# Patient Record
Sex: FEMALE | Race: WHITE | Employment: FULL TIME | URBAN - METROPOLITAN AREA
[De-identification: names, ages, dates, MRNs, and addresses within clinical notes are randomized per-mention and may not be internally consistent; named-entity substitution may affect disease eponyms.]

---

## 2017-07-27 LAB
HPV COMMENT: NORMAL
HPV TYPE 16: NOT DETECTED
HPV TYPE 18: NOT DETECTED
HPVOH (OTHER TYPES): NOT DETECTED

## 2022-05-03 ENCOUNTER — HOSPITAL ENCOUNTER (OUTPATIENT)
Dept: MAMMOGRAPHY | Age: 52
Discharge: HOME | End: 2022-05-07
Payer: COMMERCIAL

## 2022-05-03 VITALS — BODY MASS INDEX: 37.99 KG/M2 | HEIGHT: 65 IN | WEIGHT: 228 LBS

## 2022-05-03 DIAGNOSIS — Z12.31 VISIT FOR SCREENING MAMMOGRAM: ICD-10-CM

## 2022-05-03 PROCEDURE — 77063 BREAST TOMOSYNTHESIS BI: CPT

## 2023-07-06 ENCOUNTER — TELEPHONE (OUTPATIENT)
Dept: ORTHOPEDIC SURGERY | Age: 53
End: 2023-07-06

## 2023-07-06 NOTE — TELEPHONE ENCOUNTER
Faxed referral received for nondisplaced toe fracture. Left a message asking for a callback if interested in scheduling next week with Jair Monreal or Dr Sangeetha Stoddard.     Can offer 3:45 PM appt on 7/11 in [place]

## 2024-03-14 ENCOUNTER — TELEPHONE (OUTPATIENT)
Dept: PULMONOLOGY | Age: 54
End: 2024-03-14

## 2024-03-18 ENCOUNTER — OFFICE VISIT (OUTPATIENT)
Dept: PULMONOLOGY | Age: 54
End: 2024-03-18
Payer: COMMERCIAL

## 2024-03-18 VITALS
SYSTOLIC BLOOD PRESSURE: 138 MMHG | BODY MASS INDEX: 37.49 KG/M2 | OXYGEN SATURATION: 99 % | HEART RATE: 95 BPM | WEIGHT: 225 LBS | HEIGHT: 65 IN | DIASTOLIC BLOOD PRESSURE: 90 MMHG

## 2024-03-18 DIAGNOSIS — F17.200 CURRENT EVERY DAY SMOKER: ICD-10-CM

## 2024-03-18 DIAGNOSIS — R91.1 NODULE OF LOWER LOBE OF RIGHT LUNG: Primary | ICD-10-CM

## 2024-03-18 DIAGNOSIS — R06.09 DOE (DYSPNEA ON EXERTION): ICD-10-CM

## 2024-03-18 PROCEDURE — 99407 BEHAV CHNG SMOKING > 10 MIN: CPT | Performed by: INTERNAL MEDICINE

## 2024-03-18 PROCEDURE — 99204 OFFICE O/P NEW MOD 45 MIN: CPT | Performed by: INTERNAL MEDICINE

## 2024-03-18 RX ORDER — CETIRIZINE HYDROCHLORIDE 10 MG/1
10 TABLET ORAL DAILY
COMMUNITY
Start: 2024-02-12 | End: 2025-02-11

## 2024-03-18 RX ORDER — FLUTICASONE PROPIONATE 50 MCG
2 SPRAY, SUSPENSION (ML) NASAL DAILY
COMMUNITY
Start: 2022-11-14

## 2024-03-18 RX ORDER — VARENICLINE TARTRATE 1 MG/1
1 TABLET, FILM COATED ORAL 2 TIMES DAILY
Qty: 60 TABLET | Refills: 5 | Status: SHIPPED | OUTPATIENT
Start: 2024-03-18

## 2024-03-18 NOTE — PROGRESS NOTES
PULMONARY OFFICE NEW PATIENT VISIT    CONSULTING PHYSICIAN:      REASON FOR VISIT:   Chief Complaint   Patient presents with    LUNG NODULE     Results    Shortness of Breath    Wheezing       DATE OF VISIT: 3/18/2024    HISTORY OF PRESENT ILLNESS: 53 y.o. year old female smoker who is here for evaluation of lung nodule.    Patient stated that she had CT abdomen performed in December 2023 that showed right lower lobe 1.2 x 1 cm lung nodule.  This was followed by on CT chest in 3 months on 3/11/2024 that showed the right lower lobe nodule has slightly increased in size now measuring 1.5 x 1.2 cm.  I personally reviewed and interpreted the images.    Patient does complain of dyspnea on exertion when she walks long distances.  She denies any chronic cough or wheezing or chest pain or hemoptysis.    She has extensive smoking history of 28 pack years and continues to smoke 1 pack/day.  Patient stated that she tried to use Chantix 1 time to help quit smoking.  She stated quit for 1-1/2 months but then started smoking again.    No family history of lung cancer.  Her mother had breast cancer.  Patient works as a .      REVIEW OF SYSTEMS:   CONSTITUTIONAL SYMPTOMS: The patient denies fever, fatigue, night sweats, weight loss or weight gain.   HEENT: No vision changes. No tinnitus, Denies sinus pain. No hoarseness, or dysphagia.   NECK: Patient denies swelling in the neck.   CARDIOVASCULAR: Denies chest pain, palpitation, syncope.  RESPIRATORY: See above.   GASTROINTESTINAL: Denies nausea, abdominal pain or change in bowel function.  GENITOURINARY: Denies obstructive symptoms. No history of incontinence.  BREASTS: No masses or lumps in the breasts.   SKIN: No rashes or itching.   MUSCULOSKELETAL: Denies weakness or bone pain.   NEUROLOGICAL: No headaches or seizures.   PSYCHIATRIC: Denies mood swings or depression.   ENDOCRINE: Denies heat or cold intolerance or excessive thirst.  HEMATOLOGIC/LYMPHATIC: Denies

## 2024-03-21 ENCOUNTER — HOSPITAL ENCOUNTER (OUTPATIENT)
Dept: PULMONOLOGY | Age: 54
Discharge: HOME | End: 2024-03-21
Attending: INTERNAL MEDICINE
Payer: COMMERCIAL

## 2024-03-21 VITALS — HEART RATE: 93 BPM | RESPIRATION RATE: 16 BRPM | OXYGEN SATURATION: 97 %

## 2024-03-21 DIAGNOSIS — R06.09 DOE (DYSPNEA ON EXERTION): ICD-10-CM

## 2024-03-21 LAB
DLCO %PRED: 55 %
DLCO PRED: NORMAL
DLCO/VA %PRED: NORMAL
DLCO/VA PRED: NORMAL
DLCO/VA: NORMAL
DLCO: NORMAL
EXPIRATORY TIME-POST: NORMAL
EXPIRATORY TIME: NORMAL
FEF 25-75 %CHNG: NORMAL
FEF 25-75 POST %PRED: NORMAL
FEF 25-75% %PRED-PRE: NORMAL
FEF 25-75% PRED: NORMAL
FEF 25-75-POST: NORMAL
FEF 25-75-PRE: NORMAL
FEV1 %PRED-POST: 81 %
FEV1 %PRED-PRE: 74 %
FEV1 PRED: NORMAL
FEV1-POST: NORMAL
FEV1-PRE: NORMAL
FEV1/FVC %PRED-POST: NORMAL
FEV1/FVC %PRED-PRE: NORMAL
FEV1/FVC PRED: NORMAL
FEV1/FVC-POST: 107 %
FEV1/FVC-PRE: 108 %
FVC %PRED-POST: NORMAL
FVC %PRED-PRE: NORMAL
FVC PRED: NORMAL
FVC-POST: NORMAL
FVC-PRE: NORMAL
GAW %PRED: NORMAL
GAW PRED: NORMAL
GAW: NORMAL
IC PRE %PRED: NORMAL
IC PRED: NORMAL
IC: NORMAL
MEP: NORMAL
MIP: NORMAL
MVV %PRED-PRE: NORMAL
MVV PRED: NORMAL
MVV-PRE: NORMAL
PEF %PRED-POST: NORMAL
PEF %PRED-PRE: NORMAL
PEF PRED: NORMAL
PEF%CHNG: NORMAL
PEF-POST: NORMAL
PEF-PRE: NORMAL
RAW %PRED: NORMAL
RAW PRED: NORMAL
RAW: NORMAL
RV PRE %PRED: NORMAL
RV PRED: NORMAL
RV: NORMAL
SVC %PRED: NORMAL
SVC PRED: NORMAL
SVC: NORMAL
TLC PRE %PRED: 86 %
TLC PRED: NORMAL
TLC: NORMAL
VA %PRED: NORMAL
VA PRED: NORMAL
VA: NORMAL
VTG %PRED: NORMAL
VTG PRED: NORMAL
VTG: NORMAL

## 2024-03-21 PROCEDURE — 94060 EVALUATION OF WHEEZING: CPT

## 2024-03-21 PROCEDURE — 94760 N-INVAS EAR/PLS OXIMETRY 1: CPT

## 2024-03-21 PROCEDURE — 94729 DIFFUSING CAPACITY: CPT

## 2024-03-21 PROCEDURE — 94726 PLETHYSMOGRAPHY LUNG VOLUMES: CPT

## 2024-03-21 PROCEDURE — 6370000000 HC RX 637 (ALT 250 FOR IP): Performed by: INTERNAL MEDICINE

## 2024-03-21 RX ORDER — ALBUTEROL SULFATE 90 UG/1
4 AEROSOL, METERED RESPIRATORY (INHALATION) ONCE
Status: COMPLETED | OUTPATIENT
Start: 2024-03-21 | End: 2024-03-21

## 2024-03-21 RX ADMIN — Medication 4 PUFF: at 08:17

## 2024-03-21 ASSESSMENT — PULMONARY FUNCTION TESTS
FEV1_PERCENT_PREDICTED_PRE: 74
FEV1_PERCENT_PREDICTED_POST: 81
FEV1/FVC_PRE: 108
FEV1/FVC_POST: 107

## 2024-03-22 NOTE — PROCEDURES
Pulmonary Function Testing      Patient name:  Cici Hernández      Unit #:   [id number]   Date of test:  3/21/2024   Date of interpretation:   3/22/2024    Ms. Cici Hernández is a 53 y.o. year-old current smoker. The spirometry data were acceptable and reproducible.     Spirometry:  Flow volume loops were normal. The FEV-1/FVC ratio was normal. The pre-bronchodilator FEV-1 was 2.05 liters (74% of predicted), which was moderately decreased. The FVC was 2.4 liters (68% of predicted), which was decreased. Response to inhaled bronchodilators (albuterol) was not significant.    Lung volumes:  Lung volumes were tested by plethysmography. The total lung capacity was 4.4 liters (86% of predicted), which was normal. The residual volume was 1.88 liters (100% of predicted), which was normal. The ratio of residual volume to total lung capacity (RV/TLC) was 43, which was normal.     Diffusion capacity was found to be 55% predicted which is Moderately decreased.      Interpretation:  Isolated decrease in diffusion capacity noted, without restriction. Clinical correlation is recommended.    Comments:   0

## 2024-03-25 ENCOUNTER — HOSPITAL ENCOUNTER (OUTPATIENT)
Dept: PET IMAGING | Age: 54
Discharge: HOME | End: 2024-03-25
Payer: COMMERCIAL

## 2024-03-25 ENCOUNTER — TELEPHONE (OUTPATIENT)
Dept: PULMONOLOGY | Age: 54
End: 2024-03-25

## 2024-03-25 DIAGNOSIS — R91.1 NODULE OF LOWER LOBE OF RIGHT LUNG: ICD-10-CM

## 2024-03-25 PROCEDURE — 3430000000 HC RX DIAGNOSTIC RADIOPHARMACEUTICAL: Performed by: INTERNAL MEDICINE

## 2024-03-25 PROCEDURE — 78815 PET IMAGE W/CT SKULL-THIGH: CPT

## 2024-03-25 PROCEDURE — A9609 HC RX DIAGNOSTIC RADIOPHARMACEUTICAL: HCPCS | Performed by: INTERNAL MEDICINE

## 2024-03-25 RX ORDER — FLUDEOXYGLUCOSE F 18 200 MCI/ML
12.03 INJECTION, SOLUTION INTRAVENOUS
Status: COMPLETED | OUTPATIENT
Start: 2024-03-25 | End: 2024-03-25

## 2024-03-25 RX ADMIN — FLUDEOXYGLUCOSE F 18 12.03 MILLICURIE: 200 INJECTION, SOLUTION INTRAVENOUS at 08:57

## 2024-03-26 NOTE — TELEPHONE ENCOUNTER
Patient called and would like  to call patient tmr to go over results a  little more. Informed patient  left for the day and will be back in office tmr 3/27/24

## 2024-04-05 ENCOUNTER — TELEPHONE (OUTPATIENT)
Dept: PULMONOLOGY | Age: 54
End: 2024-04-05

## 2024-04-05 DIAGNOSIS — R91.1 NODULE OF LOWER LOBE OF RIGHT LUNG: Primary | ICD-10-CM

## 2024-04-05 NOTE — TELEPHONE ENCOUNTER
Called and spoke to patient and she would like to speak to you about results before BX is scheduled.

## 2024-04-08 ENCOUNTER — TELEPHONE (OUTPATIENT)
Dept: PULMONOLOGY | Age: 54
End: 2024-04-08

## 2024-04-08 NOTE — TELEPHONE ENCOUNTER
Please let Dr. Gerardo know that this case was discussed with Dr. Magno Garcia before placing the order.  I have discussed with the patient in details and patient really wants a biopsy to know the etiology of this nodule.

## 2024-04-08 NOTE — TELEPHONE ENCOUNTER
Delma with [facility] called to say that Dr Gerardo is recommending that the pt wait and have a repeat CT in 3 months rather than the Bx right now. Stated you can call Dr Gerardo Radiologist to discuss this at [phone].

## 2024-04-09 ENCOUNTER — TELEPHONE (OUTPATIENT)
Dept: INTERVENTIONAL RADIOLOGY/VASCULAR | Age: 54
End: 2024-04-09

## 2024-04-16 ENCOUNTER — HOSPITAL ENCOUNTER (OUTPATIENT)
Dept: CT IMAGING | Age: 54
Discharge: HOME | End: 2024-04-16
Payer: COMMERCIAL

## 2024-04-16 ENCOUNTER — HOSPITAL ENCOUNTER (OUTPATIENT)
Dept: GENERAL RADIOLOGY | Age: 54
Discharge: HOME | End: 2024-04-16
Payer: COMMERCIAL

## 2024-04-16 VITALS
OXYGEN SATURATION: 98 % | HEART RATE: 58 BPM | WEIGHT: 232 LBS | BODY MASS INDEX: 38.65 KG/M2 | RESPIRATION RATE: 16 BRPM | TEMPERATURE: 97.3 F | HEIGHT: 65 IN | DIASTOLIC BLOOD PRESSURE: 87 MMHG | SYSTOLIC BLOOD PRESSURE: 148 MMHG

## 2024-04-16 DIAGNOSIS — R91.1 NODULE OF LOWER LOBE OF RIGHT LUNG: ICD-10-CM

## 2024-04-16 LAB
ANION GAP SERPL CALCULATED.3IONS-SCNC: 11 MMOL/L (ref 3–16)
APTT BLD: 27.8 SEC (ref 22.1–36.4)
BASOPHILS # BLD: 0.1 K/UL (ref 0–0.2)
BASOPHILS NFR BLD: 1 %
BUN SERPL-MCNC: 13 MG/DL (ref 7–20)
CALCIUM SERPL-MCNC: 9.5 MG/DL (ref 8.3–10.6)
CHLORIDE SERPL-SCNC: 102 MMOL/L (ref 99–110)
CO2 SERPL-SCNC: 25 MMOL/L (ref 21–32)
CREAT SERPL-MCNC: 0.8 MG/DL (ref 0.6–1.1)
DEPRECATED RDW RBC AUTO: 13.9 % (ref 12.4–15.4)
EOSINOPHIL # BLD: 0.4 K/UL (ref 0–0.6)
EOSINOPHIL NFR BLD: 5.6 %
GFR SERPLBLD CREATININE-BSD FMLA CKD-EPI: 87 ML/MIN/{1.73_M2}
GLUCOSE SERPL-MCNC: 97 MG/DL (ref 70–99)
HCT VFR BLD AUTO: 43.4 % (ref 36–48)
HGB BLD-MCNC: 14.6 G/DL (ref 12–16)
INR PPP: 1.08 (ref 0.85–1.15)
LYMPHOCYTES # BLD: 1.4 K/UL (ref 1–5.1)
LYMPHOCYTES NFR BLD: 21 %
MCH RBC QN AUTO: 29.5 PG (ref 26–34)
MCHC RBC AUTO-ENTMCNC: 33.6 G/DL (ref 31–36)
MCV RBC AUTO: 88 FL (ref 80–100)
MONOCYTES # BLD: 0.7 K/UL (ref 0–1.3)
MONOCYTES NFR BLD: 10 %
NEUTROPHILS # BLD: 4.2 K/UL (ref 1.7–7.7)
NEUTROPHILS NFR BLD: 62.4 %
PLATELET # BLD AUTO: 280 K/UL (ref 135–450)
PMV BLD AUTO: 7.9 FL (ref 5–10.5)
POTASSIUM SERPL-SCNC: 4.4 MMOL/L (ref 3.5–5.1)
PROTHROMBIN TIME: 14.2 SEC (ref 11.9–14.9)
RBC # BLD AUTO: 4.94 M/UL (ref 4–5.2)
SODIUM SERPL-SCNC: 138 MMOL/L (ref 136–145)
WBC # BLD AUTO: 6.7 K/UL (ref 4–11)

## 2024-04-16 PROCEDURE — 7100000011 HC PHASE II RECOVERY - ADDTL 15 MIN: Performed by: RADIOLOGY

## 2024-04-16 PROCEDURE — 32408 CORE NDL BX LNG/MED PERQ: CPT

## 2024-04-16 PROCEDURE — 88341 IMHCHEM/IMCYTCHM EA ADD ANTB: CPT

## 2024-04-16 PROCEDURE — 88305 TISSUE EXAM BY PATHOLOGIST: CPT

## 2024-04-16 PROCEDURE — 6360000002 HC RX W HCPCS: Performed by: RADIOLOGY

## 2024-04-16 PROCEDURE — 80048 BASIC METABOLIC PNL TOTAL CA: CPT

## 2024-04-16 PROCEDURE — 7100000010 HC PHASE II RECOVERY - FIRST 15 MIN: Performed by: RADIOLOGY

## 2024-04-16 PROCEDURE — 85610 PROTHROMBIN TIME: CPT

## 2024-04-16 PROCEDURE — 88342 IMHCHEM/IMCYTCHM 1ST ANTB: CPT

## 2024-04-16 PROCEDURE — 85730 THROMBOPLASTIN TIME PARTIAL: CPT

## 2024-04-16 PROCEDURE — 85025 COMPLETE CBC W/AUTO DIFF WBC: CPT

## 2024-04-16 PROCEDURE — 71045 X-RAY EXAM CHEST 1 VIEW: CPT

## 2024-04-16 PROCEDURE — 88333 PATH CONSLTJ SURG CYTO XM 1: CPT

## 2024-04-16 PROCEDURE — 36415 COLL VENOUS BLD VENIPUNCTURE: CPT

## 2024-04-16 RX ORDER — MIDAZOLAM HYDROCHLORIDE 2 MG/2ML
INJECTION, SOLUTION INTRAMUSCULAR; INTRAVENOUS PRN
Status: COMPLETED | OUTPATIENT
Start: 2024-04-16 | End: 2024-04-16

## 2024-04-16 RX ORDER — FENTANYL CITRATE 50 UG/ML
INJECTION, SOLUTION INTRAMUSCULAR; INTRAVENOUS PRN
Status: COMPLETED | OUTPATIENT
Start: 2024-04-16 | End: 2024-04-16

## 2024-04-16 RX ADMIN — MIDAZOLAM HYDROCHLORIDE 1 MG: 1 INJECTION, SOLUTION INTRAMUSCULAR; INTRAVENOUS at 10:53

## 2024-04-16 RX ADMIN — FENTANYL CITRATE 25 MCG: 50 INJECTION, SOLUTION INTRAMUSCULAR; INTRAVENOUS at 11:00

## 2024-04-16 ASSESSMENT — PAIN - FUNCTIONAL ASSESSMENT: PAIN_FUNCTIONAL_ASSESSMENT: 0-10

## 2024-04-16 NOTE — DISCHARGE INSTRUCTIONS
Lung Biopsy Discharge Instructions       Follow up with your prescribing with any questions, concerns, results, and an appointment after your procedure in the time period recommended.       Rest quietly for 24 hours, no physical activity.  Avoid straining, lifting or strenuous physical activity for 2 weeks.  No airplane flights for 2 weeks.  Avoid coughing for 24 hours.  Call your physician for any questions regarding your activity.  Return to the emergency room for any shortness of breath. Be sure to tell the staff you had a lung biopsy.    Resume your regular diet.    -------------------------------------------------------------------------------------------------------------    Discharge Instructions for Lung Biopsy  You had a procedure called lung biopsy. Your doctor used a special needle to collect a small amount of tissue or fluid from your lung to examine it for signs of damage or disease. Lung biopsies are performed when lung disease is suspected, to rule out cancer or determine what a mass might be. Here's what to do following the procedure.  Home Care  Don’t drive for 24 to 48 hours after the procedure.  Remove the bandage covering the biopsy site 24 to 48 hours after the procedure.  Don’t shower for 24 hours after the biopsy. If you wish, you may wash yourself with a sponge or washcloth. When you are able to shower, don’t scrub the site. Gently wash the area and pat it dry.  Avoid coughing for 24 hours.  Don’t lift anything heavier than 10 pounds for 3 to 4 days after the procedure.  Ask your doctor when you can return to work. Most patients are able to go back to work within 24 to 48 hours of the procedure.  Do not smoke, and try to avoid anyone who is smoking.  Follow-Up  Make a follow-up appointment as directed by our staff with the physician who ordered the procedure  When to Call Your Doctor  Call your doctor right away if you have any of the following:  Exhaustion or extreme weakness  Coughing up

## 2024-04-16 NOTE — PRE SEDATION
Sedation Pre-Procedure Note    Patient Name: Cici Hernández   YOB: 1970  Room/Bed: Room/bed info not found  Medical Record Number: [id number]  Date: 4/16/2024   Time: 11:23 AM       Indication:  Lung nodule biopsy.    Consent: I have discussed with the patient and/or the patient representative the indication, alternatives, and the possible risks and/or complications of the planned procedure and the anesthesia methods. The patient and/or patient representative appear to understand and agree to proceed.    Vital Signs:   Vitals:    04/16/24 1120   BP: 125/83   Pulse: 60   Resp: 18   Temp:    SpO2: 100%       Past Medical History:   has a past medical history of Smoker.    Past Surgical History:   has no past surgical history on file.    Medications:   Scheduled Meds:   Continuous Infusions:   PRN Meds:   Home Meds:   Prior to Admission medications    Medication Sig Start Date End Date Taking? Authorizing Provider   cetirizine (ZYRTEC) 10 MG tablet Take 1 tablet by mouth daily 2/12/24 2/11/25  Yaw Rolle MD   fluticasone (FLONASE) 50 MCG/ACT nasal spray 2 sprays by Nasal route daily 11/14/22   Yaw Rolle MD   varenicline (CHANTIX) 1 MG tablet Take 1 tablet by mouth 2 times daily 3/18/24   Harika Salazar MD     Coumadin Use Last 7 Days:  no  Antiplatelet drug therapy use last 7 days: no  Other anticoagulant use last 7 days: no  Additional Medication Information:  n/a      Pre-Sedation Documentation and Exam:   I have reviewed the patient's history and review of systems.    Mallampati Airway Assessment:  Mallampati Class II - (soft palate, fauces & uvula are visible)    Prior History of Anesthesia Complications:   none    ASA Classification:  Class 2 - A normal healthy patient with mild systemic disease    Sedation/ Anesthesia Plan:   intravenous sedation    Medications Planned:   midazolam (Versed) intravenously and fentanyl intravenously    Patient is an appropriate candidate for

## 2024-04-16 NOTE — PROGRESS NOTES
Discharge instructions reviewed and understanding verbalized per pt/family with copy given. All home medications/new prescriptions have been reviewed, questions answered and patient/family state understanding. Medication information sheet provided for new prescriptions received when applicable. Pt peripheral IV removed, intact, bleeding controlled. Pt safely transported off unit with all belongings.

## 2024-04-16 NOTE — PROGRESS NOTES
Pt received from PACU. Pt dressing to R chest clean, dry and intact. Pt family to bedside, updated on plan of care.

## 2024-04-16 NOTE — BRIEF OP NOTE
Brief Postoperative Note    Cici Hernández  YOB: 1970  [id number]    Pre-operative Diagnosis: RLL lung nodule    Post-operative Diagnosis: Same    Procedure: CT-guided biopsy of left lower lobe lung nodule    Anesthesia: Moderate Sedation    Surgeons: Ishmael Gerardo MD    Estimated Blood Loss: Less than 5 mL    Complications: None    Specimens: Was Obtained: 2 fragmented cores and 2 solid cores    Findings: Successful CT-guided right lower lobe lung nodule biopsy.    Electronically signed by Ishmael Gerardo MD on 4/16/2024 at 11:24 AM

## 2024-04-17 ENCOUNTER — TELEPHONE (OUTPATIENT)
Dept: INTERVENTIONAL RADIOLOGY/VASCULAR | Age: 54
End: 2024-04-17

## 2024-04-18 ENCOUNTER — OFFICE VISIT (OUTPATIENT)
Dept: PULMONOLOGY | Age: 54
End: 2024-04-18
Payer: COMMERCIAL

## 2024-04-18 VITALS
HEIGHT: 65 IN | DIASTOLIC BLOOD PRESSURE: 82 MMHG | HEART RATE: 82 BPM | OXYGEN SATURATION: 97 % | WEIGHT: 237.6 LBS | BODY MASS INDEX: 39.58 KG/M2 | SYSTOLIC BLOOD PRESSURE: 128 MMHG

## 2024-04-18 DIAGNOSIS — F17.200 CURRENT EVERY DAY SMOKER: ICD-10-CM

## 2024-04-18 DIAGNOSIS — C34.31 MALIGNANT NEOPLASM OF LOWER LOBE OF RIGHT LUNG (HCC): Primary | ICD-10-CM

## 2024-04-18 DIAGNOSIS — J43.2 CENTRILOBULAR EMPHYSEMA (HCC): ICD-10-CM

## 2024-04-18 PROCEDURE — 99214 OFFICE O/P EST MOD 30 MIN: CPT | Performed by: INTERNAL MEDICINE

## 2024-04-18 NOTE — PROGRESS NOTES
PULMONARY OFFICE FOLLOW UP NOTE    REASON FOR VISIT:   Chief Complaint   Patient presents with    Follow up after BX       DATE OF VISIT: 4/18/2024    HISTORY OF PRESENT ILLNESS: 53 y.o. year old female is here for follow-up of lung nodule.    Patient had CT abdomen performed in December 2023 that showed right lower lobe 1.2 x 1 cm lung nodule.  This was followed by on CT chest in 3 months on 3/11/2024 that showed the right lower lobe nodule has slightly increased in size now measuring 1.5 x 1.2 cm.   Patient had PET/CT performed on 3/25/2024 that showed that the nodule was mildly PET positive.  No PET positive mediastinal lymphadenopathy was noted. I personally reviewed and interpreted the images.    She underwent CT-guided biopsy on 4/16/2024 of right lower lobe lung nodule which was positive for neoplasm.  Very scant atypical cells with crush artifact were noted on the biopsies.  Immunostains were attempted, however, difficult to interpret.  Given the morphology, neuroendocrine neoplasm is in consideration.     Patient does complain of dyspnea on exertion when she walks long distances.  She denies any chronic cough or wheezing or chest pain or hemoptysis.     She has extensive smoking history of 28 pack years and continues to smoke 1 pack/day.  She plans to use Chantix to help quit smoking.     No family history of lung cancer.  Her mother had breast cancer.  Patient works as a .    PFT from 3/21/2024 showed reduced diffusion capacity.  FEV1/FVC normal  FEV1 74%, 2.05 L  FVC 68%, 2.4 L  TLC 86%, 4.4 L  DLCO 55%      REVIEW OF SYSTEMS:   CONSTITUTIONAL SYMPTOMS: The patient denies fever, fatigue, night sweats, weight loss or weight gain.   HEENT: No vision changes. No tinnitus, Denies sinus pain. No hoarseness, or dysphagia.   NECK: Patient denies swelling in the neck.   CARDIOVASCULAR: Denies chest pain, palpitation, syncope.  RESPIRATORY: See above  GASTROINTESTINAL: Denies nausea, abdominal pain

## 2024-04-25 ENCOUNTER — TELEPHONE (OUTPATIENT)
Age: 54
End: 2024-04-25

## 2024-04-25 ENCOUNTER — TELEPHONE (OUTPATIENT)
Dept: CARDIOTHORACIC SURGERY | Age: 54
End: 2024-04-25

## 2024-04-25 NOTE — TELEPHONE ENCOUNTER
Patient returned Anna's call regarding rescheduling today's appointment. She will be awaiting her call and has asked that we remove her work number, which I did and replace it with her husbands mobile number to call if she does not answer.

## 2024-04-25 NOTE — TELEPHONE ENCOUNTER
LMOR for pt cancelling appt today with Dr. Milan due to emergency surgery. Also called pt work and provided information to phone agent requesting they reach out to pt who is a  and not on site. Will reach out to resched when plan approved.

## 2024-04-29 ENCOUNTER — HOSPITAL ENCOUNTER (OUTPATIENT)
Dept: MRI IMAGING | Age: 54
Discharge: HOME | End: 2024-04-29
Attending: INTERNAL MEDICINE
Payer: COMMERCIAL

## 2024-04-29 ENCOUNTER — OFFICE VISIT (OUTPATIENT)
Age: 54
End: 2024-04-29
Payer: COMMERCIAL

## 2024-04-29 VITALS
HEIGHT: 65 IN | HEART RATE: 84 BPM | DIASTOLIC BLOOD PRESSURE: 76 MMHG | SYSTOLIC BLOOD PRESSURE: 130 MMHG | WEIGHT: 240.3 LBS | BODY MASS INDEX: 40.04 KG/M2 | OXYGEN SATURATION: 96 % | TEMPERATURE: 98.4 F

## 2024-04-29 DIAGNOSIS — C34.31 MALIGNANT NEOPLASM OF LOWER LOBE OF RIGHT LUNG (HCC): ICD-10-CM

## 2024-04-29 DIAGNOSIS — Z01.818 PRE-OP TESTING: ICD-10-CM

## 2024-04-29 DIAGNOSIS — C34.90 MALIGNANT NEOPLASM OF LUNG, UNSPECIFIED LATERALITY, UNSPECIFIED PART OF LUNG (HCC): Primary | ICD-10-CM

## 2024-04-29 DIAGNOSIS — C34.91 NON-SMALL CELL LUNG CANCER, RIGHT (HCC): Primary | ICD-10-CM

## 2024-04-29 PROCEDURE — 6360000004 HC RX CONTRAST MEDICATION: Performed by: INTERNAL MEDICINE

## 2024-04-29 PROCEDURE — 99205 OFFICE O/P NEW HI 60 MIN: CPT | Performed by: THORACIC SURGERY (CARDIOTHORACIC VASCULAR SURGERY)

## 2024-04-29 PROCEDURE — A9577 INJ MULTIHANCE: HCPCS | Performed by: INTERNAL MEDICINE

## 2024-04-29 PROCEDURE — 2580000003 HC RX 258: Performed by: INTERNAL MEDICINE

## 2024-04-29 PROCEDURE — 70553 MRI BRAIN STEM W/O & W/DYE: CPT

## 2024-04-29 RX ORDER — SODIUM CHLORIDE 0.9 % (FLUSH) 0.9 %
10 SYRINGE (ML) INJECTION ONCE
Status: COMPLETED | OUTPATIENT
Start: 2024-04-29 | End: 2024-04-29

## 2024-04-29 RX ADMIN — GADOBENATE DIMEGLUMINE 18 ML: 529 INJECTION, SOLUTION INTRAVENOUS at 08:42

## 2024-04-29 RX ADMIN — Medication 10 ML: at 08:44

## 2024-04-29 NOTE — PROGRESS NOTES
Department of Cardiovascular & Thoracic Surgery  Consult Note        Reason for Consult: Right lower lobe lung nodule  Requesting Physician: Dr. Salazar    History Obtained From:  patient, mother    HISTORY OF PRESENT ILLNESS:              The patient is a 53 y.o. female active smoker who was found to have an incidental right lower lobe lung nodule when she underwent CT scan of the abdomen in December 2023 to evaluate peripheral edema.  Repeat CT scan in a 3-month intervals slight enlargement of the nodule from 1.2 x 1 cm to 1.5 x 1.2 cm.  PET scan was weakly positive and CT-guided needle biopsy shows abnormal tissue although unable to further differentiate because of crush artifact.  MRI was done today but I do not have the official report.  She denies any headache constitutional symptoms such as fatigue lack of energy loss of appetite unexplained weight loss or new lumps or nodules or aches or pains.  Pulmonary function tests are excellent with an FEV1 which is 74% of predicted or 2.05 FEV1 and a DLCO of 55% predicted.  Past Medical History:        Diagnosis Date    Active tobacco use 20-pack-year history quit 5 days ago     Hyperlipidemia     Prediabetes     Peripheral edema unknown etiology     Chronic low back pain     Vitamin D deficiency     Fatty liver     History of a renal cyst     History of hiatal hernia status post disrupted repair (Dr. Mary Jane SILVERIO)     Urinary incontinence    Negative stress test in 2009  Venous Dopplers in 2014  Past Surgical History:        Procedure Laterality Date    CT NEEDLE BIOPSY LUNG PERCUTANEOUS  4/16/2024    Cholecystectomy      Hiatal hernia repair      Upper endoscopy 2016      Right carpal tunnel      Surgery for broken nose     Current Medications:   As needed Zyrtec and Flonase  Doans Backaid-OTC but takes every day for LB P  Chantix  Allergies:  Naproxen    Social History:   Social History     Socioeconomic History    Marital status:    Occupational History

## 2024-05-03 ENCOUNTER — HOSPITAL ENCOUNTER (OUTPATIENT)
Dept: VASCULAR LAB | Age: 54
Discharge: HOME | End: 2024-05-03
Payer: COMMERCIAL

## 2024-05-03 DIAGNOSIS — R60.9 EDEMA, UNSPECIFIED TYPE: Primary | ICD-10-CM

## 2024-05-03 DIAGNOSIS — Z01.818 PRE-OP EXAM: Primary | ICD-10-CM

## 2024-05-03 DIAGNOSIS — Z01.818 PRE-OP TESTING: ICD-10-CM

## 2024-05-03 PROCEDURE — 93970 EXTREMITY STUDY: CPT

## 2024-05-03 PROCEDURE — 93880 EXTRACRANIAL BILAT STUDY: CPT

## 2024-05-06 ENCOUNTER — HOSPITAL ENCOUNTER (OUTPATIENT)
Age: 54
Discharge: HOME | End: 2024-05-08
Payer: COMMERCIAL

## 2024-05-06 VITALS
SYSTOLIC BLOOD PRESSURE: 130 MMHG | BODY MASS INDEX: 41.48 KG/M2 | WEIGHT: 243 LBS | DIASTOLIC BLOOD PRESSURE: 76 MMHG | HEIGHT: 64 IN

## 2024-05-06 DIAGNOSIS — C34.90 MALIGNANT NEOPLASM OF LUNG, UNSPECIFIED LATERALITY, UNSPECIFIED PART OF LUNG (HCC): ICD-10-CM

## 2024-05-06 LAB
ECHO AV MEAN GRADIENT: 4 MMHG
ECHO AV MEAN VELOCITY: 0.9 M/S
ECHO AV PEAK GRADIENT: 5 MMHG
ECHO AV PEAK VELOCITY: 1.2 M/S
ECHO AV VELOCITY RATIO: 0.83
ECHO AV VTI: 27 CM
ECHO BSA: 2.23 M2
ECHO LA AREA 2C: 20.7 CM2
ECHO LA AREA 4C: 17.5 CM2
ECHO LA MAJOR AXIS: 4.9 CM
ECHO LA MINOR AXIS: 5.3 CM
ECHO LA VOL BP: 60 ML (ref 22–52)
ECHO LA VOL MOD A2C: 65 ML (ref 22–52)
ECHO LA VOL MOD A4C: 52 ML (ref 22–52)
ECHO LA VOL/BSA BIPLANE: 28 ML/M2 (ref 16–34)
ECHO LA VOLUME INDEX MOD A2C: 31 ML/M2 (ref 16–34)
ECHO LA VOLUME INDEX MOD A4C: 25 ML/M2 (ref 16–34)
ECHO LV E' LATERAL VELOCITY: 11 CM/S
ECHO LV E' SEPTAL VELOCITY: 7 CM/S
ECHO LV FRACTIONAL SHORTENING: 25 % (ref 28–44)
ECHO LV INTERNAL DIMENSION DIASTOLE INDEX: 2.26 CM/M2
ECHO LV INTERNAL DIMENSION DIASTOLIC: 4.8 CM (ref 3.9–5.3)
ECHO LV INTERNAL DIMENSION SYSTOLIC INDEX: 1.7 CM/M2
ECHO LV INTERNAL DIMENSION SYSTOLIC: 3.6 CM
ECHO LV IVSD: 0.9 CM (ref 0.6–0.9)
ECHO LV MASS 2D: 137.1 G (ref 67–162)
ECHO LV MASS INDEX 2D: 64.7 G/M2 (ref 43–95)
ECHO LV POSTERIOR WALL DIASTOLIC: 0.8 CM (ref 0.6–0.9)
ECHO LV RELATIVE WALL THICKNESS RATIO: 0.33
ECHO LVOT AV VTI INDEX: 0.72
ECHO LVOT MEAN GRADIENT: 2 MMHG
ECHO LVOT PEAK GRADIENT: 4 MMHG
ECHO LVOT PEAK VELOCITY: 1 M/S
ECHO LVOT VTI: 19.5 CM
ECHO MV A VELOCITY: 0.72 M/S
ECHO MV E VELOCITY: 0.63 M/S
ECHO MV E/A RATIO: 0.88
ECHO MV E/E' LATERAL: 5.73
ECHO MV E/E' RATIO (AVERAGED): 7.36
ECHO MV LVOT VTI INDEX: 1.31
ECHO MV MAX VELOCITY: 0.9 M/S
ECHO MV MEAN GRADIENT: 2 MMHG
ECHO MV MEAN VELOCITY: 0.6 M/S
ECHO MV PEAK GRADIENT: 3 MMHG
ECHO MV VTI: 25.6 CM
ECHO PV MAX VELOCITY: 1 M/S
ECHO PV MEAN GRADIENT: 2 MMHG
ECHO PV MEAN VELOCITY: 0.7 M/S
ECHO PV PEAK GRADIENT: 4 MMHG
ECHO PV VTI: 15.7 CM
ECHO RV FREE WALL PEAK S': 13 CM/S
ECHO RV INTERNAL DIMENSION: 3.4 CM
ECHO RV TAPSE: 1.8 CM (ref 1.7–?)
ECHO TV REGURGITANT MAX VELOCITY: 2.15 M/S
ECHO TV REGURGITANT PEAK GRADIENT: 18 MMHG

## 2024-05-06 PROCEDURE — 93306 TTE W/DOPPLER COMPLETE: CPT

## 2024-05-06 PROCEDURE — 93306 TTE W/DOPPLER COMPLETE: CPT | Performed by: INTERNAL MEDICINE

## 2024-05-06 PROCEDURE — 93356 MYOCRD STRAIN IMG SPCKL TRCK: CPT | Performed by: INTERNAL MEDICINE

## 2024-05-06 PROCEDURE — 93356 MYOCRD STRAIN IMG SPCKL TRCK: CPT

## 2024-05-06 NOTE — DISCHARGE INSTR - COC
Continuity of Care Form    Patient Name: Cici Hernández   :  1970  MRN:  [id number]    Admit date:  2024  Discharge date:  ***    Code Status Order: Prior   Advance Directives:     Admitting Physician:  No admitting provider for patient encounter.  PCP: Sallie Becker MD    Discharging Nurse: ***  Discharging Hospital Unit/Room#: No information available for this encounter.  Discharging Unit Phone Number: ***    Emergency Contact:   Extended Emergency Contact Information  Primary Emergency Contact: Genaro Hernández  Address: [address]  Home Phone: [phone]  Mobile Phone: [phone]  Relation: Spouse  Secondary Emergency Contact: Conchita Hernández  Address: [facility] [address]  Home Phone: [phone]  Mobile Phone: [phone]  Relation: Child    Past Surgical History:  Past Surgical History:   Procedure Laterality Date    CT NEEDLE BIOPSY LUNG PERCUTANEOUS  2024    CT NEEDLE BIOPSY LUNG PERCUTANEOUS 2024 [facility] CT SCAN       Immunization History:     There is no immunization history on file for this patient.    Active Problems:  There is no problem list on file for this patient.      Isolation/Infection:   Isolation            No Isolation          Patient Infection Status       None to display            Nurse Assessment:  Last Vital Signs: /76   Ht 1.626 m (5' 4\")   Wt 110.2 kg (243 lb)   BMI 41.71 kg/m²     Last documented pain score (0-10 scale):    Last Weight:   Wt Readings from Last 1 Encounters:   24 110.2 kg (243 lb)     Mental Status:  {IP PT MENTAL STATUS:}    IV Access:  { MARCY IV ACCESS:163443961}    Nursing Mobility/ADLs:  Walking   {CHP DME ADLs:551853052}  Transfer  {CHP DME ADLs:101491819}  Bathing  {CHP DME ADLs:694455149}  Dressing  {CHP DME ADLs:572209202}  Toileting  {CHP DME ADLs:864841243}  Feeding  {CHP DME ADLs:751953074}  Med Admin

## 2024-05-07 ENCOUNTER — TELEPHONE (OUTPATIENT)
Age: 54
End: 2024-05-07

## 2024-05-08 ENCOUNTER — PREP FOR PROCEDURE (OUTPATIENT)
Age: 54
End: 2024-05-08

## 2024-05-08 DIAGNOSIS — R91.1 LUNG NODULE: ICD-10-CM

## 2024-05-08 DIAGNOSIS — R91.8 LUNG MASS: Primary | ICD-10-CM

## 2024-05-08 RX ORDER — SODIUM CHLORIDE 9 MG/ML
INJECTION, SOLUTION INTRAVENOUS PRN
Status: CANCELLED | OUTPATIENT
Start: 2024-05-08

## 2024-05-08 RX ORDER — SODIUM CHLORIDE 0.9 % (FLUSH) 0.9 %
5-40 SYRINGE (ML) INJECTION EVERY 12 HOURS SCHEDULED
Status: CANCELLED | OUTPATIENT
Start: 2024-05-08

## 2024-05-08 RX ORDER — SODIUM CHLORIDE 0.9 % (FLUSH) 0.9 %
5-40 SYRINGE (ML) INJECTION PRN
Status: CANCELLED | OUTPATIENT
Start: 2024-05-08

## 2024-05-08 RX ORDER — SODIUM CHLORIDE 9 MG/ML
INJECTION, SOLUTION INTRAVENOUS CONTINUOUS
Status: CANCELLED | OUTPATIENT
Start: 2024-05-08

## 2024-05-09 ENCOUNTER — TELEPHONE (OUTPATIENT)
Dept: CARDIOTHORACIC SURGERY | Age: 54
End: 2024-05-09

## 2024-05-09 RX ORDER — ASCORBIC ACID 500 MG
500 TABLET ORAL 2 TIMES DAILY
Status: ON HOLD | COMMUNITY

## 2024-05-09 NOTE — TELEPHONE ENCOUNTER
Spoke with patient regarding surgery scheduled for 5/22/2024 at 7:30 am with arrival time of 5:30 am at [facility] with Dr. Sonya Milan to include: Robotic assisted right lower lobectomy with mediastinal lymph node dissection. Patient is scheduled for an arterial duplex study on 5/15/2024 at 10:00 am and will complete updated lab work following. Patient has been instructed not to eat or drink after midnight the day of surgery and to call our office with any questions or concerns.

## 2024-05-09 NOTE — PROGRESS NOTES
Name_______________________________________Printed:____________________  Date and time of surgery__5/22/24 @ 0730______________________Arrival Time:___0530__main hosp___________   1. The instructions given regarding when and if a patient needs to stop oral intake prior to surgery varies.Follow the specific instructions you were given                  __x_Nothing to eat or to drink after Midnight the night before.                   ____Carbo loading or instructions will be given to select patients-if you have been given those instructions -please do the following                           The evening before your surgery after dinner before midnight drink 40 ounces of gatorade.If you are diabetic use sugar free.  The morning of surgery drink 40 ounces of water.This needs to be finished 3 hours prior to your surgery start time.    2. Take the following pills with a small sip of water on the morning of surgery_____none______________________________________________                  Do not take blood pressure medications ending in pril or sartan the ryann prior to surgery or the morning of surgery. Dr Gasca's patient are not to take any medications the AM of surgery.         3. Aspirin, Ibuprofen, Advil, Naproxen, Vitamin E and other Anti-inflammatory products and supplements should be stopped for 5 -7days before surgery or as directed by your physician.   4. Check with your Doctor regarding stopping Plavix, Coumadin,Eliquis, Lovenox,Effient,Pradaxa,Xarelto, Fragmin or other blood thinners and follow their instructions.   5. Do not smoke, and do not drink any alcoholic beverages 24 hours prior to surgery.  This includes NA Beer.Refrain from the usage of any recreational drugs.   6. You may brush your teeth and gargle the morning of surgery.  DO NOT SWALLOW WATER   7. You MUST make arrangements for a responsible adult to stay on site while you are here and take you home after your surgery. You will not be allowed to leave

## 2024-05-15 ENCOUNTER — HOSPITAL ENCOUNTER (OUTPATIENT)
Dept: VASCULAR LAB | Age: 54
Discharge: HOME | End: 2024-05-17
Attending: THORACIC SURGERY (CARDIOTHORACIC VASCULAR SURGERY)
Payer: COMMERCIAL

## 2024-05-15 ENCOUNTER — HOSPITAL ENCOUNTER (OUTPATIENT)
Age: 54
Discharge: HOME | End: 2024-05-15
Attending: THORACIC SURGERY (CARDIOTHORACIC VASCULAR SURGERY)
Payer: COMMERCIAL

## 2024-05-15 ENCOUNTER — HOSPITAL ENCOUNTER (OUTPATIENT)
Dept: GENERAL RADIOLOGY | Age: 54
Discharge: HOME | End: 2024-05-15
Attending: THORACIC SURGERY (CARDIOTHORACIC VASCULAR SURGERY)
Payer: COMMERCIAL

## 2024-05-15 DIAGNOSIS — R60.9 EDEMA, UNSPECIFIED TYPE: ICD-10-CM

## 2024-05-15 DIAGNOSIS — Z01.818 PREOP TESTING: ICD-10-CM

## 2024-05-15 DIAGNOSIS — R91.8 LUNG MASS: ICD-10-CM

## 2024-05-15 LAB
ABO + RH BLD: NORMAL
ANION GAP SERPL CALCULATED.3IONS-SCNC: 10 MMOL/L (ref 3–16)
APTT BLD: 28.3 SEC (ref 22.1–36.4)
BASE EXCESS BLDA CALC-SCNC: -0.3 MMOL/L (ref -3–3)
BASOPHILS # BLD: 0.1 K/UL (ref 0–0.2)
BASOPHILS NFR BLD: 0.9 %
BLD GP AB SCN SERPL QL: NORMAL
BUN SERPL-MCNC: 14 MG/DL (ref 7–20)
CALCIUM SERPL-MCNC: 9.6 MG/DL (ref 8.3–10.6)
CHLORIDE SERPL-SCNC: 103 MMOL/L (ref 99–110)
CO2 BLDA-SCNC: 55.4 MMOL/L
CO2 SERPL-SCNC: 27 MMOL/L (ref 21–32)
COHGB MFR BLDA: 1.2 % (ref 0–1.5)
CREAT SERPL-MCNC: 0.8 MG/DL (ref 0.6–1.1)
DEPRECATED RDW RBC AUTO: 13.9 % (ref 12.4–15.4)
EKG ATRIAL RATE: 51 BPM
EKG DIAGNOSIS: NORMAL
EKG P AXIS: -10 DEGREES
EKG P-R INTERVAL: 124 MS
EKG Q-T INTERVAL: 454 MS
EKG QRS DURATION: 94 MS
EKG QTC CALCULATION (BAZETT): 418 MS
EKG R AXIS: -3 DEGREES
EKG T AXIS: 6 DEGREES
EKG VENTRICULAR RATE: 51 BPM
EOSINOPHIL # BLD: 0.3 K/UL (ref 0–0.6)
EOSINOPHIL NFR BLD: 4.7 %
FIBRINOGEN PPP-MCNC: 410 MG/DL (ref 227–534)
GFR SERPLBLD CREATININE-BSD FMLA CKD-EPI: 86 ML/MIN/{1.73_M2}
GLUCOSE SERPL-MCNC: 85 MG/DL (ref 70–99)
HCO3 BLDA-SCNC: 23.6 MMOL/L (ref 21–29)
HCT VFR BLD AUTO: 41.5 % (ref 36–48)
HGB BLD-MCNC: 14.3 G/DL (ref 12–16)
HGB BLDA-MCNC: 14.8 G/DL (ref 12–16)
INR PPP: 0.96 (ref 0.85–1.15)
LYMPHOCYTES # BLD: 1.6 K/UL (ref 1–5.1)
LYMPHOCYTES NFR BLD: 24.2 %
MCH RBC QN AUTO: 30.2 PG (ref 26–34)
MCHC RBC AUTO-ENTMCNC: 34.5 G/DL (ref 31–36)
MCV RBC AUTO: 87.6 FL (ref 80–100)
METHGB MFR BLDA: 0 %
MONOCYTES # BLD: 0.5 K/UL (ref 0–1.3)
MONOCYTES NFR BLD: 7.7 %
NEUTROPHILS # BLD: 4.2 K/UL (ref 1.7–7.7)
NEUTROPHILS NFR BLD: 62.5 %
O2 THERAPY: NORMAL
PCO2 BLDA: 35.8 MMHG (ref 35–45)
PH BLDA: 7.43 [PH] (ref 7.35–7.45)
PLATELET # BLD AUTO: 309 K/UL (ref 135–450)
PMV BLD AUTO: 8.6 FL (ref 5–10.5)
PO2 BLDA: 82.9 MMHG (ref 75–108)
POTASSIUM SERPL-SCNC: 4.2 MMOL/L (ref 3.5–5.1)
PROTHROMBIN TIME: 13 SEC (ref 11.9–14.9)
RBC # BLD AUTO: 4.74 M/UL (ref 4–5.2)
SAO2 % BLDA: 97 %
SODIUM SERPL-SCNC: 140 MMOL/L (ref 136–145)
VAS LEFT ATA DIST PSV: 54.6 CM/S
VAS LEFT ATA PROX PSV: 53.6 CM/S
VAS LEFT PERONEAL DIST PSV: 29.4 CM/S
VAS LEFT PERONEAL PROX PSV: 54.9 CM/S
VAS LEFT POP A DIST PSV: 50 CM/S
VAS LEFT POP A PROX PSV: 61.5 CM/S
VAS LEFT POP A PROX VEL RATIO: 0.56
VAS LEFT PTA DIST PSV: 68.5 CM/S
VAS LEFT PTA PROX PSV: 72.4 CM/S
VAS LEFT SFA DIST PSV: 110 CM/S
VAS LEFT SFA DIST VEL RATIO: 1.37
VAS LEFT SFA MID PSV: 80.1 CM/S
VAS LEFT SFA MID VEL RATIO: 1.11
VAS LEFT SFA PROX PSV: 72.4 CM/S
VAS RIGHT ATA DIST PSV: 54.3 CM/S
VAS RIGHT ATA PROX PSV: 44.5 CM/S
VAS RIGHT CFA PROX PSV: 84.8 CM/S
VAS RIGHT PERONEAL DIST PSV: 58.1 CM/S
VAS RIGHT PERONEAL PROX PSV: 57.1 CM/S
VAS RIGHT POP A DIST PSV: 79 CM/S
VAS RIGHT POP A PROX PSV: 41.9 CM/S
VAS RIGHT POP A PROX VEL RATIO: 0.48
VAS RIGHT PTA DIST PSV: 59.3 CM/S
VAS RIGHT PTA PROX PSV: 20.4 CM/S
VAS RIGHT SFA DIST PSV: 87.3 CM/S
VAS RIGHT SFA DIST VEL RATIO: 1.08
VAS RIGHT SFA MID PSV: 80.7 CM/S
VAS RIGHT SFA MID VEL RATIO: 1.1
VAS RIGHT SFA PROX PSV: 74.6 CM/S
VAS RIGHT SFA PROX VEL RATIO: 0.9
WBC # BLD AUTO: 6.8 K/UL (ref 4–11)

## 2024-05-15 PROCEDURE — 85610 PROTHROMBIN TIME: CPT

## 2024-05-15 PROCEDURE — 71046 X-RAY EXAM CHEST 2 VIEWS: CPT

## 2024-05-15 PROCEDURE — 85384 FIBRINOGEN ACTIVITY: CPT

## 2024-05-15 PROCEDURE — 82803 BLOOD GASES ANY COMBINATION: CPT

## 2024-05-15 PROCEDURE — 93925 LOWER EXTREMITY STUDY: CPT | Performed by: INTERNAL MEDICINE

## 2024-05-15 PROCEDURE — 80048 BASIC METABOLIC PNL TOTAL CA: CPT

## 2024-05-15 PROCEDURE — 85730 THROMBOPLASTIN TIME PARTIAL: CPT

## 2024-05-15 PROCEDURE — 36600 WITHDRAWAL OF ARTERIAL BLOOD: CPT

## 2024-05-15 PROCEDURE — 86901 BLOOD TYPING SEROLOGIC RH(D): CPT

## 2024-05-15 PROCEDURE — 85025 COMPLETE CBC W/AUTO DIFF WBC: CPT

## 2024-05-15 PROCEDURE — 93925 LOWER EXTREMITY STUDY: CPT

## 2024-05-15 PROCEDURE — 83036 HEMOGLOBIN GLYCOSYLATED A1C: CPT

## 2024-05-15 PROCEDURE — 36415 COLL VENOUS BLD VENIPUNCTURE: CPT

## 2024-05-15 PROCEDURE — 86900 BLOOD TYPING SEROLOGIC ABO: CPT

## 2024-05-15 PROCEDURE — 86850 RBC ANTIBODY SCREEN: CPT

## 2024-05-16 LAB
EST. AVERAGE GLUCOSE BLD GHB EST-MCNC: 111.2 MG/DL
HBA1C MFR BLD: 5.5 %

## 2024-05-20 NOTE — PROGRESS NOTES
Notified Edwige in blood bank that Dr. Milan ordered  RBC x 2 units and platelets x 1 product for pt's surgery on 5/22/24. The orders are under active-future in epic.

## 2024-05-21 ENCOUNTER — TELEPHONE (OUTPATIENT)
Dept: PULMONOLOGY | Age: 54
End: 2024-05-21

## 2024-05-21 NOTE — TELEPHONE ENCOUNTER
Patient called and needs her pathology report showing that she has cancer and would like those emailed to her     PH: [phone]

## 2024-05-22 ENCOUNTER — APPOINTMENT (OUTPATIENT)
Dept: GENERAL RADIOLOGY | Age: 54
End: 2024-05-22
Attending: THORACIC SURGERY (CARDIOTHORACIC VASCULAR SURGERY)
Payer: COMMERCIAL

## 2024-05-22 ENCOUNTER — HOSPITAL ENCOUNTER (INPATIENT)
Age: 54
LOS: 9 days | Discharge: HOME | End: 2024-05-31
Attending: THORACIC SURGERY (CARDIOTHORACIC VASCULAR SURGERY) | Admitting: THORACIC SURGERY (CARDIOTHORACIC VASCULAR SURGERY)
Payer: COMMERCIAL

## 2024-05-22 ENCOUNTER — ANESTHESIA (OUTPATIENT)
Dept: OPERATING ROOM | Age: 54
End: 2024-05-22
Payer: COMMERCIAL

## 2024-05-22 ENCOUNTER — ANESTHESIA EVENT (OUTPATIENT)
Dept: OPERATING ROOM | Age: 54
End: 2024-05-22
Payer: COMMERCIAL

## 2024-05-22 DIAGNOSIS — R91.1 LUNG NODULE: ICD-10-CM

## 2024-05-22 DIAGNOSIS — Z09 S/P LUNG SURGERY, FOLLOW-UP EXAM: Primary | ICD-10-CM

## 2024-05-22 DIAGNOSIS — Z01.818 PREOP TESTING: ICD-10-CM

## 2024-05-22 DIAGNOSIS — R91.8 LUNG MASS: ICD-10-CM

## 2024-05-22 LAB
ABO + RH BLD: NORMAL
ANION GAP SERPL CALCULATED.3IONS-SCNC: 13 MMOL/L (ref 3–16)
ANION GAP SERPL CALCULATED.3IONS-SCNC: 16 MMOL/L (ref 3–16)
APTT BLD: 28.5 SEC (ref 22.1–36.4)
BACTERIA URNS QL MICRO: NORMAL /HPF
BASE EXCESS BLDA CALC-SCNC: -4 MMOL/L (ref -3–3)
BASE EXCESS BLDA CALC-SCNC: -5 MMOL/L (ref -3–3)
BASE EXCESS BLDA CALC-SCNC: -5 MMOL/L (ref -3–3)
BASE EXCESS BLDA CALC-SCNC: -6.5 MMOL/L (ref -3–3)
BASOPHILS # BLD: 0.1 K/UL (ref 0–0.2)
BASOPHILS NFR BLD: 0.8 %
BILIRUB UR QL STRIP.AUTO: NEGATIVE
BLD GP AB SCN SERPL QL: NORMAL
BUN SERPL-MCNC: 13 MG/DL (ref 7–20)
BUN SERPL-MCNC: 14 MG/DL (ref 7–20)
CA-I BLD-SCNC: 1.19 MMOL/L (ref 1.12–1.32)
CALCIUM SERPL-MCNC: 7.9 MG/DL (ref 8.3–10.6)
CALCIUM SERPL-MCNC: 9.7 MG/DL (ref 8.3–10.6)
CHLORIDE BLD-SCNC: 112 MMOL/L (ref 99–110)
CHLORIDE SERPL-SCNC: 102 MMOL/L (ref 99–110)
CHLORIDE SERPL-SCNC: 106 MMOL/L (ref 99–110)
CLARITY UR: CLEAR
CO2 BLDA-SCNC: 24 MMOL/L
CO2 BLDA-SCNC: 24 MMOL/L
CO2 BLDA-SCNC: 27 MMOL/L
CO2 BLDA-SCNC: 51.7 MMOL/L
CO2 SERPL-SCNC: 18 MMOL/L (ref 21–32)
CO2 SERPL-SCNC: 21 MMOL/L (ref 21–32)
COHGB MFR BLDA: 1.1 % (ref 0–1.5)
COLOR UR: YELLOW
CREAT SERPL-MCNC: 0.7 MG/DL (ref 0.6–1.1)
CREAT SERPL-MCNC: 0.7 MG/DL (ref 0.6–1.1)
DEPRECATED RDW RBC AUTO: 13.9 % (ref 12.4–15.4)
DEPRECATED RDW RBC AUTO: 14.2 % (ref 12.4–15.4)
EOSINOPHIL # BLD: 0.3 K/UL (ref 0–0.6)
EOSINOPHIL NFR BLD: 4.9 %
EPI CELLS #/AREA URNS AUTO: 3 /HPF (ref 0–5)
GFR SERPLBLD CREATININE-BSD FMLA CKD-EPI: >90 ML/MIN/{1.73_M2}
GFR SERPLBLD CREATININE-BSD FMLA CKD-EPI: >90 ML/MIN/{1.73_M2}
GLUCOSE BLD-MCNC: 164 MG/DL (ref 70–99)
GLUCOSE SERPL-MCNC: 174 MG/DL (ref 70–99)
GLUCOSE SERPL-MCNC: 98 MG/DL (ref 70–99)
GLUCOSE UR STRIP.AUTO-MCNC: NEGATIVE MG/DL
HCO3 BLDA-SCNC: 21.5 MMOL/L (ref 21–29)
HCO3 BLDA-SCNC: 22.6 MMOL/L (ref 21–29)
HCO3 BLDA-SCNC: 22.6 MMOL/L (ref 21–29)
HCO3 BLDA-SCNC: 24.5 MMOL/L (ref 21–29)
HCT VFR BLD AUTO: 37.9 % (ref 36–48)
HCT VFR BLD AUTO: 38 % (ref 36–48)
HCT VFR BLD AUTO: 39.3 % (ref 36–48)
HGB BLD CALC-MCNC: 12.9 GM/DL (ref 12–16)
HGB BLD-MCNC: 13.1 G/DL (ref 12–16)
HGB BLD-MCNC: 14 G/DL (ref 12–16)
HGB BLDA-MCNC: 13.6 G/DL (ref 12–16)
HGB UR QL STRIP.AUTO: NEGATIVE
HYALINE CASTS #/AREA URNS AUTO: 0 /LPF (ref 0–8)
INR PPP: 0.98 (ref 0.85–1.15)
KETONES UR STRIP.AUTO-MCNC: NEGATIVE MG/DL
LACTATE BLD-SCNC: 0.98 MMOL/L (ref 0.4–2)
LEUKOCYTE ESTERASE UR QL STRIP.AUTO: ABNORMAL
LYMPHOCYTES # BLD: 1.8 K/UL (ref 1–5.1)
LYMPHOCYTES NFR BLD: 26 %
MCH RBC QN AUTO: 30.5 PG (ref 26–34)
MCH RBC QN AUTO: 30.8 PG (ref 26–34)
MCHC RBC AUTO-ENTMCNC: 34.7 G/DL (ref 31–36)
MCHC RBC AUTO-ENTMCNC: 35.5 G/DL (ref 31–36)
MCV RBC AUTO: 86.5 FL (ref 80–100)
MCV RBC AUTO: 87.7 FL (ref 80–100)
METHGB MFR BLDA: 0.4 %
MONOCYTES # BLD: 0.8 K/UL (ref 0–1.3)
MONOCYTES NFR BLD: 11.3 %
NEUTROPHILS # BLD: 3.9 K/UL (ref 1.7–7.7)
NEUTROPHILS NFR BLD: 57 %
NITRITE UR QL STRIP.AUTO: NEGATIVE
O2 THERAPY: ABNORMAL
PCO2 BLDA: 46.5 MM HG (ref 35–45)
PCO2 BLDA: 52.1 MMHG (ref 35–45)
PCO2 BLDA: 55.8 MM HG (ref 35–45)
PCO2 BLDA: 73.6 MM HG (ref 35–45)
PERFORMED ON: ABNORMAL
PH BLDA: 7.13 [PH] (ref 7.35–7.45)
PH BLDA: 7.21 [PH] (ref 7.35–7.45)
PH BLDA: 7.22 [PH] (ref 7.35–7.45)
PH BLDA: 7.29 [PH] (ref 7.35–7.45)
PH UR STRIP.AUTO: 5.5 [PH] (ref 5–8)
PLATELET # BLD AUTO: 247 K/UL (ref 135–450)
PLATELET # BLD AUTO: 269 K/UL (ref 135–450)
PMV BLD AUTO: 7.7 FL (ref 5–10.5)
PMV BLD AUTO: 7.8 FL (ref 5–10.5)
PO2 BLDA: 103.8 MM HG (ref 75–108)
PO2 BLDA: 118 MM HG (ref 75–108)
PO2 BLDA: 132.3 MM HG (ref 75–108)
PO2 BLDA: 186 MMHG (ref 75–108)
POC SAMPLE TYPE: ABNORMAL
POTASSIUM BLD-SCNC: 4.4 MMOL/L (ref 3.5–5.1)
POTASSIUM SERPL-SCNC: 3.8 MMOL/L (ref 3.5–5.1)
POTASSIUM SERPL-SCNC: 3.8 MMOL/L (ref 3.5–5.1)
PROT UR STRIP.AUTO-MCNC: NEGATIVE MG/DL
PROTHROMBIN TIME: 13.2 SEC (ref 11.9–14.9)
RBC # BLD AUTO: 4.32 M/UL (ref 4–5.2)
RBC # BLD AUTO: 4.54 M/UL (ref 4–5.2)
RBC CLUMPS #/AREA URNS AUTO: 2 /HPF (ref 0–4)
SAO2 % BLDA: 96 % (ref 93–100)
SAO2 % BLDA: 98 % (ref 93–100)
SAO2 % BLDA: 98 % (ref 93–100)
SAO2 % BLDA: 99.7 %
SODIUM BLD-SCNC: 141 MMOL/L (ref 136–145)
SODIUM SERPL-SCNC: 137 MMOL/L (ref 136–145)
SODIUM SERPL-SCNC: 139 MMOL/L (ref 136–145)
SP GR UR STRIP.AUTO: 1.01 (ref 1–1.03)
UA DIPSTICK W REFLEX MICRO PNL UR: YES
URN SPEC COLLECT METH UR: ABNORMAL
UROBILINOGEN UR STRIP-ACNC: 1 E.U./DL
WBC # BLD AUTO: 16.4 K/UL (ref 4–11)
WBC # BLD AUTO: 6.8 K/UL (ref 4–11)
WBC #/AREA URNS AUTO: 2 /HPF (ref 0–5)

## 2024-05-22 PROCEDURE — 86850 RBC ANTIBODY SCREEN: CPT

## 2024-05-22 PROCEDURE — 85014 HEMATOCRIT: CPT

## 2024-05-22 PROCEDURE — 71045 X-RAY EXAM CHEST 1 VIEW: CPT

## 2024-05-22 PROCEDURE — 2580000003 HC RX 258: Performed by: NURSE ANESTHETIST, CERTIFIED REGISTERED

## 2024-05-22 PROCEDURE — 0BTF0ZZ RESECTION OF RIGHT LOWER LUNG LOBE, OPEN APPROACH: ICD-10-PCS | Performed by: THORACIC SURGERY (CARDIOTHORACIC VASCULAR SURGERY)

## 2024-05-22 PROCEDURE — 86901 BLOOD TYPING SEROLOGIC RH(D): CPT

## 2024-05-22 PROCEDURE — 36415 COLL VENOUS BLD VENIPUNCTURE: CPT

## 2024-05-22 PROCEDURE — 6360000002 HC RX W HCPCS

## 2024-05-22 PROCEDURE — 7100000001 HC PACU RECOVERY - ADDTL 15 MIN: Performed by: THORACIC SURGERY (CARDIOTHORACIC VASCULAR SURGERY)

## 2024-05-22 PROCEDURE — 2500000003 HC RX 250 WO HCPCS

## 2024-05-22 PROCEDURE — 6370000000 HC RX 637 (ALT 250 FOR IP): Performed by: THORACIC SURGERY (CARDIOTHORACIC VASCULAR SURGERY)

## 2024-05-22 PROCEDURE — 86923 COMPATIBILITY TEST ELECTRIC: CPT

## 2024-05-22 PROCEDURE — 3600000019 HC SURGERY ROBOT ADDTL 15MIN: Performed by: THORACIC SURGERY (CARDIOTHORACIC VASCULAR SURGERY)

## 2024-05-22 PROCEDURE — 2500000003 HC RX 250 WO HCPCS: Performed by: NURSE ANESTHETIST, CERTIFIED REGISTERED

## 2024-05-22 PROCEDURE — 6360000002 HC RX W HCPCS: Performed by: NURSE ANESTHETIST, CERTIFIED REGISTERED

## 2024-05-22 PROCEDURE — 88342 IMHCHEM/IMCYTCHM 1ST ANTB: CPT

## 2024-05-22 PROCEDURE — 85610 PROTHROMBIN TIME: CPT

## 2024-05-22 PROCEDURE — 6360000002 HC RX W HCPCS: Performed by: THORACIC SURGERY (CARDIOTHORACIC VASCULAR SURGERY)

## 2024-05-22 PROCEDURE — 6370000000 HC RX 637 (ALT 250 FOR IP): Performed by: ANESTHESIOLOGY

## 2024-05-22 PROCEDURE — 3600000009 HC SURGERY ROBOT BASE: Performed by: THORACIC SURGERY (CARDIOTHORACIC VASCULAR SURGERY)

## 2024-05-22 PROCEDURE — 6360000002 HC RX W HCPCS: Performed by: ANESTHESIOLOGY

## 2024-05-22 PROCEDURE — 0BTD0ZZ RESECTION OF RIGHT MIDDLE LUNG LOBE, OPEN APPROACH: ICD-10-PCS | Performed by: THORACIC SURGERY (CARDIOTHORACIC VASCULAR SURGERY)

## 2024-05-22 PROCEDURE — 07B70ZZ EXCISION OF THORAX LYMPHATIC, OPEN APPROACH: ICD-10-PCS | Performed by: THORACIC SURGERY (CARDIOTHORACIC VASCULAR SURGERY)

## 2024-05-22 PROCEDURE — C1889 IMPLANT/INSERT DEVICE, NOC: HCPCS | Performed by: THORACIC SURGERY (CARDIOTHORACIC VASCULAR SURGERY)

## 2024-05-22 PROCEDURE — P9045 ALBUMIN (HUMAN), 5%, 250 ML: HCPCS | Performed by: THORACIC SURGERY (CARDIOTHORACIC VASCULAR SURGERY)

## 2024-05-22 PROCEDURE — 88305 TISSUE EXAM BY PATHOLOGIST: CPT

## 2024-05-22 PROCEDURE — 5A09457 ASSISTANCE WITH RESPIRATORY VENTILATION, 24-96 CONSECUTIVE HOURS, CONTINUOUS POSITIVE AIRWAY PRESSURE: ICD-10-PCS | Performed by: THORACIC SURGERY (CARDIOTHORACIC VASCULAR SURGERY)

## 2024-05-22 PROCEDURE — 88341 IMHCHEM/IMCYTCHM EA ADD ANTB: CPT

## 2024-05-22 PROCEDURE — 85027 COMPLETE CBC AUTOMATED: CPT

## 2024-05-22 PROCEDURE — 2720000010 HC SURG SUPPLY STERILE: Performed by: THORACIC SURGERY (CARDIOTHORACIC VASCULAR SURGERY)

## 2024-05-22 PROCEDURE — C1781 MESH (IMPLANTABLE): HCPCS | Performed by: THORACIC SURGERY (CARDIOTHORACIC VASCULAR SURGERY)

## 2024-05-22 PROCEDURE — 82803 BLOOD GASES ANY COMBINATION: CPT

## 2024-05-22 PROCEDURE — 88312 SPECIAL STAINS GROUP 1: CPT

## 2024-05-22 PROCEDURE — 2700000000 HC OXYGEN THERAPY PER DAY

## 2024-05-22 PROCEDURE — 94761 N-INVAS EAR/PLS OXIMETRY MLT: CPT

## 2024-05-22 PROCEDURE — 94660 CPAP INITIATION&MGMT: CPT

## 2024-05-22 PROCEDURE — 6370000000 HC RX 637 (ALT 250 FOR IP)

## 2024-05-22 PROCEDURE — 3700000001 HC ADD 15 MINUTES (ANESTHESIA): Performed by: THORACIC SURGERY (CARDIOTHORACIC VASCULAR SURGERY)

## 2024-05-22 PROCEDURE — 82435 ASSAY OF BLOOD CHLORIDE: CPT

## 2024-05-22 PROCEDURE — A4217 STERILE WATER/SALINE, 500 ML: HCPCS | Performed by: THORACIC SURGERY (CARDIOTHORACIC VASCULAR SURGERY)

## 2024-05-22 PROCEDURE — 2580000003 HC RX 258: Performed by: THORACIC SURGERY (CARDIOTHORACIC VASCULAR SURGERY)

## 2024-05-22 PROCEDURE — A4216 STERILE WATER/SALINE, 10 ML: HCPCS | Performed by: THORACIC SURGERY (CARDIOTHORACIC VASCULAR SURGERY)

## 2024-05-22 PROCEDURE — 76942 ECHO GUIDE FOR BIOPSY: CPT | Performed by: ANESTHESIOLOGY

## 2024-05-22 PROCEDURE — 3E0T3BZ INTRODUCTION OF ANESTHETIC AGENT INTO PERIPHERAL NERVES AND PLEXI, PERCUTANEOUS APPROACH: ICD-10-PCS | Performed by: THORACIC SURGERY (CARDIOTHORACIC VASCULAR SURGERY)

## 2024-05-22 PROCEDURE — 88309 TISSUE EXAM BY PATHOLOGIST: CPT

## 2024-05-22 PROCEDURE — C1729 CATH, DRAINAGE: HCPCS | Performed by: THORACIC SURGERY (CARDIOTHORACIC VASCULAR SURGERY)

## 2024-05-22 PROCEDURE — 81001 URINALYSIS AUTO W/SCOPE: CPT

## 2024-05-22 PROCEDURE — 88360 TUMOR IMMUNOHISTOCHEM/MANUAL: CPT

## 2024-05-22 PROCEDURE — 2709999900 HC NON-CHARGEABLE SUPPLY: Performed by: THORACIC SURGERY (CARDIOTHORACIC VASCULAR SURGERY)

## 2024-05-22 PROCEDURE — 82330 ASSAY OF CALCIUM: CPT

## 2024-05-22 PROCEDURE — C9113 INJ PANTOPRAZOLE SODIUM, VIA: HCPCS | Performed by: THORACIC SURGERY (CARDIOTHORACIC VASCULAR SURGERY)

## 2024-05-22 PROCEDURE — 6370000000 HC RX 637 (ALT 250 FOR IP): Performed by: NURSE ANESTHETIST, CERTIFIED REGISTERED

## 2024-05-22 PROCEDURE — 3700000000 HC ANESTHESIA ATTENDED CARE: Performed by: THORACIC SURGERY (CARDIOTHORACIC VASCULAR SURGERY)

## 2024-05-22 PROCEDURE — 84132 ASSAY OF SERUM POTASSIUM: CPT

## 2024-05-22 PROCEDURE — 94640 AIRWAY INHALATION TREATMENT: CPT

## 2024-05-22 PROCEDURE — S2900 ROBOTIC SURGICAL SYSTEM: HCPCS | Performed by: THORACIC SURGERY (CARDIOTHORACIC VASCULAR SURGERY)

## 2024-05-22 PROCEDURE — 86900 BLOOD TYPING SEROLOGIC ABO: CPT

## 2024-05-22 PROCEDURE — 2580000003 HC RX 258

## 2024-05-22 PROCEDURE — 82947 ASSAY GLUCOSE BLOOD QUANT: CPT

## 2024-05-22 PROCEDURE — 84295 ASSAY OF SERUM SODIUM: CPT

## 2024-05-22 PROCEDURE — 80048 BASIC METABOLIC PNL TOTAL CA: CPT

## 2024-05-22 PROCEDURE — 2000000000 HC ICU R&B

## 2024-05-22 PROCEDURE — 85730 THROMBOPLASTIN TIME PARTIAL: CPT

## 2024-05-22 PROCEDURE — 7100000000 HC PACU RECOVERY - FIRST 15 MIN: Performed by: THORACIC SURGERY (CARDIOTHORACIC VASCULAR SURGERY)

## 2024-05-22 PROCEDURE — 85025 COMPLETE CBC W/AUTO DIFF WBC: CPT

## 2024-05-22 PROCEDURE — C9290 INJ, BUPIVACAINE LIPOSOME: HCPCS | Performed by: THORACIC SURGERY (CARDIOTHORACIC VASCULAR SURGERY)

## 2024-05-22 PROCEDURE — 83605 ASSAY OF LACTIC ACID: CPT

## 2024-05-22 DEVICE — HORIZON TI SMALL RED 6 CLIPS/CART
Type: IMPLANTABLE DEVICE | Site: CHEST | Status: FUNCTIONAL
Brand: WECK

## 2024-05-22 DEVICE — CLIP INT L POLYMER LOK LIG HEM O LOK (6EA/PK): Type: IMPLANTABLE DEVICE | Site: CHEST  WALL | Status: FUNCTIONAL

## 2024-05-22 RX ORDER — IPRATROPIUM BROMIDE AND ALBUTEROL SULFATE 2.5; .5 MG/3ML; MG/3ML
1 SOLUTION RESPIRATORY (INHALATION)
Status: DISCONTINUED | OUTPATIENT
Start: 2024-05-22 | End: 2024-05-23

## 2024-05-22 RX ORDER — MEPERIDINE HYDROCHLORIDE 25 MG/ML
12.5 INJECTION INTRAMUSCULAR; INTRAVENOUS; SUBCUTANEOUS
Status: DISCONTINUED | OUTPATIENT
Start: 2024-05-22 | End: 2024-05-22 | Stop reason: HOSPADM

## 2024-05-22 RX ORDER — ROPIVACAINE HYDROCHLORIDE 5 MG/ML
INJECTION, SOLUTION EPIDURAL; INFILTRATION; PERINEURAL
Status: COMPLETED | OUTPATIENT
Start: 2024-05-22 | End: 2024-05-22

## 2024-05-22 RX ORDER — ACETAMINOPHEN 325 MG/1
650 TABLET ORAL ONCE
Status: COMPLETED | OUTPATIENT
Start: 2024-05-22 | End: 2024-05-22

## 2024-05-22 RX ORDER — GABAPENTIN 300 MG/1
300 CAPSULE ORAL 3 TIMES DAILY
Status: DISCONTINUED | OUTPATIENT
Start: 2024-05-22 | End: 2024-05-29

## 2024-05-22 RX ORDER — SODIUM CHLORIDE 0.9 % (FLUSH) 0.9 %
5-40 SYRINGE (ML) INJECTION EVERY 12 HOURS SCHEDULED
Status: DISCONTINUED | OUTPATIENT
Start: 2024-05-22 | End: 2024-05-22 | Stop reason: HOSPADM

## 2024-05-22 RX ORDER — SODIUM CHLORIDE 9 MG/ML
INJECTION, SOLUTION INTRAVENOUS PRN
Status: DISCONTINUED | OUTPATIENT
Start: 2024-05-22 | End: 2024-05-22 | Stop reason: HOSPADM

## 2024-05-22 RX ORDER — NALOXONE HYDROCHLORIDE 0.4 MG/ML
INJECTION, SOLUTION INTRAMUSCULAR; INTRAVENOUS; SUBCUTANEOUS PRN
Status: DISCONTINUED | OUTPATIENT
Start: 2024-05-22 | End: 2024-05-22 | Stop reason: HOSPADM

## 2024-05-22 RX ORDER — SODIUM CHLORIDE 9 MG/ML
INJECTION, SOLUTION INTRAVENOUS CONTINUOUS
Status: DISCONTINUED | OUTPATIENT
Start: 2024-05-22 | End: 2024-05-22

## 2024-05-22 RX ORDER — KETAMINE HYDROCHLORIDE 10 MG/ML
INJECTION, SOLUTION INTRAMUSCULAR; INTRAVENOUS PRN
Status: DISCONTINUED | OUTPATIENT
Start: 2024-05-22 | End: 2024-05-22 | Stop reason: SDUPTHER

## 2024-05-22 RX ORDER — KETOROLAC TROMETHAMINE 30 MG/ML
30 INJECTION, SOLUTION INTRAMUSCULAR; INTRAVENOUS EVERY 6 HOURS
Status: COMPLETED | OUTPATIENT
Start: 2024-05-22 | End: 2024-05-27

## 2024-05-22 RX ORDER — DEXMEDETOMIDINE HYDROCHLORIDE 4 UG/ML
.05-.3 INJECTION, SOLUTION INTRAVENOUS CONTINUOUS
Status: DISCONTINUED | OUTPATIENT
Start: 2024-05-22 | End: 2024-05-24

## 2024-05-22 RX ORDER — SODIUM CHLORIDE 0.9 % (FLUSH) 0.9 %
5-40 SYRINGE (ML) INJECTION EVERY 12 HOURS SCHEDULED
Status: DISCONTINUED | OUTPATIENT
Start: 2024-05-22 | End: 2024-05-31 | Stop reason: HOSPADM

## 2024-05-22 RX ORDER — ALBUMIN, HUMAN INJ 5% 5 %
25 SOLUTION INTRAVENOUS ONCE
Status: COMPLETED | OUTPATIENT
Start: 2024-05-22 | End: 2024-05-22

## 2024-05-22 RX ORDER — SODIUM CHLORIDE 9 MG/ML
INJECTION, SOLUTION INTRAVENOUS CONTINUOUS PRN
Status: DISCONTINUED | OUTPATIENT
Start: 2024-05-22 | End: 2024-05-22 | Stop reason: SDUPTHER

## 2024-05-22 RX ORDER — SENNA AND DOCUSATE SODIUM 50; 8.6 MG/1; MG/1
1 TABLET, FILM COATED ORAL 2 TIMES DAILY
Status: DISCONTINUED | OUTPATIENT
Start: 2024-05-22 | End: 2024-05-31 | Stop reason: HOSPADM

## 2024-05-22 RX ORDER — ONDANSETRON 2 MG/ML
4 INJECTION INTRAMUSCULAR; INTRAVENOUS EVERY 6 HOURS PRN
Status: DISCONTINUED | OUTPATIENT
Start: 2024-05-22 | End: 2024-05-31 | Stop reason: HOSPADM

## 2024-05-22 RX ORDER — HYDRALAZINE HYDROCHLORIDE 20 MG/ML
10 INJECTION INTRAMUSCULAR; INTRAVENOUS
Status: DISCONTINUED | OUTPATIENT
Start: 2024-05-22 | End: 2024-05-22 | Stop reason: HOSPADM

## 2024-05-22 RX ORDER — DEXTROSE MONOHYDRATE AND SODIUM CHLORIDE 5; .45 G/100ML; G/100ML
INJECTION, SOLUTION INTRAVENOUS CONTINUOUS
Status: DISCONTINUED | OUTPATIENT
Start: 2024-05-22 | End: 2024-05-24

## 2024-05-22 RX ORDER — FENTANYL CITRATE 50 UG/ML
25 INJECTION, SOLUTION INTRAMUSCULAR; INTRAVENOUS EVERY 5 MIN PRN
Status: COMPLETED | OUTPATIENT
Start: 2024-05-22 | End: 2024-05-22

## 2024-05-22 RX ORDER — SODIUM CHLORIDE 9 MG/ML
INJECTION, SOLUTION INTRAVENOUS PRN
Status: DISCONTINUED | OUTPATIENT
Start: 2024-05-22 | End: 2024-05-31 | Stop reason: HOSPADM

## 2024-05-22 RX ORDER — MIDAZOLAM HYDROCHLORIDE 1 MG/ML
INJECTION INTRAMUSCULAR; INTRAVENOUS
Status: COMPLETED
Start: 2024-05-22 | End: 2024-05-22

## 2024-05-22 RX ORDER — DEXMEDETOMIDINE HYDROCHLORIDE 4 UG/ML
INJECTION, SOLUTION INTRAVENOUS
Status: DISCONTINUED
Start: 2024-05-22 | End: 2024-05-22 | Stop reason: SDUPTHER

## 2024-05-22 RX ORDER — MIDAZOLAM HYDROCHLORIDE 2 MG/2ML
2 INJECTION, SOLUTION INTRAMUSCULAR; INTRAVENOUS
Status: COMPLETED | OUTPATIENT
Start: 2024-05-22 | End: 2024-05-22

## 2024-05-22 RX ORDER — KETOROLAC TROMETHAMINE 30 MG/ML
INJECTION, SOLUTION INTRAMUSCULAR; INTRAVENOUS
Status: COMPLETED
Start: 2024-05-22 | End: 2024-05-22

## 2024-05-22 RX ORDER — ONDANSETRON 2 MG/ML
4 INJECTION INTRAMUSCULAR; INTRAVENOUS
Status: DISCONTINUED | OUTPATIENT
Start: 2024-05-22 | End: 2024-05-22 | Stop reason: HOSPADM

## 2024-05-22 RX ORDER — ALBUTEROL SULFATE 90 UG/1
AEROSOL, METERED RESPIRATORY (INHALATION) PRN
Status: DISCONTINUED | OUTPATIENT
Start: 2024-05-22 | End: 2024-05-22 | Stop reason: SDUPTHER

## 2024-05-22 RX ORDER — PANTOPRAZOLE SODIUM 40 MG/1
TABLET, DELAYED RELEASE ORAL
Status: DISCONTINUED
Start: 2024-05-22 | End: 2024-05-22 | Stop reason: WASHOUT

## 2024-05-22 RX ORDER — DEXAMETHASONE SODIUM PHOSPHATE 10 MG/ML
INJECTION, SOLUTION INTRAMUSCULAR; INTRAVENOUS PRN
Status: DISCONTINUED | OUTPATIENT
Start: 2024-05-22 | End: 2024-05-22 | Stop reason: SDUPTHER

## 2024-05-22 RX ORDER — ACETAMINOPHEN 325 MG/1
650 TABLET ORAL EVERY 6 HOURS SCHEDULED
Status: DISCONTINUED | OUTPATIENT
Start: 2024-05-22 | End: 2024-05-22

## 2024-05-22 RX ORDER — PHENYLEPHRINE HCL IN 0.9% NACL 1 MG/10 ML
SYRINGE (ML) INTRAVENOUS PRN
Status: DISCONTINUED | OUTPATIENT
Start: 2024-05-22 | End: 2024-05-22 | Stop reason: SDUPTHER

## 2024-05-22 RX ORDER — SODIUM CHLORIDE 0.9 % (FLUSH) 0.9 %
5-40 SYRINGE (ML) INJECTION PRN
Status: DISCONTINUED | OUTPATIENT
Start: 2024-05-22 | End: 2024-05-22 | Stop reason: HOSPADM

## 2024-05-22 RX ORDER — POLYETHYLENE GLYCOL 3350 17 G/17G
17 POWDER, FOR SOLUTION ORAL DAILY
Status: DISCONTINUED | OUTPATIENT
Start: 2024-05-22 | End: 2024-05-31 | Stop reason: HOSPADM

## 2024-05-22 RX ORDER — ACETAMINOPHEN 500 MG
1000 TABLET ORAL EVERY 6 HOURS SCHEDULED
Status: DISCONTINUED | OUTPATIENT
Start: 2024-05-22 | End: 2024-05-31 | Stop reason: HOSPADM

## 2024-05-22 RX ORDER — SODIUM CHLORIDE 9 MG/ML
INJECTION, SOLUTION INTRAVENOUS CONTINUOUS
Status: DISCONTINUED | OUTPATIENT
Start: 2024-05-22 | End: 2024-05-22 | Stop reason: HOSPADM

## 2024-05-22 RX ORDER — TRAMADOL HYDROCHLORIDE 50 MG/1
50 TABLET ORAL EVERY 6 HOURS PRN
Status: DISCONTINUED | OUTPATIENT
Start: 2024-05-22 | End: 2024-05-31 | Stop reason: HOSPADM

## 2024-05-22 RX ORDER — ONDANSETRON 4 MG/1
4 TABLET, ORALLY DISINTEGRATING ORAL EVERY 8 HOURS PRN
Status: DISCONTINUED | OUTPATIENT
Start: 2024-05-22 | End: 2024-05-31 | Stop reason: HOSPADM

## 2024-05-22 RX ORDER — ONDANSETRON 2 MG/ML
INJECTION INTRAMUSCULAR; INTRAVENOUS PRN
Status: DISCONTINUED | OUTPATIENT
Start: 2024-05-22 | End: 2024-05-22 | Stop reason: SDUPTHER

## 2024-05-22 RX ORDER — LABETALOL HYDROCHLORIDE 5 MG/ML
10 INJECTION, SOLUTION INTRAVENOUS
Status: DISCONTINUED | OUTPATIENT
Start: 2024-05-22 | End: 2024-05-22 | Stop reason: HOSPADM

## 2024-05-22 RX ORDER — APREPITANT 40 MG/1
40 CAPSULE ORAL ONCE
Status: COMPLETED | OUTPATIENT
Start: 2024-05-22 | End: 2024-05-22

## 2024-05-22 RX ORDER — GABAPENTIN 100 MG/1
100 CAPSULE ORAL 3 TIMES DAILY
Status: DISCONTINUED | OUTPATIENT
Start: 2024-05-22 | End: 2024-05-22

## 2024-05-22 RX ORDER — GLYCOPYRROLATE 0.2 MG/ML
INJECTION INTRAMUSCULAR; INTRAVENOUS PRN
Status: DISCONTINUED | OUTPATIENT
Start: 2024-05-22 | End: 2024-05-22 | Stop reason: SDUPTHER

## 2024-05-22 RX ORDER — LIDOCAINE HYDROCHLORIDE 20 MG/ML
INJECTION, SOLUTION EPIDURAL; INFILTRATION; INTRACAUDAL; PERINEURAL PRN
Status: DISCONTINUED | OUTPATIENT
Start: 2024-05-22 | End: 2024-05-22 | Stop reason: SDUPTHER

## 2024-05-22 RX ORDER — FENTANYL CITRATE 50 UG/ML
50 INJECTION, SOLUTION INTRAMUSCULAR; INTRAVENOUS
Status: DISCONTINUED | OUTPATIENT
Start: 2024-05-22 | End: 2024-05-23

## 2024-05-22 RX ORDER — DIPHENHYDRAMINE HYDROCHLORIDE 50 MG/ML
12.5 INJECTION INTRAMUSCULAR; INTRAVENOUS
Status: DISCONTINUED | OUTPATIENT
Start: 2024-05-22 | End: 2024-05-22 | Stop reason: HOSPADM

## 2024-05-22 RX ORDER — BUPIVACAINE HYDROCHLORIDE 5 MG/ML
INJECTION, SOLUTION EPIDURAL; INTRACAUDAL
Status: COMPLETED | OUTPATIENT
Start: 2024-05-22 | End: 2024-05-22

## 2024-05-22 RX ORDER — OXYCODONE HYDROCHLORIDE 5 MG/1
5 TABLET ORAL
Status: DISCONTINUED | OUTPATIENT
Start: 2024-05-22 | End: 2024-05-22 | Stop reason: HOSPADM

## 2024-05-22 RX ORDER — EPHEDRINE SULFATE 50 MG/ML
INJECTION INTRAVENOUS PRN
Status: DISCONTINUED | OUTPATIENT
Start: 2024-05-22 | End: 2024-05-22 | Stop reason: SDUPTHER

## 2024-05-22 RX ORDER — PROPOFOL 10 MG/ML
INJECTION, EMULSION INTRAVENOUS PRN
Status: DISCONTINUED | OUTPATIENT
Start: 2024-05-22 | End: 2024-05-22 | Stop reason: SDUPTHER

## 2024-05-22 RX ORDER — SODIUM CHLORIDE 0.9 % (FLUSH) 0.9 %
5-40 SYRINGE (ML) INJECTION PRN
Status: DISCONTINUED | OUTPATIENT
Start: 2024-05-22 | End: 2024-05-31 | Stop reason: HOSPADM

## 2024-05-22 RX ORDER — ROCURONIUM BROMIDE 10 MG/ML
INJECTION, SOLUTION INTRAVENOUS PRN
Status: DISCONTINUED | OUTPATIENT
Start: 2024-05-22 | End: 2024-05-22 | Stop reason: SDUPTHER

## 2024-05-22 RX ORDER — MAGNESIUM SULFATE HEPTAHYDRATE 500 MG/ML
INJECTION, SOLUTION INTRAMUSCULAR; INTRAVENOUS PRN
Status: DISCONTINUED | OUTPATIENT
Start: 2024-05-22 | End: 2024-05-22 | Stop reason: SDUPTHER

## 2024-05-22 RX ORDER — HYDROMORPHONE HYDROCHLORIDE 2 MG/ML
0.5 INJECTION, SOLUTION INTRAMUSCULAR; INTRAVENOUS; SUBCUTANEOUS EVERY 5 MIN PRN
Status: COMPLETED | OUTPATIENT
Start: 2024-05-22 | End: 2024-05-22

## 2024-05-22 RX ORDER — PROCHLORPERAZINE EDISYLATE 5 MG/ML
5 INJECTION INTRAMUSCULAR; INTRAVENOUS
Status: DISCONTINUED | OUTPATIENT
Start: 2024-05-22 | End: 2024-05-22 | Stop reason: HOSPADM

## 2024-05-22 RX ORDER — TRAMADOL HYDROCHLORIDE 50 MG/1
100 TABLET ORAL EVERY 6 HOURS PRN
Status: DISCONTINUED | OUTPATIENT
Start: 2024-05-22 | End: 2024-05-31 | Stop reason: HOSPADM

## 2024-05-22 RX ORDER — ENOXAPARIN SODIUM 100 MG/ML
30 INJECTION SUBCUTANEOUS 2 TIMES DAILY
Status: DISCONTINUED | OUTPATIENT
Start: 2024-05-22 | End: 2024-05-31 | Stop reason: HOSPADM

## 2024-05-22 RX ORDER — IPRATROPIUM BROMIDE AND ALBUTEROL SULFATE 2.5; .5 MG/3ML; MG/3ML
1 SOLUTION RESPIRATORY (INHALATION)
Status: DISCONTINUED | OUTPATIENT
Start: 2024-05-22 | End: 2024-05-22

## 2024-05-22 RX ORDER — FENTANYL CITRATE 50 UG/ML
INJECTION, SOLUTION INTRAMUSCULAR; INTRAVENOUS PRN
Status: DISCONTINUED | OUTPATIENT
Start: 2024-05-22 | End: 2024-05-22 | Stop reason: SDUPTHER

## 2024-05-22 RX ADMIN — LIDOCAINE HYDROCHLORIDE 100 MG: 20 INJECTION, SOLUTION EPIDURAL; INFILTRATION; INTRACAUDAL; PERINEURAL at 07:41

## 2024-05-22 RX ADMIN — SENNOSIDES AND DOCUSATE SODIUM 1 TABLET: 50; 8.6 TABLET ORAL at 20:53

## 2024-05-22 RX ADMIN — GABAPENTIN 300 MG: 300 CAPSULE ORAL at 17:57

## 2024-05-22 RX ADMIN — CEFAZOLIN 2000 MG: 2 INJECTION, POWDER, FOR SOLUTION INTRAMUSCULAR; INTRAVENOUS at 11:50

## 2024-05-22 RX ADMIN — ROCURONIUM BROMIDE 20 MG: 10 INJECTION, SOLUTION INTRAVENOUS at 09:52

## 2024-05-22 RX ADMIN — MAGNESIUM SULFATE HEPTAHYDRATE 1 G: 500 INJECTION, SOLUTION INTRAMUSCULAR; INTRAVENOUS at 08:38

## 2024-05-22 RX ADMIN — SODIUM BICARBONATE 50 MEQ: 84 INJECTION, SOLUTION INTRAVENOUS at 16:00

## 2024-05-22 RX ADMIN — SODIUM CHLORIDE: 9 INJECTION, SOLUTION INTRAVENOUS at 18:13

## 2024-05-22 RX ADMIN — FENTANYL CITRATE 50 MCG: 50 INJECTION, SOLUTION INTRAMUSCULAR; INTRAVENOUS at 07:38

## 2024-05-22 RX ADMIN — GLYCOPYRROLATE 0.2 MG: 0.2 INJECTION INTRAMUSCULAR; INTRAVENOUS at 08:44

## 2024-05-22 RX ADMIN — ENOXAPARIN SODIUM 30 MG: 100 INJECTION SUBCUTANEOUS at 20:53

## 2024-05-22 RX ADMIN — ROCURONIUM BROMIDE 30 MG: 10 INJECTION, SOLUTION INTRAVENOUS at 08:32

## 2024-05-22 RX ADMIN — SODIUM CHLORIDE: 9 INJECTION, SOLUTION INTRAVENOUS at 07:34

## 2024-05-22 RX ADMIN — HYDROMORPHONE HYDROCHLORIDE 0.5 MG: 2 INJECTION, SOLUTION INTRAMUSCULAR; INTRAVENOUS; SUBCUTANEOUS at 14:59

## 2024-05-22 RX ADMIN — PANTOPRAZOLE SODIUM 40 MG: 40 INJECTION, POWDER, FOR SOLUTION INTRAVENOUS at 07:11

## 2024-05-22 RX ADMIN — FENTANYL CITRATE 25 MCG: 0.05 INJECTION, SOLUTION INTRAMUSCULAR; INTRAVENOUS at 14:03

## 2024-05-22 RX ADMIN — KETAMINE HYDROCHLORIDE 20 MG: 10 INJECTION, SOLUTION INTRAMUSCULAR; INTRAVENOUS at 09:50

## 2024-05-22 RX ADMIN — DEXAMETHASONE SODIUM PHOSPHATE 8 MG: 10 INJECTION, SOLUTION INTRAMUSCULAR; INTRAVENOUS at 07:51

## 2024-05-22 RX ADMIN — ONDANSETRON 4 MG: 2 INJECTION INTRAMUSCULAR; INTRAVENOUS at 12:32

## 2024-05-22 RX ADMIN — EPHEDRINE SULFATE 5 MG: 50 INJECTION, SOLUTION INTRAVENOUS at 11:10

## 2024-05-22 RX ADMIN — CEFAZOLIN 2000 MG: 2 INJECTION, POWDER, FOR SOLUTION INTRAMUSCULAR; INTRAVENOUS at 07:51

## 2024-05-22 RX ADMIN — ROCURONIUM BROMIDE 50 MG: 10 INJECTION, SOLUTION INTRAVENOUS at 07:41

## 2024-05-22 RX ADMIN — KETOROLAC TROMETHAMINE 30 MG: 30 INJECTION, SOLUTION INTRAMUSCULAR at 20:52

## 2024-05-22 RX ADMIN — SODIUM CHLORIDE: 9 INJECTION, SOLUTION INTRAVENOUS at 10:01

## 2024-05-22 RX ADMIN — EPHEDRINE SULFATE 5 MG: 50 INJECTION, SOLUTION INTRAVENOUS at 11:12

## 2024-05-22 RX ADMIN — PROPOFOL 20 MG: 10 INJECTION, EMULSION INTRAVENOUS at 07:47

## 2024-05-22 RX ADMIN — FENTANYL CITRATE 25 MCG: 0.05 INJECTION, SOLUTION INTRAMUSCULAR; INTRAVENOUS at 14:46

## 2024-05-22 RX ADMIN — EPHEDRINE SULFATE 5 MG: 50 INJECTION, SOLUTION INTRAVENOUS at 08:47

## 2024-05-22 RX ADMIN — SUGAMMADEX 200 MG: 100 INJECTION, SOLUTION INTRAVENOUS at 13:09

## 2024-05-22 RX ADMIN — Medication 50 MEQ: at 16:00

## 2024-05-22 RX ADMIN — EPHEDRINE SULFATE 5 MG: 50 INJECTION, SOLUTION INTRAVENOUS at 12:43

## 2024-05-22 RX ADMIN — SODIUM CHLORIDE: 9 INJECTION, SOLUTION INTRAVENOUS at 06:56

## 2024-05-22 RX ADMIN — ALBUMIN (HUMAN) 25 G: 12.5 INJECTION, SOLUTION INTRAVENOUS at 19:45

## 2024-05-22 RX ADMIN — MIDAZOLAM 2 MG: 1 INJECTION INTRAMUSCULAR; INTRAVENOUS at 07:15

## 2024-05-22 RX ADMIN — DEXMEDETOMIDINE HYDROCHLORIDE 0.2 MCG/KG/HR: 400 INJECTION, SOLUTION INTRAVENOUS at 15:54

## 2024-05-22 RX ADMIN — FENTANYL CITRATE 25 MCG: 0.05 INJECTION, SOLUTION INTRAMUSCULAR; INTRAVENOUS at 14:27

## 2024-05-22 RX ADMIN — FENTANYL CITRATE 50 MCG: 50 INJECTION, SOLUTION INTRAMUSCULAR; INTRAVENOUS at 08:05

## 2024-05-22 RX ADMIN — PROPOFOL 20 MG: 10 INJECTION, EMULSION INTRAVENOUS at 07:44

## 2024-05-22 RX ADMIN — KETOROLAC TROMETHAMINE 30 MG: 30 INJECTION, SOLUTION INTRAMUSCULAR; INTRAVENOUS at 16:00

## 2024-05-22 RX ADMIN — FENTANYL CITRATE 50 MCG: 50 INJECTION INTRAMUSCULAR; INTRAVENOUS at 16:05

## 2024-05-22 RX ADMIN — APREPITANT 40 MG: 40 CAPSULE ORAL at 07:11

## 2024-05-22 RX ADMIN — GABAPENTIN 300 MG: 300 CAPSULE ORAL at 20:52

## 2024-05-22 RX ADMIN — DEXMEDETOMIDINE HYDROCHLORIDE 0.2 MCG/KG/HR: 4 INJECTION, SOLUTION INTRAVENOUS at 15:54

## 2024-05-22 RX ADMIN — KETOROLAC TROMETHAMINE 30 MG: 30 INJECTION, SOLUTION INTRAMUSCULAR at 16:00

## 2024-05-22 RX ADMIN — ROPIVACAINE HYDROCHLORIDE 30 ML: 5 INJECTION, SOLUTION EPIDURAL; INFILTRATION; PERINEURAL at 07:16

## 2024-05-22 RX ADMIN — ROCURONIUM BROMIDE 20 MG: 10 INJECTION, SOLUTION INTRAVENOUS at 09:17

## 2024-05-22 RX ADMIN — ACETAMINOPHEN 1000 MG: 500 TABLET ORAL at 17:57

## 2024-05-22 RX ADMIN — HYDROMORPHONE HYDROCHLORIDE 0.5 MG: 2 INJECTION, SOLUTION INTRAMUSCULAR; INTRAVENOUS; SUBCUTANEOUS at 15:20

## 2024-05-22 RX ADMIN — ACETAMINOPHEN 650 MG: 325 TABLET ORAL at 07:11

## 2024-05-22 RX ADMIN — IPRATROPIUM BROMIDE AND ALBUTEROL SULFATE 1 DOSE: .5; 3 SOLUTION RESPIRATORY (INHALATION) at 20:28

## 2024-05-22 RX ADMIN — SODIUM CHLORIDE, PRESERVATIVE FREE 10 ML: 5 INJECTION INTRAVENOUS at 19:46

## 2024-05-22 RX ADMIN — Medication 100 MCG: at 11:38

## 2024-05-22 RX ADMIN — ROCURONIUM BROMIDE 20 MG: 10 INJECTION, SOLUTION INTRAVENOUS at 10:29

## 2024-05-22 RX ADMIN — ALBUTEROL SULFATE 4 PUFF: 90 AEROSOL, METERED RESPIRATORY (INHALATION) at 13:41

## 2024-05-22 RX ADMIN — FENTANYL CITRATE 25 MCG: 0.05 INJECTION, SOLUTION INTRAMUSCULAR; INTRAVENOUS at 13:57

## 2024-05-22 RX ADMIN — DEXTROSE AND SODIUM CHLORIDE: 5; 450 INJECTION, SOLUTION INTRAVENOUS at 18:31

## 2024-05-22 RX ADMIN — PROPOFOL 170 MG: 10 INJECTION, EMULSION INTRAVENOUS at 07:41

## 2024-05-22 RX ADMIN — EPHEDRINE SULFATE 5 MG: 50 INJECTION, SOLUTION INTRAVENOUS at 12:19

## 2024-05-22 RX ADMIN — KETAMINE HYDROCHLORIDE 30 MG: 10 INJECTION, SOLUTION INTRAMUSCULAR; INTRAVENOUS at 08:52

## 2024-05-22 RX ADMIN — POLYETHYLENE GLYCOL 3350 17 G: 17 POWDER, FOR SOLUTION ORAL at 17:58

## 2024-05-22 RX ADMIN — ROCURONIUM BROMIDE 10 MG: 10 INJECTION, SOLUTION INTRAVENOUS at 11:04

## 2024-05-22 RX ADMIN — MIDAZOLAM HYDROCHLORIDE 2 MG: 2 INJECTION, SOLUTION INTRAMUSCULAR; INTRAVENOUS at 07:15

## 2024-05-22 RX ADMIN — FENTANYL CITRATE 50 MCG: 50 INJECTION, SOLUTION INTRAMUSCULAR; INTRAVENOUS at 13:08

## 2024-05-22 ASSESSMENT — PAIN DESCRIPTION - LOCATION
LOCATION: CHEST

## 2024-05-22 ASSESSMENT — PAIN DESCRIPTION - DESCRIPTORS
DESCRIPTORS: ACHING
DESCRIPTORS: SHARP
DESCRIPTORS: ACHING
DESCRIPTORS: SHARP

## 2024-05-22 ASSESSMENT — PAIN SCALES - GENERAL
PAINLEVEL_OUTOF10: 4
PAINLEVEL_OUTOF10: 10
PAINLEVEL_OUTOF10: 10
PAINLEVEL_OUTOF10: 3
PAINLEVEL_OUTOF10: 10
PAINLEVEL_OUTOF10: 2
PAINLEVEL_OUTOF10: 10
PAINLEVEL_OUTOF10: 0
PAINLEVEL_OUTOF10: 10
PAINLEVEL_OUTOF10: 10
PAINLEVEL_OUTOF10: 6
PAINLEVEL_OUTOF10: 7
PAINLEVEL_OUTOF10: 10
PAINLEVEL_OUTOF10: 2

## 2024-05-22 ASSESSMENT — PAIN DESCRIPTION - ORIENTATION
ORIENTATION: RIGHT

## 2024-05-22 ASSESSMENT — PAIN - FUNCTIONAL ASSESSMENT: PAIN_FUNCTIONAL_ASSESSMENT: 0-10

## 2024-05-22 NOTE — FLOWSHEET NOTE
Patient admitted from PACU after right lung resection with Dr. Milan, lines and drains assessed by this RN, tidaling noted in atrium with bubbling on inspiration only. Patient is severe pain, Dr. Milan to bedside with orders for pain management. Temperature noted to be low, warming blanket applied. ABG drawn and patient placed on Bipap.    05/22/24 1536   Vitals   Temp (!) 95.5 °F (35.3 °C)   Temp Source Bladder   Pulse 80   Heart Rate Source Monitor   Respirations 16   /70   MAP (Calculated) 86   BP Location Arterial   BP Method Automatic   Patient Position Semi fowlers   Cardiac Rhythm Sinus rhythm   Pain Assessment   Pain Assessment 0-10   Pain Level 10   Pain Location Chest   Pain Orientation Right   Pain Descriptors Sharp   Oxygen Therapy   SpO2 99 %   Pulse Oximeter Device Mode Continuous   Pulse Oximeter Device Location Finger   O2 Device PAP (positive airway pressure)   FiO2  40 %   Art Line   ABP (Arterial line BP) 119/70   ABP Mean (Arterial Line Mean) 90 mmHg   Arterial Line Location Right radial   Art Line Wave Form Appropriate wave forms        stretcher

## 2024-05-22 NOTE — PROGRESS NOTES
Patient to PACU from OR. Patient moving around restlessly appearing in pain. VSS. X6 incisions w. Surgical glue. 1 staple and 4x4 dressing CDI.

## 2024-05-22 NOTE — ANESTHESIA PROCEDURE NOTES
Arterial Line:    An arterial line was placed using surface landmarks, in the holding area for the following indication(s): continuous blood pressure monitoring and blood sampling needed.    A 20 gauge (size), 1 and 3/4 inch (length), Arrow (type) catheter was placed, Seldinger technique used, into the right radial artery, secured by tape and Tegaderm.  Anesthesia type: Local  Local infiltration: Injection    Events:  patient tolerated procedure well with no complications and EBL < 5mL.  Anesthesiologist: Jae Elder MD  Performed: Anesthesiologist   Preanesthetic Checklist  Completed: patient identified, IV checked, site marked, risks and benefits discussed, surgical/procedural consents, equipment checked, pre-op evaluation, timeout performed, anesthesia consent given, oxygen available and monitors applied/VS acknowledged

## 2024-05-22 NOTE — RT PROTOCOL NOTE
RT Nebulizer Bronchodilator Protocol Note    There is a bronchodilator order in the chart from a provider indicating to follow the RT Bronchodilator Protocol and there is an “Initiate RT Bronchodilator Protocol” order as well (see protocol at bottom of note).    CXR Findings:  XR CHEST PORTABLE    Result Date: 5/22/2024  Questionable loculated basilar pneumothorax.       The findings from the last RT Protocol Assessment were as follows:  Smoking: Smoker 15 pack years or more  Respiratory Pattern: Regular pattern and RR 12-20 bpm  Breath Sounds: Slightly diminished and/or crackles  Cough: Weak, non-productive  Indication for Bronchodilator Therapy:    Bronchodilator Assessment Score: 6    Aerosolized bronchodilator medication orders have been revised according to the RT Nebulizer Bronchodilator Protocol below.    Respiratory Therapist to perform RT Therapy Protocol Assessment initially then follow the protocol.  Repeat RT Therapy Protocol Assessment PRN for score 0-3 or on second treatment, BID, and PRN for scores above 3.    No Indications - adjust the frequency to every 6 hours PRN wheezing or bronchospasm, if no treatments needed after 48 hours then discontinue using Per Protocol order mode.     If indication present, adjust the RT bronchodilator orders based on the Bronchodilator Assessment Score as indicated below.  If a patient is on this medication at home then do not decrease Frequency below that used at home.    0-3 - enter or revise RT bronchodilator order(s) to equivalent RT Bronchodilator order with Frequency of every 4 hours PRN for wheezing or increased work of breathing using Per Protocol order mode.       4-6 - enter or revise RT Bronchodilator order(s) to two equivalent RT bronchodilator orders with one order with BID Frequency and one order with Frequency of every 4 hours PRN wheezing or increased work of breathing using Per Protocol order mode.         7-10 - enter or revise RT Bronchodilator  order(s) to two equivalent RT bronchodilator orders with one order with TID Frequency and one order with Frequency of every 4 hours PRN wheezing or increased work of breathing using Per Protocol order mode.       11-13 - enter or revise RT Bronchodilator order(s) to one equivalent RT bronchodilator order with QID Frequency and an Albuterol order with Frequency of every 4 hours PRN wheezing or increased work of breathing using Per Protocol order mode.      Greater than 13 - enter or revise RT Bronchodilator order(s) to one equivalent RT bronchodilator order with every 4 hours Frequency and an Albuterol order with Frequency of every 2 hours PRN wheezing or increased work of breathing using Per Protocol order mode.     RT to enter RT Home Evaluation for COPD & MDI Assessment order using Per Protocol order mode.    Electronically signed by GIULIA RENTERIA RCP on 5/22/2024 at 3:54 PM

## 2024-05-22 NOTE — OP NOTE
Operative Note      Patient: Cici Hernández  YOB: 1970  MRN: [id number]    Date of Procedure: 5/22/2024    Pre-Op Diagnosis Codes:     * Lung nodule [R91.1]    Post-Op Diagnosis:  Same pathology pending       Procedure(s):  ROBOTIC ASSISTED RIGHT Bi- LOBECTOMY (LOWER AND MIDDLE) WITH MEDIASTINAL LYMPH NODE DISSECTION    Surgeon(s):  Sonya Milan MD    Assistant:   First Assistant: Young Marie RN    Anesthesia: General    Estimated Blood Loss (mL): 400    Complications: Other: Iatrogenic injury to the membranous portion of the right lower lobe bronchus right at the bifurcation to the middle lobe necessitating bilobectomy to avoid narrowing of the middle lobe bronchus.  Specimens:   ID Type Source Tests Collected by Time Destination   A : A.) SUBCARINAL LYMPH NODE Tissue Tissue SURGICAL PATHOLOGY Sonya Milan MD 5/22/2024 0918    B : B) RIGHT  LUNG  LOWER AND MIDDLE LOBES Tissue Tissue SURGICAL PATHOLOGY Sonya Milan MD 5/22/2024 1211    C : C.) LEVEL 4R LYMPH NODE Tissue Tissue SURGICAL PATHOLOGY Sonya Milan MD 5/22/2024 1222    D : D.) LEVEL 7 LYMPH NODE Tissue Tissue SURGICAL PATHOLOGY Sonya Milan MD 5/22/2024 1235        Implants:  Implant Name Type Inv. Item Serial No.  Lot No. LRB No. Used Action   CLIP INT WECK SM WIDE RED TI TRNSVRS GRV CHEVRON SHP W/ PERCIS TIP 6 PER PK - [id number]  CLIP INT WECK SM WIDE RED TI TRNSVRS GRV CHEVRON SHP W/ PERCIS TIP 6 PER PK  [employer] [id number] Right 1 Implanted   CLIP INT L POLYMER TIMI LIG HEM O TIMI (6EA/PK) - [id number]  CLIP INT L POLYMER TIMI LIG HEM O TIMI (6EA/PK)  [employer]- [id number] Right 4 Implanted         Drains:   Chest Tube Right;Other (Comment) Midaxillary 1 (Active)   Chest Tube Airleak Yes 05/22/24 1800   Status Continuous Suction 05/22/24 1800   Suction -20 cm H2O 05/22/24 1800   Y Connector Used Yes 05/22/24 1800   Drainage Description Serosanguinous 05/22/24 1800   Dressing Status Clean, dry &

## 2024-05-22 NOTE — ANESTHESIA PROCEDURE NOTES
Peripheral Block    Patient location during procedure: pre-op  Reason for block: post-op pain management  Start time: 5/22/2024 7:16 AM  End time: 5/22/2024 7:19 AM  Staffing  Performed: anesthesiologist   Anesthesiologist: Jae Elder MD  Performed by: Jae Elder MD  Authorized by: Jae Elder MD    Preanesthetic Checklist  Completed: patient identified, IV checked, site marked, risks and benefits discussed, surgical/procedural consents, equipment checked, pre-op evaluation, timeout performed, anesthesia consent given, oxygen available, monitors applied/VS acknowledged, fire risk safety assessment completed and verbalized and blood product R/B/A discussed and consented  Peripheral Block   Patient position: sitting  Prep: ChloraPrep  Provider prep: mask and sterile gloves  Patient monitoring: cardiac monitor, continuous pulse ox, frequent blood pressure checks, IV access and responsive to questions  Block type: Erector spinae  Laterality: right  Injection technique: single-shot  Guidance: ultrasound guided    Needle   Needle type: insulated echogenic nerve stimulator needle   Needle gauge: 22 G  Needle localization: ultrasound guidance  Needle insertion depth: 8 cm  Test dose: negative  Needle length: 8 cm  Assessment   Injection assessment: negative aspiration for heme, low pressure verified by pressure monitor, no intravascular symptoms, no paresthesia on injection and local visualized surrounding nerve on ultrasound  Paresthesia pain: none  Slow fractionated injection: yes  Hemodynamics: stable  Outcomes: uncomplicated    Medications Administered  ropivacaine (NAROPIN) injection 0.5% - Perineural   30 mL - 5/22/2024 7:16:00 AM

## 2024-05-22 NOTE — PROGRESS NOTES
05/22/24 1554   RT Protocol   History Pulmonary Disease 1   Respiratory pattern 0   Breath sounds 2   Cough 3   Bronchodilator Assessment Score 6

## 2024-05-22 NOTE — H&P
I have reviewed the H&P and  I have examined the patient and I find no changes.  After again reviewing both PET scan and CT-guided biopsy films we confirmed right lower lobe mildly PET positive nodule which has grown over the past year.  I once again reviewed the operative procedure with the patient .  I also once again reviewed the risks, benefits and alternatives, including the alternative of not doing the intended procedure, with the patient and her family consisting of her , daughter and mother.  All questions were answered and there are no active changes;  the patient wishes to proceed.Below is read documentation of initial evaluation:    Below is  documentation from initial evaluation:    Department of Cardiovascular & Thoracic Surgery  Consult Note           Reason for Consult: Right lower lobe lung nodule  Requesting Physician: Dr. Salazar     History Obtained From:  patient, mother     HISTORY OF PRESENT ILLNESS:               The patient is a 53 y.o. female active smoker who was found to have an incidental right lower lobe lung nodule when she underwent CT scan of the abdomen in December 2023 to evaluate peripheral edema.  Repeat CT scan in a 3-month intervals slight enlargement of the nodule from 1.2 x 1 cm to 1.5 x 1.2 cm.  PET scan was weakly positive and CT-guided needle biopsy shows abnormal tissue although unable to further differentiate because of crush artifact.  MRI was done today but I do not have the official report.  She denies any headache constitutional symptoms such as fatigue lack of energy loss of appetite unexplained weight loss or new lumps or nodules or aches or pains.  Pulmonary function tests are excellent with an FEV1 which is 74% of predicted or 2.05 FEV1 and a DLCO of 55% predicted.  Past Medical History:             Diagnosis Date    Active tobacco use 20-pack-year history quit 5 days ago       Hyperlipidemia       Prediabetes       Peripheral edema unknown etiology        to get in any case a echocardiogram as well as both venous and arterial Dopplers of the lower extremities pretreatment.

## 2024-05-22 NOTE — CONSENT
Informed Consent for Blood Component Transfusion Note    I have discussed with the patient the rationale for blood component transfusion; its benefits in treating or preventing fatigue, organ damage, or death; and its risk which includes mild transfusion reactions, rare risk of blood borne infection, or more serious but rare reactions. I have discussed the alternatives to transfusion, including the risk and consequences of not receiving transfusion. The patient had an opportunity to ask questions and had agreed to proceed with transfusion of blood components.  Very low risk of requirement of blood transfusion.  Electronically signed by Sonya Milan MD on 5/22/24 at 7:19 AM EDT

## 2024-05-22 NOTE — ANESTHESIA POSTPROCEDURE EVALUATION
Department of Anesthesiology  Postprocedure Note    Patient: Cici Hernández  MRN: [id number]  YOB: 1970  Date of evaluation: 5/22/2024    Procedure Summary       Date: 05/22/24 Room / Location: [address]    Anesthesia Start: 0734 Anesthesia Stop: 1401    Procedure: ROBOTIC ASSISTED RIGHT LOWER LOBE LOBECTOMY WITH MEDIASTINAL LYMPH NODE DISSECTION (Right: Chest) Diagnosis:       Lung nodule      (Lung nodule [R91.1])    Surgeons: Sonya Milan MD Responsible Provider: Jae Elder MD    Anesthesia Type: General, Regional ASA Status: 2            Anesthesia Type: General, Regional    Nik Phase I: Nik Score: 8    Nik Phase II:      Anesthesia Post Evaluation    Patient location during evaluation: PACU  Patient participation: complete - patient participated  Level of consciousness: awake and alert  Airway patency: patent  Nausea & Vomiting: no nausea and no vomiting  Cardiovascular status: blood pressure returned to baseline  Respiratory status: acceptable  Hydration status: euvolemic  Multimodal analgesia pain management approach  Pain management: adequate    No notable events documented.

## 2024-05-22 NOTE — ANESTHESIA PRE PROCEDURE
Once Jae Elder MD       • sodium chloride flush 0.9 % injection 5-40 mL  5-40 mL IntraVENous 2 times per day Jae Elder MD       • sodium chloride flush 0.9 % injection 5-40 mL  5-40 mL IntraVENous PRN Jae Elder MD       • 0.9 % sodium chloride infusion   IntraVENous PRN Jae Elder MD       • midazolam PF (VERSED) injection 2 mg  2 mg IntraVENous Once PRN Jae Elder MD       • midazolam (VERSED) 2 MG/2ML injection                Allergies:    Allergies   Allergen Reactions   • Naproxen Anaphylaxis, Hives and Shortness Of Breath       Problem List:    Patient Active Problem List   Diagnosis Code   • Lung nodule R91.1   • Lung mass R91.8       Past Medical History:        Diagnosis Date   • Edema     left leg   • Lung nodule     right   • Smoker        Past Surgical History:        Procedure Laterality Date   • CARPAL TUNNEL RELEASE Right    • CHOLECYSTECTOMY      with hiatal hernia rep   • CT NEEDLE BIOPSY LUNG PERCUTANEOUS  04/16/2024    CT NEEDLE BIOPSY LUNG PERCUTANEOUS 4/16/2024 [facility] CT SCAN   • NASAL FRACTURE SURGERY      closed reduction   • TUBAL LIGATION         Social History:    Social History     Tobacco Use   • Smoking status: Every Day     Average packs/day: 1 pack/day for 28.4 years (28.3 ttl pk-yrs)     Types: Cigarettes     Start date: 1996   • Smokeless tobacco: Never   • Tobacco comments:     Has not smoked since 4/24/24. Using Chantix.   Substance Use Topics   • Alcohol use: Yes     Comment: rare                                Ready to quit: Not Answered  Counseling given: Not Answered  Tobacco comments: Has not smoked since 4/24/24. Using Chantix.      Vital Signs (Current):   Vitals:    05/09/24 1153 05/22/24 0655   BP:  (!) 142/95   Pulse:  71   Resp:  15   Temp:  97.1 °F (36.2 °C)   TempSrc:  Temporal   SpO2:  93%   Weight: 110.2 kg (243 lb) 109.8 kg (242 lb)   Height: 1.651 m (5' 5\")                                               BP Readings from

## 2024-05-23 ENCOUNTER — APPOINTMENT (OUTPATIENT)
Dept: GENERAL RADIOLOGY | Age: 54
End: 2024-05-23
Attending: THORACIC SURGERY (CARDIOTHORACIC VASCULAR SURGERY)
Payer: COMMERCIAL

## 2024-05-23 LAB
ANION GAP SERPL CALCULATED.3IONS-SCNC: 11 MMOL/L (ref 3–16)
BASE EXCESS BLDA CALC-SCNC: -1.4 MMOL/L (ref -3–3)
BASOPHILS # BLD: 0 K/UL (ref 0–0.2)
BASOPHILS NFR BLD: 0.4 %
BLOOD BANK DISPENSE STATUS: NORMAL
BLOOD BANK PRODUCT CODE: NORMAL
BPU ID: NORMAL
BUN SERPL-MCNC: 15 MG/DL (ref 7–20)
CALCIUM SERPL-MCNC: 7.9 MG/DL (ref 8.3–10.6)
CHLORIDE SERPL-SCNC: 105 MMOL/L (ref 99–110)
CO2 BLDA-SCNC: 55.2 MMOL/L
CO2 SERPL-SCNC: 22 MMOL/L (ref 21–32)
COHGB MFR BLDA: 1.6 % (ref 0–1.5)
CREAT SERPL-MCNC: 0.6 MG/DL (ref 0.6–1.1)
DEPRECATED RDW RBC AUTO: 14.1 % (ref 12.4–15.4)
DESCRIPTION BLOOD BANK: NORMAL
EOSINOPHIL # BLD: 0 K/UL (ref 0–0.6)
EOSINOPHIL NFR BLD: 0 %
GFR SERPLBLD CREATININE-BSD FMLA CKD-EPI: >90 ML/MIN/{1.73_M2}
GLUCOSE SERPL-MCNC: 144 MG/DL (ref 70–99)
HCO3 BLDA-SCNC: 23.5 MMOL/L (ref 21–29)
HCT VFR BLD AUTO: 32.7 % (ref 36–48)
HGB BLD-MCNC: 11.1 G/DL (ref 12–16)
HGB BLDA-MCNC: 11.7 G/DL (ref 12–16)
LYMPHOCYTES # BLD: 0.6 K/UL (ref 1–5.1)
LYMPHOCYTES NFR BLD: 5.7 %
MCH RBC QN AUTO: 30.2 PG (ref 26–34)
MCHC RBC AUTO-ENTMCNC: 33.9 G/DL (ref 31–36)
MCV RBC AUTO: 89 FL (ref 80–100)
METHGB MFR BLDA: 0.3 %
MONOCYTES # BLD: 0.8 K/UL (ref 0–1.3)
MONOCYTES NFR BLD: 7.7 %
NEUTROPHILS # BLD: 8.7 K/UL (ref 1.7–7.7)
NEUTROPHILS NFR BLD: 86.2 %
O2 THERAPY: ABNORMAL
PCO2 BLDA: 39 MMHG (ref 35–45)
PH BLDA: 7.39 [PH] (ref 7.35–7.45)
PLATELET # BLD AUTO: 227 K/UL (ref 135–450)
PMV BLD AUTO: 8.1 FL (ref 5–10.5)
PO2 BLDA: 64.6 MMHG (ref 75–108)
POTASSIUM SERPL-SCNC: 4.1 MMOL/L (ref 3.5–5.1)
RBC # BLD AUTO: 3.68 M/UL (ref 4–5.2)
SAO2 % BLDA: 94.4 %
SODIUM SERPL-SCNC: 138 MMOL/L (ref 136–145)
WBC # BLD AUTO: 10.1 K/UL (ref 4–11)

## 2024-05-23 PROCEDURE — 94660 CPAP INITIATION&MGMT: CPT

## 2024-05-23 PROCEDURE — 80048 BASIC METABOLIC PNL TOTAL CA: CPT

## 2024-05-23 PROCEDURE — 6370000000 HC RX 637 (ALT 250 FOR IP): Performed by: THORACIC SURGERY (CARDIOTHORACIC VASCULAR SURGERY)

## 2024-05-23 PROCEDURE — 2000000000 HC ICU R&B

## 2024-05-23 PROCEDURE — 6370000000 HC RX 637 (ALT 250 FOR IP)

## 2024-05-23 PROCEDURE — 97116 GAIT TRAINING THERAPY: CPT

## 2024-05-23 PROCEDURE — 71045 X-RAY EXAM CHEST 1 VIEW: CPT

## 2024-05-23 PROCEDURE — 99024 POSTOP FOLLOW-UP VISIT: CPT

## 2024-05-23 PROCEDURE — 97161 PT EVAL LOW COMPLEX 20 MIN: CPT

## 2024-05-23 PROCEDURE — 2580000003 HC RX 258

## 2024-05-23 PROCEDURE — 94640 AIRWAY INHALATION TREATMENT: CPT

## 2024-05-23 PROCEDURE — 82803 BLOOD GASES ANY COMBINATION: CPT

## 2024-05-23 PROCEDURE — 97530 THERAPEUTIC ACTIVITIES: CPT

## 2024-05-23 PROCEDURE — 97165 OT EVAL LOW COMPLEX 30 MIN: CPT

## 2024-05-23 PROCEDURE — 85025 COMPLETE CBC W/AUTO DIFF WBC: CPT

## 2024-05-23 PROCEDURE — 6360000002 HC RX W HCPCS

## 2024-05-23 PROCEDURE — 2700000000 HC OXYGEN THERAPY PER DAY

## 2024-05-23 PROCEDURE — 97535 SELF CARE MNGMENT TRAINING: CPT

## 2024-05-23 PROCEDURE — 94761 N-INVAS EAR/PLS OXIMETRY MLT: CPT

## 2024-05-23 PROCEDURE — 6360000002 HC RX W HCPCS: Performed by: THORACIC SURGERY (CARDIOTHORACIC VASCULAR SURGERY)

## 2024-05-23 RX ORDER — IPRATROPIUM BROMIDE AND ALBUTEROL SULFATE 2.5; .5 MG/3ML; MG/3ML
1 SOLUTION RESPIRATORY (INHALATION)
Status: DISCONTINUED | OUTPATIENT
Start: 2024-05-23 | End: 2024-05-28

## 2024-05-23 RX ADMIN — FENTANYL CITRATE 50 MCG: 50 INJECTION INTRAMUSCULAR; INTRAVENOUS at 04:49

## 2024-05-23 RX ADMIN — SENNOSIDES AND DOCUSATE SODIUM 1 TABLET: 50; 8.6 TABLET ORAL at 20:00

## 2024-05-23 RX ADMIN — KETOROLAC TROMETHAMINE 30 MG: 30 INJECTION, SOLUTION INTRAMUSCULAR at 16:24

## 2024-05-23 RX ADMIN — GABAPENTIN 300 MG: 300 CAPSULE ORAL at 20:01

## 2024-05-23 RX ADMIN — GABAPENTIN 300 MG: 300 CAPSULE ORAL at 16:23

## 2024-05-23 RX ADMIN — ENOXAPARIN SODIUM 30 MG: 100 INJECTION SUBCUTANEOUS at 08:39

## 2024-05-23 RX ADMIN — KETOROLAC TROMETHAMINE 30 MG: 30 INJECTION, SOLUTION INTRAMUSCULAR at 20:00

## 2024-05-23 RX ADMIN — SENNOSIDES AND DOCUSATE SODIUM 1 TABLET: 50; 8.6 TABLET ORAL at 08:39

## 2024-05-23 RX ADMIN — KETOROLAC TROMETHAMINE 30 MG: 30 INJECTION, SOLUTION INTRAMUSCULAR at 11:36

## 2024-05-23 RX ADMIN — KETOROLAC TROMETHAMINE 30 MG: 30 INJECTION, SOLUTION INTRAMUSCULAR at 04:45

## 2024-05-23 RX ADMIN — ACETAMINOPHEN 1000 MG: 500 TABLET ORAL at 06:30

## 2024-05-23 RX ADMIN — TRAMADOL HYDROCHLORIDE 50 MG: 50 TABLET ORAL at 07:34

## 2024-05-23 RX ADMIN — ACETAMINOPHEN 1000 MG: 500 TABLET ORAL at 11:36

## 2024-05-23 RX ADMIN — GABAPENTIN 300 MG: 300 CAPSULE ORAL at 08:39

## 2024-05-23 RX ADMIN — ENOXAPARIN SODIUM 30 MG: 100 INJECTION SUBCUTANEOUS at 20:00

## 2024-05-23 RX ADMIN — ACETAMINOPHEN 1000 MG: 500 TABLET ORAL at 01:39

## 2024-05-23 RX ADMIN — POLYETHYLENE GLYCOL 3350 17 G: 17 POWDER, FOR SOLUTION ORAL at 08:39

## 2024-05-23 RX ADMIN — ACETAMINOPHEN 1000 MG: 500 TABLET ORAL at 17:23

## 2024-05-23 RX ADMIN — IPRATROPIUM BROMIDE AND ALBUTEROL SULFATE 1 DOSE: .5; 3 SOLUTION RESPIRATORY (INHALATION) at 08:50

## 2024-05-23 RX ADMIN — SODIUM CHLORIDE, PRESERVATIVE FREE 10 ML: 5 INJECTION INTRAVENOUS at 20:01

## 2024-05-23 RX ADMIN — TRAMADOL HYDROCHLORIDE 100 MG: 50 TABLET ORAL at 22:56

## 2024-05-23 RX ADMIN — TRAMADOL HYDROCHLORIDE 100 MG: 50 TABLET ORAL at 17:23

## 2024-05-23 RX ADMIN — IPRATROPIUM BROMIDE AND ALBUTEROL SULFATE 1 DOSE: .5; 3 SOLUTION RESPIRATORY (INHALATION) at 19:45

## 2024-05-23 ASSESSMENT — PAIN SCALES - GENERAL
PAINLEVEL_OUTOF10: 10
PAINLEVEL_OUTOF10: 5
PAINLEVEL_OUTOF10: 9
PAINLEVEL_OUTOF10: 0
PAINLEVEL_OUTOF10: 2
PAINLEVEL_OUTOF10: 5
PAINLEVEL_OUTOF10: 9
PAINLEVEL_OUTOF10: 8
PAINLEVEL_OUTOF10: 7
PAINLEVEL_OUTOF10: 4
PAINLEVEL_OUTOF10: 7
PAINLEVEL_OUTOF10: 7

## 2024-05-23 NOTE — PROGRESS NOTES
05/22/24 2522   Treatment   Treatment Type Vibratory mucous clearing therapy or intervention

## 2024-05-23 NOTE — PROGRESS NOTES
[facility] - Inpatient Rehabilitation Department   Phone: [phone]    Physical Therapy    [x] Initial Evaluation            [] Daily Treatment Note         [] Discharge Summary      Patient: Cici Hernández   : 1970   MRN: [id number]   Date of Service:  2024  Admitting Diagnosis: Lung nodule  Current Admission Summary:  The patient is a 53 y.o. female active smoker who was found to have an incidental right lower lobe lung nodule when she underwent CT scan of the abdomen in 2023 to evaluate peripheral edema.  Repeat CT scan in a 3-month intervals slight enlargement of the nodule from 1.2 x 1 cm to 1.5 x 1.2 cm.  PET scan was weakly positive and CT-guided needle biopsy shows abnormal tissue although unable to further differentiate because of crush artifact.  MRI was done today but I do not have the official report.  She denies any headache constitutional symptoms such as fatigue lack of energy loss of appetite unexplained weight loss or new lumps or nodules or aches or pains.  Pulmonary function tests are excellent with an FEV1 which is 74% of predicted or 2.05 FEV1 and a DLCO of 55% predicted.    Status post L lower lobe lobectomy   Past Medical History:  has a past medical history of Edema, Lung nodule, and Smoker.  Past Surgical History:  has a past surgical history that includes CT NEEDLE BIOPSY LUNG PERCUTANEOUS (2024); Tubal ligation; Cholecystectomy; Carpal tunnel release (Right); Nasal fracture surgery; and Lung surgery (Right, 2024).  Discharge Recommendations: Cici Hernández scored a 18/24 on the AM-PAC short mobility form. Current research shows that an AM-PAC score of 18 or greater is typically associated with a discharge to the patient's home setting. Based on the patient's AM-PAC score and their current functional mobility deficits, it is recommended that the patient have 2-3 sessions per week of Physical Therapy at d/c to increase the patient's  Co-signed and supervised by: Liya Marquez PT, DPT [id number]

## 2024-05-23 NOTE — PROGRESS NOTES
[facility] - Inpatient Rehabilitation Department   Phone: [phone]    Occupational Therapy    [x] Initial Evaluation            [] Daily Treatment Note         [] Discharge Summary      Patient: Cici Hernández   : 1970   MRN: [id number]   Date of Service:  2024    Admitting Diagnosis:  Lung nodule  Current Admission Summary: s/p RIGHT Bi- LOBECTOMY (LOWER AND MIDDLE) WITH MEDIASTINAL LYMPH NODE DISSECTION   Past Medical History:  has a past medical history of Edema, Lung nodule, and Smoker.  Past Surgical History:  has a past surgical history that includes CT NEEDLE BIOPSY LUNG PERCUTANEOUS (2024); Tubal ligation; Cholecystectomy; Carpal tunnel release (Right); Nasal fracture surgery; and Lung surgery (Right, 2024).    Discharge Recommendations: Cici Hernández scored a 18/24 on the AM-PAC ADL Inpatient form. Current research shows that an AM-PAC score of 18 or greater is typically associated with a discharge to the patient's home setting. Based on the patient's AM-PAC score, and their current ADL deficits, it is recommended that the patient have 2-3 sessions per week of Occupational Therapy at d/c to increase the patient's independence.  At this time, this patient demonstrates the endurance and safety to discharge home with home services (home vs OP services) and a follow up treatment frequency of 2-3x/wk.   Please see assessment section for further patient specific details.    If patient discharges prior to next session this note will serve as a discharge summary.  Please see below for the latest assessment towards goals.     HOME HEALTH CARE: LEVEL 1 STANDARD    - Initial home health evaluation to occur within 24-48 hours, in patient home   - Therapy to evaluate with goal of regaining prior level of functioning   - Therapy to evaluate if patient has Home Health Aide needs for personal care      DME Required For Discharge: DME to be determined pending patient  Mobility:  Bed mobility not completed on this date.  Comments:  Transfers:  Sit to stand transfer:stand by assistance  Stand to sit transfer: stand by assistance  Comments:  Functional Mobility  Functional Mobility Activity: 270' around unit, around room -- initially CGA with no device, provided with 4WW and able to complete with SBA. Requires portable suction for ambulation  Device Use: rollator (4WRW)  Required Assistance: stand by assistance  Balance:  Static Sitting Balance: good: independent with functional balance in unsupported position  Dynamic Sitting Balance: fair (+): maintains balance at SBA/supervision without use of UE support  Static Standing Balance: fair (+): maintains balance at SBA/supervision without use of UE support  Comments:    Other Therapeutic Interventions    Functional Outcomes  AM-PAC Inpatient Daily Activity Raw Score: 18                                    Cognition  WFL  Orientation:    alert and oriented x 4  Command Following:   WFL     Education  Barriers To Learning: none  Patient Education: patient educated on goals, OT role and benefits, plan of care, transfer training, discharge recommendations  Learning Assessment:  patient verbalizes understanding, would benefit from continued reinforcement    Assessment  Activity Tolerance: Tolerated well, on 2L/min throughout  Impairments Requiring Therapeutic Intervention: decreased functional mobility, decreased ADL status, decreased safety awareness, decreased endurance, decreased balance, decreased IADL  Prognosis: good  Clinical Assessment: The patient is a 53 y.o. female who presents below their baseline level of function due to above deficits, associated with Lung nodule. Typically, pt is IND. Currently, pt is requiring SBA for transfers and mobility, IND for grooming. Continued OT indicated in order to promote return to PLOF    Safety Interventions: patient left in chair, chair alarm in place, call light within reach, gait belt, and

## 2024-05-23 NOTE — PROGRESS NOTES
[facility] Cardiothoracic Progress Note:    Surgery: ROBOTIC ASSISTED RIGHT Bi- LOBECTOMY (LOWER AND MIDDLE) WITH MEDIASTINAL LYMPH NODE DISSECTION   Surgeon: Dr. Milan  POD #: 1    Subjective:   Resting in chair comfortably on 2 L O2. Endorses incisional pain.     Vital Signs: BP (!) 96/55   Pulse 88   Temp 98.4 °F (36.9 °C) (Bladder)   Resp 18   Ht 1.651 m (5' 5\")   Wt 107.7 kg (237 lb 7 oz)   SpO2 98%   BMI 39.51 kg/m²  O2 Flow Rate (L/min): 2 L/min     Admission Weight: Weight - Scale: 110.2 kg (243 lb)    5/22/24 109.8 kg Pre-op   5/23/24 107.7 kg    Intake/Output:   Intake/Output Summary (Last 24 hours) at 5/23/2024 1224  Last data filed at 5/23/2024 0937  Gross per 24 hour   Intake 2550.34 ml   Output 2210 ml   Net 340.34 ml        Chest tubes/Drains:   #1 Right Chest tube  600cc/24hrs, 260cc/12 hrs overnight   Intermittent airleak noted     LABORATORY DATA:    CBC:   Recent Labs     05/22/24  0645 05/22/24  1058 05/22/24  1415 05/23/24  0455   WBC 6.8  --  16.4* 10.1   HGB 14.0 12.9 13.1 11.1*   HCT 39.3  --  37.9 32.7*   MCV 86.5  --  87.7 89.0     --  247 227     BMP:   Recent Labs     05/22/24  0645 05/22/24  1415 05/23/24  0455    137 138   K 3.8 3.8 4.1    106 105   CO2 21 18* 22   BUN 14 13 15   CREATININE 0.7 0.7 0.6     MG:  No results for input(s): \"MG\" in the last 72 hours.   Cardiac Enzymes: No results for input(s): \"CKTOTAL\", \"CKMB\", \"CKMBINDEX\", \"TROPONINI\" in the last 72 hours.  PT/INR:   Recent Labs     05/22/24  0645   PROTIME 13.2   INR 0.98     APTT:   Recent Labs     05/22/24  0645   APTT 28.5       Physicial Exam:   General appearance: No acute distress, alert oriented.  Lungs: Clear to auscultation with diminished breath sounds on right side.  Heart: Sinus rhythm on monitor  Chest: symmetrical expansion with inspiration and expirations; sternum stable   Abdomen: +bowel sounds, non-tender   Wound/Incisions: Robotic port incisions

## 2024-05-24 ENCOUNTER — APPOINTMENT (OUTPATIENT)
Dept: GENERAL RADIOLOGY | Age: 54
End: 2024-05-24
Attending: THORACIC SURGERY (CARDIOTHORACIC VASCULAR SURGERY)
Payer: COMMERCIAL

## 2024-05-24 LAB
ANION GAP SERPL CALCULATED.3IONS-SCNC: 9 MMOL/L (ref 3–16)
BASOPHILS # BLD: 0.1 K/UL (ref 0–0.2)
BASOPHILS NFR BLD: 0.7 %
BUN SERPL-MCNC: 24 MG/DL (ref 7–20)
CALCIUM SERPL-MCNC: 8.7 MG/DL (ref 8.3–10.6)
CHLORIDE SERPL-SCNC: 105 MMOL/L (ref 99–110)
CO2 SERPL-SCNC: 26 MMOL/L (ref 21–32)
CREAT SERPL-MCNC: 0.9 MG/DL (ref 0.6–1.1)
DEPRECATED RDW RBC AUTO: 14.3 % (ref 12.4–15.4)
EOSINOPHIL # BLD: 0 K/UL (ref 0–0.6)
EOSINOPHIL NFR BLD: 0.4 %
GFR SERPLBLD CREATININE-BSD FMLA CKD-EPI: 75 ML/MIN/{1.73_M2}
GLUCOSE SERPL-MCNC: 122 MG/DL (ref 70–99)
HCT VFR BLD AUTO: 31.3 % (ref 36–48)
HGB BLD-MCNC: 10.7 G/DL (ref 12–16)
LYMPHOCYTES # BLD: 1.6 K/UL (ref 1–5.1)
LYMPHOCYTES NFR BLD: 15.2 %
MCH RBC QN AUTO: 30.6 PG (ref 26–34)
MCHC RBC AUTO-ENTMCNC: 34.3 G/DL (ref 31–36)
MCV RBC AUTO: 89.2 FL (ref 80–100)
MONOCYTES # BLD: 1 K/UL (ref 0–1.3)
MONOCYTES NFR BLD: 9 %
NEUTROPHILS # BLD: 8 K/UL (ref 1.7–7.7)
NEUTROPHILS NFR BLD: 74.7 %
PLATELET # BLD AUTO: 231 K/UL (ref 135–450)
PMV BLD AUTO: 8.2 FL (ref 5–10.5)
POTASSIUM SERPL-SCNC: 4 MMOL/L (ref 3.5–5.1)
RBC # BLD AUTO: 3.51 M/UL (ref 4–5.2)
SODIUM SERPL-SCNC: 140 MMOL/L (ref 136–145)
WBC # BLD AUTO: 10.7 K/UL (ref 4–11)

## 2024-05-24 PROCEDURE — 85025 COMPLETE CBC W/AUTO DIFF WBC: CPT

## 2024-05-24 PROCEDURE — 99024 POSTOP FOLLOW-UP VISIT: CPT

## 2024-05-24 PROCEDURE — 94761 N-INVAS EAR/PLS OXIMETRY MLT: CPT

## 2024-05-24 PROCEDURE — 6370000000 HC RX 637 (ALT 250 FOR IP): Performed by: THORACIC SURGERY (CARDIOTHORACIC VASCULAR SURGERY)

## 2024-05-24 PROCEDURE — 2700000000 HC OXYGEN THERAPY PER DAY

## 2024-05-24 PROCEDURE — 94660 CPAP INITIATION&MGMT: CPT

## 2024-05-24 PROCEDURE — 6360000002 HC RX W HCPCS

## 2024-05-24 PROCEDURE — 6370000000 HC RX 637 (ALT 250 FOR IP)

## 2024-05-24 PROCEDURE — 80048 BASIC METABOLIC PNL TOTAL CA: CPT

## 2024-05-24 PROCEDURE — 71045 X-RAY EXAM CHEST 1 VIEW: CPT

## 2024-05-24 PROCEDURE — 6360000002 HC RX W HCPCS: Performed by: THORACIC SURGERY (CARDIOTHORACIC VASCULAR SURGERY)

## 2024-05-24 PROCEDURE — 2580000003 HC RX 258

## 2024-05-24 PROCEDURE — 94640 AIRWAY INHALATION TREATMENT: CPT

## 2024-05-24 PROCEDURE — 2000000000 HC ICU R&B

## 2024-05-24 RX ORDER — LANOLIN ALCOHOL/MO/W.PET/CERES
3 CREAM (GRAM) TOPICAL NIGHTLY PRN
Status: DISCONTINUED | OUTPATIENT
Start: 2024-05-24 | End: 2024-05-31 | Stop reason: HOSPADM

## 2024-05-24 RX ADMIN — ENOXAPARIN SODIUM 30 MG: 100 INJECTION SUBCUTANEOUS at 19:49

## 2024-05-24 RX ADMIN — KETOROLAC TROMETHAMINE 30 MG: 30 INJECTION, SOLUTION INTRAMUSCULAR at 08:51

## 2024-05-24 RX ADMIN — IPRATROPIUM BROMIDE AND ALBUTEROL SULFATE 1 DOSE: .5; 3 SOLUTION RESPIRATORY (INHALATION) at 20:41

## 2024-05-24 RX ADMIN — ACETAMINOPHEN 1000 MG: 500 TABLET ORAL at 19:49

## 2024-05-24 RX ADMIN — ACETAMINOPHEN 1000 MG: 500 TABLET ORAL at 11:45

## 2024-05-24 RX ADMIN — KETOROLAC TROMETHAMINE 30 MG: 30 INJECTION, SOLUTION INTRAMUSCULAR at 21:10

## 2024-05-24 RX ADMIN — GABAPENTIN 300 MG: 300 CAPSULE ORAL at 19:49

## 2024-05-24 RX ADMIN — MELATONIN TAB 3 MG 3 MG: 3 TAB at 21:10

## 2024-05-24 RX ADMIN — GABAPENTIN 300 MG: 300 CAPSULE ORAL at 16:14

## 2024-05-24 RX ADMIN — ACETAMINOPHEN 1000 MG: 500 TABLET ORAL at 04:59

## 2024-05-24 RX ADMIN — POLYETHYLENE GLYCOL 3350 17 G: 17 POWDER, FOR SOLUTION ORAL at 08:51

## 2024-05-24 RX ADMIN — SENNOSIDES AND DOCUSATE SODIUM 1 TABLET: 50; 8.6 TABLET ORAL at 19:49

## 2024-05-24 RX ADMIN — SODIUM CHLORIDE, PRESERVATIVE FREE 10 ML: 5 INJECTION INTRAVENOUS at 19:50

## 2024-05-24 RX ADMIN — TRAMADOL HYDROCHLORIDE 100 MG: 50 TABLET ORAL at 04:59

## 2024-05-24 RX ADMIN — ENOXAPARIN SODIUM 30 MG: 100 INJECTION SUBCUTANEOUS at 08:51

## 2024-05-24 RX ADMIN — KETOROLAC TROMETHAMINE 30 MG: 30 INJECTION, SOLUTION INTRAMUSCULAR at 04:59

## 2024-05-24 RX ADMIN — SENNOSIDES AND DOCUSATE SODIUM 1 TABLET: 50; 8.6 TABLET ORAL at 08:52

## 2024-05-24 RX ADMIN — GABAPENTIN 300 MG: 300 CAPSULE ORAL at 08:51

## 2024-05-24 RX ADMIN — IPRATROPIUM BROMIDE AND ALBUTEROL SULFATE 1 DOSE: .5; 3 SOLUTION RESPIRATORY (INHALATION) at 08:17

## 2024-05-24 RX ADMIN — KETOROLAC TROMETHAMINE 30 MG: 30 INJECTION, SOLUTION INTRAMUSCULAR at 16:14

## 2024-05-24 RX ADMIN — TRAMADOL HYDROCHLORIDE 100 MG: 50 TABLET ORAL at 19:49

## 2024-05-24 RX ADMIN — TRAMADOL HYDROCHLORIDE 100 MG: 50 TABLET ORAL at 11:45

## 2024-05-24 ASSESSMENT — PAIN SCALES - GENERAL
PAINLEVEL_OUTOF10: 6
PAINLEVEL_OUTOF10: 8
PAINLEVEL_OUTOF10: 0
PAINLEVEL_OUTOF10: 7
PAINLEVEL_OUTOF10: 6
PAINLEVEL_OUTOF10: 7
PAINLEVEL_OUTOF10: 3

## 2024-05-24 NOTE — CARE COORDINATION
CM met with patient and she chose [facility] or [facility].     CM spoke to Monica with [facility] and do not go to pt's zip code.    CM called Jae with [facility] [phone] and he accepted patient. Please call on discharge.     CM updated arielle and patient.    Liyah Barfield RN, BSN  [phone]

## 2024-05-24 NOTE — PROGRESS NOTES
[facility] Cardiothoracic Progress Note:    Surgery: ROBOTIC ASSISTED RIGHT Bi- LOBECTOMY (LOWER AND MIDDLE) WITH MEDIASTINAL LYMPH NODE DISSECTION   Surgeon: Dr. Milan  POD #: 2    Subjective:   5/23: Resting in chair comfortably on 2 L O2. Endorses incisional pain.     5/24: patient sitting in chair currently receiving breathing treatment; continues to improve; trouble sleeping last night    Vital Signs: /66   Pulse 91   Temp 97.6 °F (36.4 °C) (Temporal)   Resp 18   Ht 1.651 m (5' 5\")   Wt 108.3 kg (238 lb 12.1 oz)   SpO2 96%   BMI 39.73 kg/m²  O2 Flow Rate (L/min): 2 L/min     Admission Weight: Weight - Scale: 110.2 kg (243 lb)    5/22/24 - 109.8 kg Pre-op   5/23/24 - 107.7 kg  5/24/24 -  108.3 kg    Intake/Output:   Intake/Output Summary (Last 24 hours) at 5/24/2024 1401  Last data filed at 5/24/2024 0600  Gross per 24 hour   Intake --   Output 1050 ml   Net -1050 ml        Chest tubes/Drains:   #1 R. Mid-axillary   600cc/24hrs, 350cc/12 hrs overnight   Intermittent airleak noted    LABORATORY DATA:    CBC:   Recent Labs     05/22/24  1415 05/23/24  0455 05/24/24  0505   WBC 16.4* 10.1 10.7   HGB 13.1 11.1* 10.7*   HCT 37.9 32.7* 31.3*   MCV 87.7 89.0 89.2    227 231     BMP:   Recent Labs     05/22/24  1415 05/23/24  0455 05/24/24  0505    138 140   K 3.8 4.1 4.0    105 105   CO2 18* 22 26   BUN 13 15 24*   CREATININE 0.7 0.6 0.9     MG:  No results for input(s): \"MG\" in the last 72 hours.   Cardiac Enzymes: No results for input(s): \"CKTOTAL\", \"CKMB\", \"CKMBINDEX\", \"TROPONINI\" in the last 72 hours.  PT/INR:   Recent Labs     05/22/24  0645   PROTIME 13.2   INR 0.98     APTT:   Recent Labs     05/22/24 0645   APTT 28.5       Physicial Exam:   General appearance: NAD  Lungs: CTAB; diminished right base   Heart: SR on monitor  Chest: symmetrical expansion with inspiration and expirations; sternum stable   Abdomen: +bowel sounds, non-tender

## 2024-05-24 NOTE — CARE COORDINATION
Case Management Assessment  Initial Evaluation    Date/Time of Evaluation: 5/24/2024 08:52 AM   Assessment Completed by: ALVARO JERRY RN    If patient is discharged prior to next notation, then this note serves as note for discharge by case management.    Patient Name: Cici Hernández                   YOB: 1970  Diagnosis: Lung nodule [R91.1]  Lung mass [R91.8]                   Date / Time: 5/22/2024  5:44 AM    Patient Admission Status: Inpatient   Readmission Risk (Low < 19, Mod (19-27), High > 27): Readmission Risk Score: 6.7    Current PCP: Sallie Becker MD  PCP verified by CM? Yes    Chart Reviewed: Yes      History Provided by: Patient  Patient Orientation: Alert and Oriented, Person, Place, Situation    Patient Cognition: Alert    Hospitalization in the last 30 days (Readmission):  No    If yes, Readmission Assessment in  Navigator will be completed.    Advance Directives:      Code Status: Full Code   Patient's Primary Decision Maker is: Legal Next of Kin      Discharge Planning:    Patient lives with: Spouse/Significant Other Type of Home: House (single level with 2 steps to enter)  Primary Care Giver: Self  Patient Support Systems include: Spouse/Significant Other, Family Members   Current Financial resources: Other (Comment)  Current community resources: None  Current services prior to admission: None            Current DME:              Type of Home Care services:  None    ADLS  Prior functional level: Independent in ADLs/IADLs  Current functional level: Other (see comment), Mobility (therapy recommending home care)    PT AM-PAC: 18 /24  OT AM-PAC: 18 /24    Family can provide assistance at DC: Yes  Would you like Case Management to discuss the discharge plan with any other family members/significant others, and if so, who? Yes (spouse Genaro if needed)  Plans to Return to Present Housing: Yes  Other Identified Issues/Barriers to RETURNING to current housing: none   Potential

## 2024-05-24 NOTE — DISCHARGE INSTR - COC
Continuity of Care Form    Patient Name: Cici Hernández   :  1970  MRN:  [id number]    Admit date:  2024  Discharge date:  ***    Code Status Order: Full Code   Advance Directives:     Admitting Physician:  Sonya Milan MD  PCP: Sallie Becker MD    Discharging Nurse: ***  Discharging Hospital Unit/Room#: CVU-2907/2907-01  Discharging Unit Phone Number: ***    Emergency Contact:   Extended Emergency Contact Information  Primary Emergency Contact: Genaro Hernández  Address: [address] [place] [address]  Home Phone: [phone]  Mobile Phone: [phone]  Relation: Spouse  Secondary Emergency Contact: Conchita Hernández  Address: [address] [place] [address]  Home Phone: [phone]  Mobile Phone: [phone]  Relation: Child    Past Surgical History:  Past Surgical History:   Procedure Laterality Date    CARPAL TUNNEL RELEASE Right     CHOLECYSTECTOMY      with hiatal hernia rep    CT NEEDLE BIOPSY LUNG PERCUTANEOUS  2024    CT NEEDLE BIOPSY LUNG PERCUTANEOUS 2024 [facility] CT SCAN    LUNG SURGERY Right 2024    ROBOTIC ASSISTED RIGHT LOWER LOBE LOBECTOMY WITH MEDIASTINAL LYMPH NODE DISSECTION performed by Sonya Milan MD at [facility] OR    NASAL FRACTURE SURGERY      closed reduction    TUBAL LIGATION         Immunization History:     There is no immunization history on file for this patient.    Active Problems:  Patient Active Problem List   Diagnosis Code    Lung nodule R91.1    Lung mass R91.8       Isolation/Infection:   Isolation            No Isolation          Patient Infection Status       None to display            Nurse Assessment:  Last Vital Signs: /66   Pulse 91   Temp 97.6 °F (36.4 °C) (Temporal)   Resp 18   Ht 1.651 m (5' 5\")   Wt 108.3 kg (238 lb 12.1 oz)   SpO2 96%   BMI 39.73 kg/m²     Last documented pain score (0-10 scale): Pain Level: 7  Last Weight:   Wt Readings from Last 1

## 2024-05-25 ENCOUNTER — APPOINTMENT (OUTPATIENT)
Dept: GENERAL RADIOLOGY | Age: 54
End: 2024-05-25
Attending: THORACIC SURGERY (CARDIOTHORACIC VASCULAR SURGERY)
Payer: COMMERCIAL

## 2024-05-25 LAB
ANION GAP SERPL CALCULATED.3IONS-SCNC: 8 MMOL/L (ref 3–16)
BASOPHILS # BLD: 0 K/UL (ref 0–0.2)
BASOPHILS NFR BLD: 0.5 %
BUN SERPL-MCNC: 26 MG/DL (ref 7–20)
CALCIUM SERPL-MCNC: 9 MG/DL (ref 8.3–10.6)
CHLORIDE SERPL-SCNC: 107 MMOL/L (ref 99–110)
CO2 SERPL-SCNC: 26 MMOL/L (ref 21–32)
CREAT SERPL-MCNC: 0.8 MG/DL (ref 0.6–1.1)
DEPRECATED RDW RBC AUTO: 14.6 % (ref 12.4–15.4)
EOSINOPHIL # BLD: 0.4 K/UL (ref 0–0.6)
EOSINOPHIL NFR BLD: 4.4 %
GFR SERPLBLD CREATININE-BSD FMLA CKD-EPI: 86 ML/MIN/{1.73_M2}
GLUCOSE SERPL-MCNC: 112 MG/DL (ref 70–99)
HCT VFR BLD AUTO: 29.4 % (ref 36–48)
HGB BLD-MCNC: 9.9 G/DL (ref 12–16)
LYMPHOCYTES # BLD: 1.6 K/UL (ref 1–5.1)
LYMPHOCYTES NFR BLD: 19.2 %
MCH RBC QN AUTO: 30.4 PG (ref 26–34)
MCHC RBC AUTO-ENTMCNC: 33.8 G/DL (ref 31–36)
MCV RBC AUTO: 89.9 FL (ref 80–100)
MONOCYTES # BLD: 0.9 K/UL (ref 0–1.3)
MONOCYTES NFR BLD: 10.5 %
NEUTROPHILS # BLD: 5.5 K/UL (ref 1.7–7.7)
NEUTROPHILS NFR BLD: 65.4 %
PLATELET # BLD AUTO: 214 K/UL (ref 135–450)
PMV BLD AUTO: 7.9 FL (ref 5–10.5)
POTASSIUM SERPL-SCNC: 4 MMOL/L (ref 3.5–5.1)
RBC # BLD AUTO: 3.27 M/UL (ref 4–5.2)
SODIUM SERPL-SCNC: 141 MMOL/L (ref 136–145)
WBC # BLD AUTO: 8.4 K/UL (ref 4–11)

## 2024-05-25 PROCEDURE — 6370000000 HC RX 637 (ALT 250 FOR IP): Performed by: THORACIC SURGERY (CARDIOTHORACIC VASCULAR SURGERY)

## 2024-05-25 PROCEDURE — 6370000000 HC RX 637 (ALT 250 FOR IP)

## 2024-05-25 PROCEDURE — 80048 BASIC METABOLIC PNL TOTAL CA: CPT

## 2024-05-25 PROCEDURE — 94640 AIRWAY INHALATION TREATMENT: CPT

## 2024-05-25 PROCEDURE — 2580000003 HC RX 258

## 2024-05-25 PROCEDURE — 94761 N-INVAS EAR/PLS OXIMETRY MLT: CPT

## 2024-05-25 PROCEDURE — 2000000000 HC ICU R&B

## 2024-05-25 PROCEDURE — 71045 X-RAY EXAM CHEST 1 VIEW: CPT

## 2024-05-25 PROCEDURE — 6360000002 HC RX W HCPCS

## 2024-05-25 PROCEDURE — 85025 COMPLETE CBC W/AUTO DIFF WBC: CPT

## 2024-05-25 PROCEDURE — 6360000002 HC RX W HCPCS: Performed by: THORACIC SURGERY (CARDIOTHORACIC VASCULAR SURGERY)

## 2024-05-25 PROCEDURE — 94660 CPAP INITIATION&MGMT: CPT

## 2024-05-25 RX ADMIN — POLYETHYLENE GLYCOL 3350 17 G: 17 POWDER, FOR SOLUTION ORAL at 08:28

## 2024-05-25 RX ADMIN — SENNOSIDES AND DOCUSATE SODIUM 1 TABLET: 50; 8.6 TABLET ORAL at 20:27

## 2024-05-25 RX ADMIN — KETOROLAC TROMETHAMINE 30 MG: 30 INJECTION, SOLUTION INTRAMUSCULAR at 08:28

## 2024-05-25 RX ADMIN — IPRATROPIUM BROMIDE AND ALBUTEROL SULFATE 1 DOSE: .5; 3 SOLUTION RESPIRATORY (INHALATION) at 08:26

## 2024-05-25 RX ADMIN — KETOROLAC TROMETHAMINE 30 MG: 30 INJECTION, SOLUTION INTRAMUSCULAR at 06:23

## 2024-05-25 RX ADMIN — KETOROLAC TROMETHAMINE 30 MG: 30 INJECTION, SOLUTION INTRAMUSCULAR at 21:56

## 2024-05-25 RX ADMIN — TRAMADOL HYDROCHLORIDE 100 MG: 50 TABLET ORAL at 01:34

## 2024-05-25 RX ADMIN — ACETAMINOPHEN 1000 MG: 500 TABLET ORAL at 14:48

## 2024-05-25 RX ADMIN — ACETAMINOPHEN 1000 MG: 500 TABLET ORAL at 01:34

## 2024-05-25 RX ADMIN — SENNOSIDES AND DOCUSATE SODIUM 1 TABLET: 50; 8.6 TABLET ORAL at 08:28

## 2024-05-25 RX ADMIN — TRAMADOL HYDROCHLORIDE 100 MG: 50 TABLET ORAL at 18:48

## 2024-05-25 RX ADMIN — SODIUM CHLORIDE, PRESERVATIVE FREE 10 ML: 5 INJECTION INTRAVENOUS at 08:28

## 2024-05-25 RX ADMIN — IPRATROPIUM BROMIDE AND ALBUTEROL SULFATE 1 DOSE: .5; 3 SOLUTION RESPIRATORY (INHALATION) at 20:10

## 2024-05-25 RX ADMIN — ENOXAPARIN SODIUM 30 MG: 100 INJECTION SUBCUTANEOUS at 08:28

## 2024-05-25 RX ADMIN — GABAPENTIN 300 MG: 300 CAPSULE ORAL at 08:28

## 2024-05-25 RX ADMIN — MELATONIN TAB 3 MG 3 MG: 3 TAB at 21:56

## 2024-05-25 RX ADMIN — GABAPENTIN 300 MG: 300 CAPSULE ORAL at 20:27

## 2024-05-25 RX ADMIN — ACETAMINOPHEN 1000 MG: 500 TABLET ORAL at 18:49

## 2024-05-25 RX ADMIN — ACETAMINOPHEN 1000 MG: 500 TABLET ORAL at 06:23

## 2024-05-25 RX ADMIN — SODIUM CHLORIDE, PRESERVATIVE FREE 10 ML: 5 INJECTION INTRAVENOUS at 20:27

## 2024-05-25 RX ADMIN — ENOXAPARIN SODIUM 30 MG: 100 INJECTION SUBCUTANEOUS at 20:27

## 2024-05-25 RX ADMIN — KETOROLAC TROMETHAMINE 30 MG: 30 INJECTION, SOLUTION INTRAMUSCULAR at 16:11

## 2024-05-25 RX ADMIN — GABAPENTIN 300 MG: 300 CAPSULE ORAL at 14:48

## 2024-05-25 ASSESSMENT — PAIN SCALES - GENERAL
PAINLEVEL_OUTOF10: 8
PAINLEVEL_OUTOF10: 7
PAINLEVEL_OUTOF10: 0
PAINLEVEL_OUTOF10: 4
PAINLEVEL_OUTOF10: 6
PAINLEVEL_OUTOF10: 7
PAINLEVEL_OUTOF10: 0
PAINLEVEL_OUTOF10: 4
PAINLEVEL_OUTOF10: 6
PAINLEVEL_OUTOF10: 7
PAINLEVEL_OUTOF10: 7

## 2024-05-25 ASSESSMENT — PAIN DESCRIPTION - PAIN TYPE: TYPE: SURGICAL PAIN

## 2024-05-25 NOTE — PROGRESS NOTES
CVTS Cardiothoracic Progress Note:    Surgery: ROBOTIC ASSISTED RIGHT Bi- LOBECTOMY (LOWER AND MIDDLE) WITH MEDIASTINAL LYMPH NODE DISSECTION   Surgeon: Dr. Milan  POD #: 3    Subjective:   Doing well  Pain control very good now  Ambulating in the hallway without difficulty  Vital Signs: BP 99/66   Pulse 79   Temp 97.4 °F (36.3 °C) (Temporal)   Resp 20   Ht 1.651 m (5' 5\")   Wt 109.5 kg (241 lb 6.5 oz)   SpO2 93%   BMI 40.17 kg/m²  O2 Flow Rate (L/min): 1 L/min     Admission Weight: Weight - Scale: 110.2 kg (243 lb)    5/22/24 - 109.8 kg Pre-op   5/23/24 - 107.7 kg  5/24/24 -  108.3 kg    Intake/Output:   Intake/Output Summary (Last 24 hours) at 5/25/2024 1313  Last data filed at 5/25/2024 0946  Gross per 24 hour   Intake --   Output 500 ml   Net -500 ml      Chest tubes/Drains:   #1 R.  Chest tube for 50 serosanguineous.  + Airleak but decreased compared with yesterday intermittent with tidaling  LABORATORY DATA:    CBC:   Recent Labs     05/23/24  0455 05/24/24  0505 05/25/24  0425   WBC 10.1 10.7 8.4   HGB 11.1* 10.7* 9.9*   HCT 32.7* 31.3* 29.4*   MCV 89.0 89.2 89.9    231 214       BMP:   Recent Labs     05/23/24  0455 05/24/24  0505 05/25/24  0425    140 141   K 4.1 4.0 4.0    105 107   CO2 22 26 26   BUN 15 24* 26*   CREATININE 0.6 0.9 0.8           Physicial Exam:   General appearance: NAD  Lungs: CTAB; diminished right base   Heart: SR on monitor  Chest: symmetrical expansion with inspiration and expirations; sternum stable   Abdomen: +bowel sounds, non-tender   Wound/Incisions:  Robotic port incisions C/D/I   Extremities: BLE pulses intact; trace edema bilaterally   Neurological: A/O x4      PMHx  S/P ROBOTIC ASSISTED RIGHT Bi- LOBECTOMY (LOWER AND MIDDLE) WITH MEDIASTINAL LYMPH NODE DISSECTION   Lung nodule Right lower lobe  Acute postop blood loss anemia   Tobacco Abuse    Meds:  VTE prophylaxis: Lovenox 30 mg sub-q BID  Pain management: Tylenol

## 2024-05-25 NOTE — RT PROTOCOL NOTE
RT Inhaler-Nebulizer Bronchodilator Protocol Note    There is a bronchodilator order in the chart from a provider indicating to follow the RT Bronchodilator Protocol and there is an “Initiate RT Inhaler-Nebulizer Bronchodilator Protocol” order as well (see protocol at bottom of note).    CXR Findings:  XR CHEST PORTABLE    Result Date: 5/24/2024  1. Stable positions of right-sided chest tubes, with slight interval improvement in appearance of a basilar right pneumothorax. 2. Stable bibasilar atelectasis.     XR CHEST PORTABLE    Result Date: 5/23/2024  Unchanged right basilar pneumothorax.       The findings from the last RT Protocol Assessment were as follows:   History Pulmonary Disease: Smoker 15 pack years or more  Respiratory Pattern: Regular pattern and RR 12-20 bpm  Breath Sounds: Inspiratory and expiratory or bilateral wheezing and/or rhonchi  Cough: Strong, spontaneous, non-productive  Indication for Bronchodilator Therapy:    Bronchodilator Assessment Score: 7    Aerosolized bronchodilator medication orders have been revised according to the RT Inhaler-Nebulizer Bronchodilator Protocol below.    Respiratory Therapist to perform RT Therapy Protocol Assessment initially then follow the protocol.  Repeat RT Therapy Protocol Assessment PRN for score 0-3 or on second treatment, BID, and PRN for scores above 3.    No Indications - adjust the frequency to every 6 hours PRN wheezing or bronchospasm, if no treatments needed after 48 hours then discontinue using Per Protocol order mode.     If indication present, adjust the RT bronchodilator orders based on the Bronchodilator Assessment Score as indicated below.  Use Inhaler orders unless patient has one or more of the following: on home nebulizer, not able to hold breath for 10 seconds, is not alert and oriented, cannot activate and use MDI correctly, or respiratory rate 25 breaths per minute or more, then use the equivalent nebulizer order(s) with same Frequency

## 2024-05-26 ENCOUNTER — APPOINTMENT (OUTPATIENT)
Dept: GENERAL RADIOLOGY | Age: 54
End: 2024-05-26
Attending: THORACIC SURGERY (CARDIOTHORACIC VASCULAR SURGERY)
Payer: COMMERCIAL

## 2024-05-26 LAB
BLOOD BANK DISPENSE STATUS: NORMAL
BLOOD BANK DISPENSE STATUS: NORMAL
BLOOD BANK PRODUCT CODE: NORMAL
BLOOD BANK PRODUCT CODE: NORMAL
BPU ID: NORMAL
BPU ID: NORMAL
DESCRIPTION BLOOD BANK: NORMAL
DESCRIPTION BLOOD BANK: NORMAL

## 2024-05-26 PROCEDURE — 2580000003 HC RX 258

## 2024-05-26 PROCEDURE — 6360000002 HC RX W HCPCS: Performed by: THORACIC SURGERY (CARDIOTHORACIC VASCULAR SURGERY)

## 2024-05-26 PROCEDURE — 6370000000 HC RX 637 (ALT 250 FOR IP): Performed by: THORACIC SURGERY (CARDIOTHORACIC VASCULAR SURGERY)

## 2024-05-26 PROCEDURE — 71045 X-RAY EXAM CHEST 1 VIEW: CPT

## 2024-05-26 PROCEDURE — 6360000002 HC RX W HCPCS

## 2024-05-26 PROCEDURE — 94761 N-INVAS EAR/PLS OXIMETRY MLT: CPT

## 2024-05-26 PROCEDURE — 6370000000 HC RX 637 (ALT 250 FOR IP)

## 2024-05-26 PROCEDURE — 2000000000 HC ICU R&B

## 2024-05-26 PROCEDURE — 94640 AIRWAY INHALATION TREATMENT: CPT

## 2024-05-26 RX ORDER — FUROSEMIDE 10 MG/ML
40 INJECTION INTRAMUSCULAR; INTRAVENOUS ONCE
Status: COMPLETED | OUTPATIENT
Start: 2024-05-26 | End: 2024-05-26

## 2024-05-26 RX ADMIN — ACETAMINOPHEN 1000 MG: 500 TABLET ORAL at 18:56

## 2024-05-26 RX ADMIN — IPRATROPIUM BROMIDE AND ALBUTEROL SULFATE 1 DOSE: .5; 3 SOLUTION RESPIRATORY (INHALATION) at 20:33

## 2024-05-26 RX ADMIN — SODIUM CHLORIDE, PRESERVATIVE FREE 10 ML: 5 INJECTION INTRAVENOUS at 08:00

## 2024-05-26 RX ADMIN — KETOROLAC TROMETHAMINE 30 MG: 30 INJECTION, SOLUTION INTRAMUSCULAR at 14:43

## 2024-05-26 RX ADMIN — KETOROLAC TROMETHAMINE 30 MG: 30 INJECTION, SOLUTION INTRAMUSCULAR at 10:53

## 2024-05-26 RX ADMIN — GABAPENTIN 300 MG: 300 CAPSULE ORAL at 07:57

## 2024-05-26 RX ADMIN — SODIUM CHLORIDE, PRESERVATIVE FREE 10 ML: 5 INJECTION INTRAVENOUS at 20:49

## 2024-05-26 RX ADMIN — KETOROLAC TROMETHAMINE 30 MG: 30 INJECTION, SOLUTION INTRAMUSCULAR at 06:43

## 2024-05-26 RX ADMIN — KETOROLAC TROMETHAMINE 30 MG: 30 INJECTION, SOLUTION INTRAMUSCULAR at 20:49

## 2024-05-26 RX ADMIN — MELATONIN TAB 3 MG 3 MG: 3 TAB at 20:51

## 2024-05-26 RX ADMIN — GABAPENTIN 300 MG: 300 CAPSULE ORAL at 14:43

## 2024-05-26 RX ADMIN — ACETAMINOPHEN 1000 MG: 500 TABLET ORAL at 07:57

## 2024-05-26 RX ADMIN — SENNOSIDES AND DOCUSATE SODIUM 1 TABLET: 50; 8.6 TABLET ORAL at 07:56

## 2024-05-26 RX ADMIN — IPRATROPIUM BROMIDE AND ALBUTEROL SULFATE 1 DOSE: .5; 3 SOLUTION RESPIRATORY (INHALATION) at 09:59

## 2024-05-26 RX ADMIN — GABAPENTIN 300 MG: 300 CAPSULE ORAL at 20:49

## 2024-05-26 RX ADMIN — ENOXAPARIN SODIUM 30 MG: 100 INJECTION SUBCUTANEOUS at 20:49

## 2024-05-26 RX ADMIN — ENOXAPARIN SODIUM 30 MG: 100 INJECTION SUBCUTANEOUS at 07:56

## 2024-05-26 RX ADMIN — ACETAMINOPHEN 1000 MG: 500 TABLET ORAL at 01:19

## 2024-05-26 RX ADMIN — TRAMADOL HYDROCHLORIDE 100 MG: 50 TABLET ORAL at 18:56

## 2024-05-26 RX ADMIN — FUROSEMIDE 40 MG: 10 INJECTION, SOLUTION INTRAMUSCULAR; INTRAVENOUS at 14:43

## 2024-05-26 RX ADMIN — SENNOSIDES AND DOCUSATE SODIUM 1 TABLET: 50; 8.6 TABLET ORAL at 20:49

## 2024-05-26 RX ADMIN — POLYETHYLENE GLYCOL 3350 17 G: 17 POWDER, FOR SOLUTION ORAL at 07:56

## 2024-05-26 ASSESSMENT — PAIN DESCRIPTION - ORIENTATION
ORIENTATION: RIGHT
ORIENTATION: UPPER
ORIENTATION: RIGHT

## 2024-05-26 ASSESSMENT — PAIN DESCRIPTION - LOCATION
LOCATION: RIB CAGE;CHEST
LOCATION: CHEST

## 2024-05-26 ASSESSMENT — PAIN SCALES - GENERAL
PAINLEVEL_OUTOF10: 7
PAINLEVEL_OUTOF10: 7
PAINLEVEL_OUTOF10: 6
PAINLEVEL_OUTOF10: 7
PAINLEVEL_OUTOF10: 0
PAINLEVEL_OUTOF10: 6
PAINLEVEL_OUTOF10: 7
PAINLEVEL_OUTOF10: 6
PAINLEVEL_OUTOF10: 4
PAINLEVEL_OUTOF10: 4

## 2024-05-26 ASSESSMENT — PAIN DESCRIPTION - DESCRIPTORS
DESCRIPTORS: DULL;ACHING
DESCRIPTORS: ACHING
DESCRIPTORS: ACHING
DESCRIPTORS: ACHING;SHARP
DESCRIPTORS: ACHING

## 2024-05-26 NOTE — PROGRESS NOTES
CVTS Cardiothoracic Progress Note:    Surgery: ROBOTIC ASSISTED RIGHT Bi- LOBECTOMY (LOWER AND MIDDLE) WITH MEDIASTINAL LYMPH NODE DISSECTION   Surgeon: Dr. Milan  POD #: 4    Subjective:   Doing well  Pain control very good now  Ambulating in the hallway without difficulty  Vital Signs: /78   Pulse 80   Temp 97.8 °F (36.6 °C) (Temporal)   Resp 18   Ht 1.651 m (5' 5\")   Wt 110.8 kg (244 lb 4.3 oz)   SpO2 96%   BMI 40.65 kg/m²  O2 Flow Rate (L/min): 0 L/min     Admission Weight: Weight - Scale: 110.2 kg (243 lb)    5/22/24 - 109.8 kg Pre-op   5/23/24 - 107.7 kg  5/24/24 -  108.3 kg    Intake/Output:   Intake/Output Summary (Last 24 hours) at 5/26/2024 1317  Last data filed at 5/26/2024 1056  Gross per 24 hour   Intake 840 ml   Output 340 ml   Net 500 ml      Chest tubes/Drains:   #1 R.  Chest tube 520.  Airleak continues to improve on a daily basis now only intermittent with coughing only    LABORATORY DATA:    CBC:   Recent Labs     05/24/24  0505 05/25/24  0425   WBC 10.7 8.4   HGB 10.7* 9.9*   HCT 31.3* 29.4*   MCV 89.2 89.9    214       BMP:   Recent Labs     05/24/24  0505 05/25/24  0425    141   K 4.0 4.0    107   CO2 26 26   BUN 24* 26*   CREATININE 0.9 0.8           Physicial Exam:   General appearance: NAD  Lungs: CTAB; diminished right base   Heart: SR on monitor  Chest: symmetrical expansion with inspiration and expirations; sternum stable   Abdomen: +bowel sounds, non-tender   Wound/Incisions:  Robotic port incisions C/D/I   Extremities: BLE pulses intact; trace edema bilaterally   Neurological: A/O x4      PMHx  S/P ROBOTIC ASSISTED RIGHT Bi- LOBECTOMY (LOWER AND MIDDLE) WITH MEDIASTINAL LYMPH NODE DISSECTION   Lung nodule Right lower lobe  Acute postop blood loss anemia   Tobacco Abuse    Meds:  VTE prophylaxis: Lovenox 30 mg sub-q BID  Pain management: Tylenol 1000 mg q 6hrs; alternate with IV toradol; Tramadol 50 mg q6hrs PRN; Gabapentin  300 mg TID   Glycolax 17 g daily  Senokot 8.6 - 5.0 mg BID    Continue chest tube to suction x 24 hours -anticipate waterseal tomorrow and removal after 24 hours if no change in chest x-ray   Give 1 dose of Lasix  pathology  pending.  Probably will not result until Tuesday  D/C Goals:   case management following for discharge planning

## 2024-05-27 ENCOUNTER — APPOINTMENT (OUTPATIENT)
Dept: GENERAL RADIOLOGY | Age: 54
End: 2024-05-27
Attending: THORACIC SURGERY (CARDIOTHORACIC VASCULAR SURGERY)
Payer: COMMERCIAL

## 2024-05-27 PROCEDURE — 6360000002 HC RX W HCPCS: Performed by: THORACIC SURGERY (CARDIOTHORACIC VASCULAR SURGERY)

## 2024-05-27 PROCEDURE — 2000000000 HC ICU R&B

## 2024-05-27 PROCEDURE — 2580000003 HC RX 258

## 2024-05-27 PROCEDURE — 94640 AIRWAY INHALATION TREATMENT: CPT

## 2024-05-27 PROCEDURE — 6360000002 HC RX W HCPCS

## 2024-05-27 PROCEDURE — 6370000000 HC RX 637 (ALT 250 FOR IP): Performed by: THORACIC SURGERY (CARDIOTHORACIC VASCULAR SURGERY)

## 2024-05-27 PROCEDURE — 94761 N-INVAS EAR/PLS OXIMETRY MLT: CPT

## 2024-05-27 PROCEDURE — 71045 X-RAY EXAM CHEST 1 VIEW: CPT

## 2024-05-27 PROCEDURE — 6370000000 HC RX 637 (ALT 250 FOR IP)

## 2024-05-27 RX ADMIN — POLYETHYLENE GLYCOL 3350 17 G: 17 POWDER, FOR SOLUTION ORAL at 08:57

## 2024-05-27 RX ADMIN — KETOROLAC TROMETHAMINE 30 MG: 30 INJECTION, SOLUTION INTRAMUSCULAR at 04:52

## 2024-05-27 RX ADMIN — TRAMADOL HYDROCHLORIDE 50 MG: 50 TABLET ORAL at 21:03

## 2024-05-27 RX ADMIN — KETOROLAC TROMETHAMINE 30 MG: 30 INJECTION, SOLUTION INTRAMUSCULAR at 08:57

## 2024-05-27 RX ADMIN — SODIUM CHLORIDE, PRESERVATIVE FREE 10 ML: 5 INJECTION INTRAVENOUS at 20:56

## 2024-05-27 RX ADMIN — TRAMADOL HYDROCHLORIDE 50 MG: 50 TABLET ORAL at 16:38

## 2024-05-27 RX ADMIN — IPRATROPIUM BROMIDE AND ALBUTEROL SULFATE 1 DOSE: .5; 3 SOLUTION RESPIRATORY (INHALATION) at 09:18

## 2024-05-27 RX ADMIN — ACETAMINOPHEN 1000 MG: 500 TABLET ORAL at 00:14

## 2024-05-27 RX ADMIN — ACETAMINOPHEN 1000 MG: 500 TABLET ORAL at 04:52

## 2024-05-27 RX ADMIN — GABAPENTIN 300 MG: 300 CAPSULE ORAL at 20:56

## 2024-05-27 RX ADMIN — ENOXAPARIN SODIUM 30 MG: 100 INJECTION SUBCUTANEOUS at 08:57

## 2024-05-27 RX ADMIN — NALOXEGOL OXALATE 25 MG: 25 TABLET, FILM COATED ORAL at 16:38

## 2024-05-27 RX ADMIN — TRAMADOL HYDROCHLORIDE 100 MG: 50 TABLET ORAL at 00:13

## 2024-05-27 RX ADMIN — KETOROLAC TROMETHAMINE 30 MG: 30 INJECTION, SOLUTION INTRAMUSCULAR at 18:03

## 2024-05-27 RX ADMIN — ACETAMINOPHEN 1000 MG: 500 TABLET ORAL at 16:40

## 2024-05-27 RX ADMIN — TRAMADOL HYDROCHLORIDE 100 MG: 50 TABLET ORAL at 06:02

## 2024-05-27 RX ADMIN — IPRATROPIUM BROMIDE AND ALBUTEROL SULFATE 1 DOSE: .5; 3 SOLUTION RESPIRATORY (INHALATION) at 20:29

## 2024-05-27 RX ADMIN — SODIUM CHLORIDE, PRESERVATIVE FREE 10 ML: 5 INJECTION INTRAVENOUS at 09:00

## 2024-05-27 RX ADMIN — ENOXAPARIN SODIUM 30 MG: 100 INJECTION SUBCUTANEOUS at 20:56

## 2024-05-27 RX ADMIN — SENNOSIDES AND DOCUSATE SODIUM 1 TABLET: 50; 8.6 TABLET ORAL at 08:57

## 2024-05-27 ASSESSMENT — PAIN DESCRIPTION - LOCATION
LOCATION: CHEST

## 2024-05-27 ASSESSMENT — PAIN DESCRIPTION - ORIENTATION
ORIENTATION: RIGHT
ORIENTATION: ANTERIOR;RIGHT;UPPER
ORIENTATION: RIGHT
ORIENTATION: RIGHT;UPPER
ORIENTATION: RIGHT
ORIENTATION: RIGHT;ANTERIOR;UPPER
ORIENTATION: RIGHT;ANTERIOR;UPPER
ORIENTATION: RIGHT;ANTERIOR
ORIENTATION: RIGHT

## 2024-05-27 ASSESSMENT — PAIN SCALES - GENERAL
PAINLEVEL_OUTOF10: 7
PAINLEVEL_OUTOF10: 6
PAINLEVEL_OUTOF10: 7
PAINLEVEL_OUTOF10: 5
PAINLEVEL_OUTOF10: 2
PAINLEVEL_OUTOF10: 7
PAINLEVEL_OUTOF10: 5
PAINLEVEL_OUTOF10: 0
PAINLEVEL_OUTOF10: 7
PAINLEVEL_OUTOF10: 0
PAINLEVEL_OUTOF10: 9
PAINLEVEL_OUTOF10: 5
PAINLEVEL_OUTOF10: 7
PAINLEVEL_OUTOF10: 8
PAINLEVEL_OUTOF10: 0

## 2024-05-27 ASSESSMENT — PAIN DESCRIPTION - PAIN TYPE
TYPE: SURGICAL PAIN
TYPE: SURGICAL PAIN

## 2024-05-27 ASSESSMENT — PAIN DESCRIPTION - DESCRIPTORS
DESCRIPTORS: SORE
DESCRIPTORS: ACHING

## 2024-05-27 ASSESSMENT — PAIN - FUNCTIONAL ASSESSMENT
PAIN_FUNCTIONAL_ASSESSMENT: PREVENTS OR INTERFERES SOME ACTIVE ACTIVITIES AND ADLS
PAIN_FUNCTIONAL_ASSESSMENT: PREVENTS OR INTERFERES SOME ACTIVE ACTIVITIES AND ADLS

## 2024-05-27 ASSESSMENT — PAIN DESCRIPTION - ONSET: ONSET: GRADUAL

## 2024-05-27 NOTE — PROGRESS NOTES
53-year-old female status post robotic right middle and lower lobectomy doing well.      Patient was seen and examined this morning without any acute events. Patient hemodynamics were stable.  Patient was afebrile with stable hemodynamics.  Labs and chest x-ray reviewed reviewed.      Chest tube to water seal CXR. Continue diuresis, pain control, bowel regimen, ambulate and medical management per CT surgery protocol.

## 2024-05-28 ENCOUNTER — APPOINTMENT (OUTPATIENT)
Dept: GENERAL RADIOLOGY | Age: 54
End: 2024-05-28
Attending: THORACIC SURGERY (CARDIOTHORACIC VASCULAR SURGERY)
Payer: COMMERCIAL

## 2024-05-28 PROCEDURE — 6370000000 HC RX 637 (ALT 250 FOR IP)

## 2024-05-28 PROCEDURE — 71045 X-RAY EXAM CHEST 1 VIEW: CPT

## 2024-05-28 PROCEDURE — 6370000000 HC RX 637 (ALT 250 FOR IP): Performed by: NURSE PRACTITIONER

## 2024-05-28 PROCEDURE — 6370000000 HC RX 637 (ALT 250 FOR IP): Performed by: THORACIC SURGERY (CARDIOTHORACIC VASCULAR SURGERY)

## 2024-05-28 PROCEDURE — 2580000003 HC RX 258

## 2024-05-28 PROCEDURE — 94761 N-INVAS EAR/PLS OXIMETRY MLT: CPT

## 2024-05-28 PROCEDURE — 6360000002 HC RX W HCPCS

## 2024-05-28 PROCEDURE — 2000000000 HC ICU R&B

## 2024-05-28 PROCEDURE — 94640 AIRWAY INHALATION TREATMENT: CPT

## 2024-05-28 RX ORDER — FUROSEMIDE 40 MG/1
40 TABLET ORAL 2 TIMES DAILY
Status: DISCONTINUED | OUTPATIENT
Start: 2024-05-28 | End: 2024-05-31

## 2024-05-28 RX ORDER — IPRATROPIUM BROMIDE AND ALBUTEROL SULFATE 2.5; .5 MG/3ML; MG/3ML
1 SOLUTION RESPIRATORY (INHALATION)
Status: DISCONTINUED | OUTPATIENT
Start: 2024-05-29 | End: 2024-05-31 | Stop reason: HOSPADM

## 2024-05-28 RX ORDER — IPRATROPIUM BROMIDE AND ALBUTEROL SULFATE 2.5; .5 MG/3ML; MG/3ML
1 SOLUTION RESPIRATORY (INHALATION) EVERY 4 HOURS PRN
Status: DISCONTINUED | OUTPATIENT
Start: 2024-05-28 | End: 2024-05-31 | Stop reason: HOSPADM

## 2024-05-28 RX ORDER — POTASSIUM CHLORIDE 20 MEQ/1
40 TABLET, EXTENDED RELEASE ORAL 2 TIMES DAILY
Status: DISCONTINUED | OUTPATIENT
Start: 2024-05-28 | End: 2024-05-31

## 2024-05-28 RX ADMIN — TRAMADOL HYDROCHLORIDE 100 MG: 50 TABLET ORAL at 19:26

## 2024-05-28 RX ADMIN — POLYETHYLENE GLYCOL 3350 17 G: 17 POWDER, FOR SOLUTION ORAL at 08:18

## 2024-05-28 RX ADMIN — ACETAMINOPHEN 1000 MG: 500 TABLET ORAL at 00:16

## 2024-05-28 RX ADMIN — FUROSEMIDE 40 MG: 40 TABLET ORAL at 18:19

## 2024-05-28 RX ADMIN — ENOXAPARIN SODIUM 30 MG: 100 INJECTION SUBCUTANEOUS at 20:30

## 2024-05-28 RX ADMIN — ACETAMINOPHEN 1000 MG: 500 TABLET ORAL at 12:12

## 2024-05-28 RX ADMIN — POTASSIUM CHLORIDE 40 MEQ: 1500 TABLET, EXTENDED RELEASE ORAL at 12:12

## 2024-05-28 RX ADMIN — SODIUM CHLORIDE, PRESERVATIVE FREE 10 ML: 5 INJECTION INTRAVENOUS at 08:18

## 2024-05-28 RX ADMIN — GABAPENTIN 300 MG: 300 CAPSULE ORAL at 15:01

## 2024-05-28 RX ADMIN — GABAPENTIN 300 MG: 300 CAPSULE ORAL at 08:18

## 2024-05-28 RX ADMIN — FUROSEMIDE 40 MG: 40 TABLET ORAL at 12:12

## 2024-05-28 RX ADMIN — GABAPENTIN 300 MG: 300 CAPSULE ORAL at 20:30

## 2024-05-28 RX ADMIN — IPRATROPIUM BROMIDE AND ALBUTEROL SULFATE 1 DOSE: .5; 3 SOLUTION RESPIRATORY (INHALATION) at 21:07

## 2024-05-28 RX ADMIN — NALOXEGOL OXALATE 25 MG: 25 TABLET, FILM COATED ORAL at 08:18

## 2024-05-28 RX ADMIN — POTASSIUM CHLORIDE 40 MEQ: 1500 TABLET, EXTENDED RELEASE ORAL at 20:30

## 2024-05-28 RX ADMIN — SENNOSIDES AND DOCUSATE SODIUM 1 TABLET: 50; 8.6 TABLET ORAL at 08:18

## 2024-05-28 RX ADMIN — ACETAMINOPHEN 1000 MG: 500 TABLET ORAL at 18:19

## 2024-05-28 RX ADMIN — IPRATROPIUM BROMIDE AND ALBUTEROL SULFATE 1 DOSE: .5; 3 SOLUTION RESPIRATORY (INHALATION) at 07:46

## 2024-05-28 RX ADMIN — ENOXAPARIN SODIUM 30 MG: 100 INJECTION SUBCUTANEOUS at 08:18

## 2024-05-28 RX ADMIN — ACETAMINOPHEN 1000 MG: 500 TABLET ORAL at 06:05

## 2024-05-28 RX ADMIN — SODIUM CHLORIDE, PRESERVATIVE FREE 10 ML: 5 INJECTION INTRAVENOUS at 20:31

## 2024-05-28 ASSESSMENT — PAIN DESCRIPTION - LOCATION
LOCATION: CHEST

## 2024-05-28 ASSESSMENT — PAIN DESCRIPTION - ONSET
ONSET: SUDDEN
ONSET: ON-GOING

## 2024-05-28 ASSESSMENT — PAIN SCALES - GENERAL
PAINLEVEL_OUTOF10: 5
PAINLEVEL_OUTOF10: 1
PAINLEVEL_OUTOF10: 3
PAINLEVEL_OUTOF10: 10
PAINLEVEL_OUTOF10: 5
PAINLEVEL_OUTOF10: 2

## 2024-05-28 ASSESSMENT — PAIN DESCRIPTION - ORIENTATION
ORIENTATION: RIGHT
ORIENTATION: RIGHT
ORIENTATION: MID
ORIENTATION: RIGHT
ORIENTATION: MID

## 2024-05-28 ASSESSMENT — PAIN - FUNCTIONAL ASSESSMENT
PAIN_FUNCTIONAL_ASSESSMENT: ACTIVITIES ARE NOT PREVENTED
PAIN_FUNCTIONAL_ASSESSMENT: ACTIVITIES ARE NOT PREVENTED

## 2024-05-28 ASSESSMENT — PAIN DESCRIPTION - FREQUENCY
FREQUENCY: INTERMITTENT
FREQUENCY: INTERMITTENT

## 2024-05-28 ASSESSMENT — PAIN DESCRIPTION - PAIN TYPE
TYPE: ACUTE PAIN
TYPE: ACUTE PAIN

## 2024-05-28 ASSESSMENT — PAIN SCALES - WONG BAKER: WONGBAKER_NUMERICALRESPONSE: NO HURT

## 2024-05-28 ASSESSMENT — PAIN DESCRIPTION - DESCRIPTORS
DESCRIPTORS: DISCOMFORT
DESCRIPTORS: SHARP

## 2024-05-28 NOTE — PROGRESS NOTES
Occupational Therapy  Cici Hernández      Attempted to see pt for OT tx. Pt reports that she is independent with ADLs, is toileting on her own, is independent with ambulation and does not feel she needs further OT. Pt agreeable to d/c from OT services at this time. AVELINA Sin Ed., OTR/L, [id number]

## 2024-05-28 NOTE — RT PROTOCOL NOTE
RT Inhaler-Nebulizer Bronchodilator Protocol Note    There is a bronchodilator order in the chart from a provider indicating to follow the RT Bronchodilator Protocol and there is an “Initiate RT Inhaler-Nebulizer Bronchodilator Protocol” order as well (see protocol at bottom of note).    CXR Findings:  XR CHEST PORTABLE    Result Date: 5/27/2024  1. Postsurgical changes status post right lower lobectomy, with multiple stable right-sided chest tubes, and no current evidence of a pneumothorax. 2. Slight interval improvement in appearance of bibasilar atelectasis.     XR CHEST PORTABLE    Result Date: 5/26/2024  1. Postsurgical changes status post right lower lobectomy, with multiple right-sided chest tubes in place, and no current evidence of a pneumothorax. 2. Stable bibasilar atelectasis.       The findings from the last RT Protocol Assessment were as follows:   History Pulmonary Disease: Smoker 15 pack years or more  Respiratory Pattern: Regular pattern and RR 12-20 bpm  Breath Sounds: Slightly diminished and/or crackles  Cough: Strong, productive  Indication for Bronchodilator Therapy:    Bronchodilator Assessment Score: 4    Aerosolized bronchodilator medication orders have been revised according to the RT Inhaler-Nebulizer Bronchodilator Protocol below.    Respiratory Therapist to perform RT Therapy Protocol Assessment initially then follow the protocol.  Repeat RT Therapy Protocol Assessment PRN for score 0-3 or on second treatment, BID, and PRN for scores above 3.    No Indications - adjust the frequency to every 6 hours PRN wheezing or bronchospasm, if no treatments needed after 48 hours then discontinue using Per Protocol order mode.     If indication present, adjust the RT bronchodilator orders based on the Bronchodilator Assessment Score as indicated below.  Use Inhaler orders unless patient has one or more of the following: on home nebulizer, not able to hold breath for 10 seconds, is not alert and  oriented, cannot activate and use MDI correctly, or respiratory rate 25 breaths per minute or more, then use the equivalent nebulizer order(s) with same Frequency and PRN reasons based on the score.  If a patient is on this medication at home then do not decrease Frequency below that used at home.    0-3 - enter or revise RT bronchodilator order(s) to equivalent RT Bronchodilator order with Frequency of every 4 hours PRN for wheezing or increased work of breathing using Per Protocol order mode.        4-6 - enter or revise RT Bronchodilator order(s) to two equivalent RT bronchodilator orders with one order with BID Frequency and one order with Frequency of every 4 hours PRN wheezing or increased work of breathing using Per Protocol order mode.        7-10 - enter or revise RT Bronchodilator order(s) to two equivalent RT bronchodilator orders with one order with TID Frequency and one order with Frequency of every 4 hours PRN wheezing or increased work of breathing using Per Protocol order mode.       11-13 - enter or revise RT Bronchodilator order(s) to one equivalent RT bronchodilator order with QID Frequency and an Albuterol order with Frequency of every 4 hours PRN wheezing or increased work of breathing using Per Protocol order mode.      Greater than 13 - enter or revise RT Bronchodilator order(s) to one equivalent RT bronchodilator order with every 4 hours Frequency and an Albuterol order with Frequency of every 2 hours PRN wheezing or increased work of breathing using Per Protocol order mode.     RT to enter RT Home Evaluation for COPD & MDI Assessment order using Per Protocol order mode.    Electronically signed by Marcia Syde RCP on 5/28/2024 at 2:18 AM

## 2024-05-28 NOTE — PROGRESS NOTES
CVTS Cardiothoracic Progress Note:    Surgery: ROBOTIC ASSISTED RIGHT Bi- LOBECTOMY (LOWER AND MIDDLE) WITH MEDIASTINAL LYMPH NODE DISSECTION   Surgeon: Dr. Milan  POD #: 6    Subjective:   Patient seen and examined this a.m. resting comfortably in bedside chair.  Patient is complaining of some mild soreness at the chest tube insertion site.  She is denying any trouble breathing, chest pain.    Vital Signs: BP (!) 150/87   Pulse 93   Temp 97.8 °F (36.6 °C) (Temporal)   Resp 19   Ht 1.651 m (5' 5\")   Wt 113.3 kg (249 lb 12.5 oz)   SpO2 95%   BMI 41.57 kg/m²  O2 Flow Rate (L/min): 0 L/min     Admission Weight: Weight - Scale: 110.2 kg (243 lb)    5/22/24 - 109.8 kg Pre-op   5/23/24 - 107.7 kg  5/24/24 - 108.3 kg  5/28/24-  113.3 kg    Intake/Output:   Intake/Output Summary (Last 24 hours) at 5/28/2024 1304  Last data filed at 5/28/2024 1214  Gross per 24 hour   Intake 1520 ml   Output 580 ml   Net 940 ml    Chest tubes/Drains:   #1 R.  Chest tube 330 mL output over last 24 hours, 170 mL overnight    LABORATORY DATA:    CBC:   No results for input(s): \"WBC\", \"HGB\", \"HCT\", \"MCV\", \"PLT\" in the last 72 hours.    BMP:   No results for input(s): \"NA\", \"K\", \"CL\", \"CO2\", \"PHOS\", \"BUN\", \"CREATININE\" in the last 72 hours.    Invalid input(s): \"CA\"        Physicial Exam:   General appearance: No acute distress  Lungs: Clear bilateral, diminished right base  Heart: Sinus rhythm  Chest: Equal rise and fall of chest  Abdomen: Soft, nontender  Wound/Incisions: Insertion site CDI  Extremities: Pulses intact, baseline lymphedema noted  Neurological: Alert and orient x 4      PMHx  S/P ROBOTIC ASSISTED RIGHT Bi- LOBECTOMY (LOWER AND MIDDLE) WITH MEDIASTINAL LYMPH NODE DISSECTION   Lung nodule Right lower lobe  Acute postop blood loss anemia   Tobacco Abuse    Meds:  VTE prophylaxis: Lovenox 30 mg sub-q BID  Pain management: Tylenol 1000 mg q 6hrs; alternate with IV toradol; Tramadol 50 mg q6hrs  PRN; Gabapentin 300 mg TID   Glycolax 17 g daily  Senokot 8.6 - 5.0 mg BID  Movantik 25 mg daily    Lasix 40 mg p.o.  Potassium 40 mg twice daily p.o.  Leave chest tube to waterseal-currently has small air leak  Continue pulmonary toilet  Ambulate      D/C Goals:   case management following for discharge planning      Murali Ghotra, ELADIO - CNP

## 2024-05-29 ENCOUNTER — APPOINTMENT (OUTPATIENT)
Dept: GENERAL RADIOLOGY | Age: 54
End: 2024-05-29
Attending: THORACIC SURGERY (CARDIOTHORACIC VASCULAR SURGERY)
Payer: COMMERCIAL

## 2024-05-29 PROBLEM — D3A.8: Status: ACTIVE | Noted: 2024-05-29

## 2024-05-29 PROBLEM — E66.09 CLASS 2 OBESITY DUE TO EXCESS CALORIES WITH BODY MASS INDEX (BMI) OF 39.0 TO 39.9 IN ADULT: Status: ACTIVE | Noted: 2024-05-29

## 2024-05-29 PROBLEM — B39.2 PULMONARY HISTOPLASMOSIS GRANULOMA (HCC): Status: ACTIVE | Noted: 2024-05-29

## 2024-05-29 PROBLEM — E66.812 CLASS 2 OBESITY DUE TO EXCESS CALORIES WITH BODY MASS INDEX (BMI) OF 39.0 TO 39.9 IN ADULT: Status: ACTIVE | Noted: 2024-05-29

## 2024-05-29 PROBLEM — I51.89 DIASTOLIC DYSFUNCTION: Status: ACTIVE | Noted: 2024-05-29

## 2024-05-29 PROBLEM — Z87.891 EX-HEAVY CIGARETTE SMOKER (20-39 PER DAY): Status: ACTIVE | Noted: 2024-05-29

## 2024-05-29 PROCEDURE — 6370000000 HC RX 637 (ALT 250 FOR IP): Performed by: THORACIC SURGERY (CARDIOTHORACIC VASCULAR SURGERY)

## 2024-05-29 PROCEDURE — 6370000000 HC RX 637 (ALT 250 FOR IP)

## 2024-05-29 PROCEDURE — 94640 AIRWAY INHALATION TREATMENT: CPT

## 2024-05-29 PROCEDURE — 94761 N-INVAS EAR/PLS OXIMETRY MLT: CPT

## 2024-05-29 PROCEDURE — 82784 ASSAY IGA/IGD/IGG/IGM EACH: CPT

## 2024-05-29 PROCEDURE — 6370000000 HC RX 637 (ALT 250 FOR IP): Performed by: STUDENT IN AN ORGANIZED HEALTH CARE EDUCATION/TRAINING PROGRAM

## 2024-05-29 PROCEDURE — 99223 1ST HOSP IP/OBS HIGH 75: CPT | Performed by: INTERNAL MEDICINE

## 2024-05-29 PROCEDURE — 2580000003 HC RX 258

## 2024-05-29 PROCEDURE — 87385 HISTOPLASMA CAPSUL AG IA: CPT

## 2024-05-29 PROCEDURE — 86701 HIV-1ANTIBODY: CPT

## 2024-05-29 PROCEDURE — 86360 T CELL ABSOLUTE COUNT/RATIO: CPT

## 2024-05-29 PROCEDURE — 87390 HIV-1 AG IA: CPT

## 2024-05-29 PROCEDURE — 87103 BLOOD FUNGUS CULTURE: CPT

## 2024-05-29 PROCEDURE — 2000000000 HC ICU R&B

## 2024-05-29 PROCEDURE — 6370000000 HC RX 637 (ALT 250 FOR IP): Performed by: NURSE PRACTITIONER

## 2024-05-29 PROCEDURE — 86702 HIV-2 ANTIBODY: CPT

## 2024-05-29 PROCEDURE — 6360000002 HC RX W HCPCS

## 2024-05-29 PROCEDURE — 36415 COLL VENOUS BLD VENIPUNCTURE: CPT

## 2024-05-29 PROCEDURE — 71045 X-RAY EXAM CHEST 1 VIEW: CPT

## 2024-05-29 RX ORDER — GABAPENTIN 300 MG/1
300 CAPSULE ORAL 2 TIMES DAILY
Status: DISCONTINUED | OUTPATIENT
Start: 2024-05-29 | End: 2024-05-31

## 2024-05-29 RX ADMIN — SODIUM CHLORIDE, PRESERVATIVE FREE 10 ML: 5 INJECTION INTRAVENOUS at 19:46

## 2024-05-29 RX ADMIN — IPRATROPIUM BROMIDE AND ALBUTEROL SULFATE 1 DOSE: 2.5; .5 SOLUTION RESPIRATORY (INHALATION) at 13:22

## 2024-05-29 RX ADMIN — FUROSEMIDE 40 MG: 40 TABLET ORAL at 18:15

## 2024-05-29 RX ADMIN — ACETAMINOPHEN 1000 MG: 500 TABLET ORAL at 06:01

## 2024-05-29 RX ADMIN — GABAPENTIN 300 MG: 300 CAPSULE ORAL at 19:45

## 2024-05-29 RX ADMIN — TRAMADOL HYDROCHLORIDE 100 MG: 50 TABLET ORAL at 08:55

## 2024-05-29 RX ADMIN — ACETAMINOPHEN 1000 MG: 500 TABLET ORAL at 00:28

## 2024-05-29 RX ADMIN — GABAPENTIN 300 MG: 300 CAPSULE ORAL at 08:50

## 2024-05-29 RX ADMIN — ENOXAPARIN SODIUM 30 MG: 100 INJECTION SUBCUTANEOUS at 08:49

## 2024-05-29 RX ADMIN — ENOXAPARIN SODIUM 30 MG: 100 INJECTION SUBCUTANEOUS at 19:45

## 2024-05-29 RX ADMIN — IPRATROPIUM BROMIDE AND ALBUTEROL SULFATE 1 DOSE: 2.5; .5 SOLUTION RESPIRATORY (INHALATION) at 08:13

## 2024-05-29 RX ADMIN — POTASSIUM CHLORIDE 40 MEQ: 1500 TABLET, EXTENDED RELEASE ORAL at 19:45

## 2024-05-29 RX ADMIN — IPRATROPIUM BROMIDE AND ALBUTEROL SULFATE 1 DOSE: 2.5; .5 SOLUTION RESPIRATORY (INHALATION) at 19:41

## 2024-05-29 RX ADMIN — FUROSEMIDE 40 MG: 40 TABLET ORAL at 08:50

## 2024-05-29 RX ADMIN — NALOXEGOL OXALATE 25 MG: 25 TABLET, FILM COATED ORAL at 06:01

## 2024-05-29 RX ADMIN — POTASSIUM CHLORIDE 40 MEQ: 1500 TABLET, EXTENDED RELEASE ORAL at 08:50

## 2024-05-29 RX ADMIN — TRAMADOL HYDROCHLORIDE 100 MG: 50 TABLET ORAL at 18:15

## 2024-05-29 RX ADMIN — ACETAMINOPHEN 1000 MG: 500 TABLET ORAL at 18:15

## 2024-05-29 ASSESSMENT — ENCOUNTER SYMPTOMS
EYE REDNESS: 0
RHINORRHEA: 0
SORE THROAT: 0
BACK PAIN: 0
DIARRHEA: 0
ABDOMINAL PAIN: 0
SINUS PRESSURE: 0
EYE DISCHARGE: 0
SINUS PAIN: 0
NAUSEA: 0
WHEEZING: 0
CONSTIPATION: 0
SHORTNESS OF BREATH: 0
COUGH: 0

## 2024-05-29 ASSESSMENT — PAIN DESCRIPTION - LOCATION
LOCATION: CHEST;NECK;BACK
LOCATION: CHEST

## 2024-05-29 ASSESSMENT — PAIN SCALES - GENERAL
PAINLEVEL_OUTOF10: 6
PAINLEVEL_OUTOF10: 2
PAINLEVEL_OUTOF10: 6
PAINLEVEL_OUTOF10: 6
PAINLEVEL_OUTOF10: 2
PAINLEVEL_OUTOF10: 4
PAINLEVEL_OUTOF10: 2
PAINLEVEL_OUTOF10: 0

## 2024-05-29 ASSESSMENT — PAIN DESCRIPTION - PAIN TYPE
TYPE: ACUTE PAIN
TYPE: ACUTE PAIN

## 2024-05-29 ASSESSMENT — PAIN DESCRIPTION - ORIENTATION
ORIENTATION: RIGHT

## 2024-05-29 ASSESSMENT — PAIN DESCRIPTION - DESCRIPTORS
DESCRIPTORS: STABBING
DESCRIPTORS: SHARP;STABBING

## 2024-05-29 ASSESSMENT — PAIN DESCRIPTION - ONSET
ONSET: ON-GOING
ONSET: ON-GOING

## 2024-05-29 ASSESSMENT — PAIN DESCRIPTION - FREQUENCY
FREQUENCY: INTERMITTENT
FREQUENCY: INTERMITTENT

## 2024-05-29 NOTE — PROGRESS NOTES
CVTS Cardiothoracic Progress Note:    Surgery: ROBOTIC ASSISTED RIGHT Bi- LOBECTOMY (LOWER AND MIDDLE) WITH MEDIASTINAL LYMPH NODE DISSECTION   Surgeon: Dr. Milan  POD #: 7    Subjective:   Patient seen and examined this a.m. resting comfortably in bedside chair.  Patient reports that she feels good, slept good overnight still has some slight soreness at chest tube insertion sites.  She is denying trouble breathing, shortness of breath or chest pain.  She still has a slight air leak in her atrium.    Vital Signs: BP (!) 137/98   Pulse 85   Temp 98.6 °F (37 °C) (Temporal)   Resp 18   Ht 1.651 m (5' 5\")   Wt 109 kg (240 lb 4.8 oz)   SpO2 96%   BMI 39.99 kg/m²  O2 Flow Rate (L/min): 0 L/min     Admission Weight: Weight - Scale: 110.2 kg (243 lb)    5/22/24 - 109.8 kg Pre-op   5/23/24 - 107.7 kg  5/24/24 - 108.3 kg  5/28/24-  113.3 kg  5/29/24-  109 kg    Intake/Output:   Intake/Output Summary (Last 24 hours) at 5/29/2024 1023  Last data filed at 5/29/2024 0604  Gross per 24 hour   Intake 480 ml   Output 210 ml   Net 270 ml    Chest tubes/Drains:   #1 R.  Chest tube 290 mL output over last 24 hours, 170 mL overnight    LABORATORY DATA:    CBC:   No results for input(s): \"WBC\", \"HGB\", \"HCT\", \"MCV\", \"PLT\" in the last 72 hours.    BMP:   No results for input(s): \"NA\", \"K\", \"CL\", \"CO2\", \"PHOS\", \"BUN\", \"CREATININE\" in the last 72 hours.    Invalid input(s): \"CA\"      Physicial Exam:   General appearance: No acute distress, well-nourished  Lungs: Clear bilaterally, diminished in the right  Heart: Sinus rhythm  Chest: Equal rise and fall of chest  Abdomen: Soft, nontender  Wound/Incisions: Insertion site CDI  Extremities: Pulses intact, baseline lymphedema noted, left greater than right  Neurological: Alert and orient x 4      PMHx  S/P ROBOTIC ASSISTED RIGHT Bi- LOBECTOMY (LOWER AND MIDDLE) WITH MEDIASTINAL LYMPH NODE DISSECTION   Lung nodule Right lower lobe  Acute postop blood loss

## 2024-05-29 NOTE — CARE COORDINATION
Chart reviewed for discharge planning.     Pt still has chest tube.     OT saw patient and pt is independent and no services needed at discharge.    PT needs to re evaluate.    Pt has hc services set up with [facility] if any services needed on discharge.     CM will follow for any other discharge needs.    Liyah Barfield RN, BSN  [phone]

## 2024-05-29 NOTE — CONSULTS
PULMONARY AND CRITICAL CARE CONSULTATION NOTE    CONSULTING PHYSICIAN:      REASON FOR CONSULT: No chief complaint on file.      DATE OF CONSULT: 5/29/2024    HISTORY OF PRESENT ILLNESS: 53 y.o. year old female with past medical history of emphysema, former smoker, right lower lobe lung nodule positive for atypical cells who underwent right lower lobe/right middle lobe lobectomy with mediastinal lymph node dissection on 5/22.  Pathology findings suggestive of typical carcinoid, neuroendocrine tumor grade 1.  4R lymph node positive for carcinoma.  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  Pulmonary was consulted to discuss biopsy findings.    REVIEW OF SYSTEMS:   CONSTITUTIONAL SYMPTOMS: The patient denies fever, fatigue, night sweats, weight loss or weight gain.   HEENT: No vision changes. No tinnitus, Denies sinus pain. No hoarseness, or dysphagia.   NECK: Patient denies swelling in the neck.   CARDIOVASCULAR: Denies chest pain, palpitation, syncope.  RESPIRATORY: Denies shortness of breath or cough.   GASTROINTESTINAL: Denies nausea, abdominal pain or change in bowel function.  GENITOURINARY: Denies obstructive symptoms. No history of incontinence.  BREASTS: No masses or lumps in the breasts.   SKIN: No rashes or itching.   MUSCULOSKELETAL: Denies weakness or bone pain.   NEUROLOGICAL: No headaches or seizures.   PSYCHIATRIC: Denies mood swings or depression.   ENDOCRINE: Denies heat or cold intolerance or excessive thirst.  HEMATOLOGIC/LYMPHATIC: Denies easy bruising or lymph node swelling.  ALLERGIC/IMMUNOLOGIC: No environmental allergies.    PAST MEDICAL HISTORY:   Past Medical History:   Diagnosis Date    Edema     left leg    Lung nodule     right    Smoker        PAST SURGICAL HISTORY:   Past Surgical History:   Procedure Laterality Date    CARPAL TUNNEL RELEASE Right     CHOLECYSTECTOMY      with hiatal hernia rep    CT NEEDLE BIOPSY LUNG PERCUTANEOUS

## 2024-05-29 NOTE — PROGRESS NOTES
Nutrition Note    RECOMMENDATIONS  PO Diet: Continue current diet  Nutrition Support: Ordered Ensure HP once daily      NUTRITION ASSESSMENT   Pt triggered for LOS assessment. S/p robotic assisted right bi-lobectomy with mediastinal lymph node dissection 5/22. On regular diet with documented meal intakes of % throughout admission. However, upon visiting reported eating about half of meals with somewhat decreased po intake prior to current admission d/t missing teeth and taste changes since having COVID. Wt hx in EMR indicates no significant wt changes. Would like to receive ONS. RD will order and monitor for continued adequate po intake.     Nutrition Related Findings: LBM 5/28 per pt. Edema: non-pitting generalized, RUE, facial; +2 pitting BLE.  Wounds: Surgical Incision  Nutrition Education:  Education not indicated   Nutrition Goals: PO intake 75% or greater, by next RD assessment     MALNUTRITION ASSESSMENT   Malnutrition Status: No malnutrition    NUTRITION DIAGNOSIS   No nutrition diagnosis at this time    CURRENT NUTRITION THERAPIES  ADULT DIET; Regular     PO Intake: %   PO Supplement Intake:None Ordered    ANTHROPOMETRICS  Current Height: 165.1 cm (5' 5\")  Current Weight - Scale: 109 kg (240 lb 4.8 oz)    Ideal Body Weight (IBW): 125 lbs  (57 kg)      BMI: 40    The patient will be monitored per nutrition standards of care. Consult dietitian if additional nutrition interventions are needed prior to RD reassessment.     Elena Zacarias MS, RD, LD    Contact: [phone]

## 2024-05-29 NOTE — CONSULTS
Infectious Diseases   Consult Note        Admission Date: 5/22/2024  Hospital Day: Hospital Day: 8   Attending: Sonya Milan MD  Date of service: 5/29/24     Reason for admission: Lung nodule [R91.1]  Lung mass [R91.8]    Chief complaint/ Reason for consult: Concern for histoplasmosis    Microbiology:        I have reviewed allavailable micro lab data and cultures          Antibiotics and immunizations:       Current antibiotics: All antibiotics and their doses were reviewed by me    Recent Abx Admin        No antibiotic orders with administrations found.                      Immunization History: All immunization history was reviewed by me today.      There is no immunization history on file for this patient.    Known drug allergies:     All allergies were reviewed and updated    Allergies   Allergen Reactions    Naproxen Anaphylaxis, Hives and Shortness Of Breath       Social history:     Social History:  All social andepidemiologic history was reviewed and updated by me today as needed.     Tobacco use:   reports that she has been smoking cigarettes. She started smoking about 28 years ago. She has a 28.3 pack-year smoking history. She has never used smokeless tobacco.  Alcohol use:   reports current alcohol use.  Currently lives in: [place] [id number]   reports no history of drug use.     COVID VACCINATION AND LAB RESULT RECORDS:     Internal Administration   First Dose      Second Dose           Last COVID Lab POC Chloride (mmol/L)   Date Value   05/22/2024 112 (H)     POC Glucose (mg/dl)   Date Value   05/22/2024 164 (H)     POC Hematocrit (%)   Date Value   05/22/2024 38.0     POC Potassium (mmol/L)   Date Value   05/22/2024 4.4     Sample Type (no units)   Date Value   05/22/2024 ART     POC Sodium (mmol/L)   Date Value   05/22/2024 141            Assessment:     The patient is a 53 y.o. old female who  has a past medical history of Edema, Lung nodule, and Smoker. with following problems:    Concern for  [phone]  In-person Clinic days:  Tuesday & Thursday a.m.  Virtual clinic days: Monday, Wednesday & Friday a.m.

## 2024-05-30 ENCOUNTER — APPOINTMENT (OUTPATIENT)
Dept: GENERAL RADIOLOGY | Age: 54
End: 2024-05-30
Attending: THORACIC SURGERY (CARDIOTHORACIC VASCULAR SURGERY)
Payer: COMMERCIAL

## 2024-05-30 PROBLEM — Z71.6 TOBACCO ABUSE COUNSELING: Status: ACTIVE | Noted: 2024-05-30

## 2024-05-30 LAB
IGA SERPL-MCNC: 211 MG/DL (ref 70–400)
IGG SERPL-MCNC: 929 MG/DL (ref 700–1600)
IGM SERPL-MCNC: 44 MG/DL (ref 40–230)

## 2024-05-30 PROCEDURE — 6370000000 HC RX 637 (ALT 250 FOR IP): Performed by: STUDENT IN AN ORGANIZED HEALTH CARE EDUCATION/TRAINING PROGRAM

## 2024-05-30 PROCEDURE — 6370000000 HC RX 637 (ALT 250 FOR IP): Performed by: THORACIC SURGERY (CARDIOTHORACIC VASCULAR SURGERY)

## 2024-05-30 PROCEDURE — 6370000000 HC RX 637 (ALT 250 FOR IP)

## 2024-05-30 PROCEDURE — 94761 N-INVAS EAR/PLS OXIMETRY MLT: CPT

## 2024-05-30 PROCEDURE — 6360000002 HC RX W HCPCS

## 2024-05-30 PROCEDURE — 6370000000 HC RX 637 (ALT 250 FOR IP): Performed by: NURSE PRACTITIONER

## 2024-05-30 PROCEDURE — 2000000000 HC ICU R&B

## 2024-05-30 PROCEDURE — 71045 X-RAY EXAM CHEST 1 VIEW: CPT

## 2024-05-30 PROCEDURE — 71046 X-RAY EXAM CHEST 2 VIEWS: CPT

## 2024-05-30 PROCEDURE — 99233 SBSQ HOSP IP/OBS HIGH 50: CPT | Performed by: INTERNAL MEDICINE

## 2024-05-30 PROCEDURE — 2580000003 HC RX 258

## 2024-05-30 PROCEDURE — 94640 AIRWAY INHALATION TREATMENT: CPT

## 2024-05-30 RX ADMIN — IPRATROPIUM BROMIDE AND ALBUTEROL SULFATE 1 DOSE: 2.5; .5 SOLUTION RESPIRATORY (INHALATION) at 07:46

## 2024-05-30 RX ADMIN — GABAPENTIN 300 MG: 300 CAPSULE ORAL at 08:57

## 2024-05-30 RX ADMIN — GABAPENTIN 300 MG: 300 CAPSULE ORAL at 19:49

## 2024-05-30 RX ADMIN — ACETAMINOPHEN 1000 MG: 500 TABLET ORAL at 00:38

## 2024-05-30 RX ADMIN — IPRATROPIUM BROMIDE AND ALBUTEROL SULFATE 1 DOSE: 2.5; .5 SOLUTION RESPIRATORY (INHALATION) at 13:28

## 2024-05-30 RX ADMIN — FUROSEMIDE 40 MG: 40 TABLET ORAL at 16:01

## 2024-05-30 RX ADMIN — POTASSIUM CHLORIDE 40 MEQ: 1500 TABLET, EXTENDED RELEASE ORAL at 08:57

## 2024-05-30 RX ADMIN — IPRATROPIUM BROMIDE AND ALBUTEROL SULFATE 1 DOSE: 2.5; .5 SOLUTION RESPIRATORY (INHALATION) at 21:17

## 2024-05-30 RX ADMIN — SODIUM CHLORIDE, PRESERVATIVE FREE 10 ML: 5 INJECTION INTRAVENOUS at 08:58

## 2024-05-30 RX ADMIN — FUROSEMIDE 40 MG: 40 TABLET ORAL at 08:57

## 2024-05-30 RX ADMIN — POTASSIUM CHLORIDE 40 MEQ: 1500 TABLET, EXTENDED RELEASE ORAL at 19:49

## 2024-05-30 RX ADMIN — TRAMADOL HYDROCHLORIDE 100 MG: 50 TABLET ORAL at 16:00

## 2024-05-30 RX ADMIN — ENOXAPARIN SODIUM 30 MG: 100 INJECTION SUBCUTANEOUS at 19:50

## 2024-05-30 RX ADMIN — ENOXAPARIN SODIUM 30 MG: 100 INJECTION SUBCUTANEOUS at 08:57

## 2024-05-30 RX ADMIN — ACETAMINOPHEN 1000 MG: 500 TABLET ORAL at 18:23

## 2024-05-30 RX ADMIN — SODIUM CHLORIDE, PRESERVATIVE FREE 10 ML: 5 INJECTION INTRAVENOUS at 19:50

## 2024-05-30 RX ADMIN — ACETAMINOPHEN 1000 MG: 500 TABLET ORAL at 05:53

## 2024-05-30 ASSESSMENT — ENCOUNTER SYMPTOMS
NAUSEA: 0
SHORTNESS OF BREATH: 0
RHINORRHEA: 0
BACK PAIN: 0
ABDOMINAL PAIN: 0
CONSTIPATION: 0
SINUS PRESSURE: 0
EYE DISCHARGE: 0
SINUS PAIN: 0
DIARRHEA: 0
WHEEZING: 0
COUGH: 0
SORE THROAT: 0
EYE REDNESS: 0

## 2024-05-30 ASSESSMENT — PAIN DESCRIPTION - ORIENTATION: ORIENTATION: RIGHT

## 2024-05-30 ASSESSMENT — PAIN SCALES - GENERAL
PAINLEVEL_OUTOF10: 4
PAINLEVEL_OUTOF10: 7
PAINLEVEL_OUTOF10: 3

## 2024-05-30 ASSESSMENT — PAIN DESCRIPTION - LOCATION: LOCATION: CHEST

## 2024-05-30 NOTE — PROGRESS NOTES
Site cleansed and prepped per protocol.  Right posterior CT removed without difficulty.  Dry sterile dressing and vaseline gauze applied.  Bilateral breath sounds audible.  O2Sats 97% on RA. Incision site within normal limits. Patient tolerated well.

## 2024-05-30 NOTE — PROGRESS NOTES
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  Necrotizing granulomatous involving subcarinal lymph node, level 4R lymph node and level 7 lymph node  S/p robotic assisted right lower and middle lobe lobectomy with mediastinal lymph node dissection surgery on 05/22/2024 -surgical pathology was positive for neuroendocrine tumor grade 1 with positive 4 R lymph node for carcinoid    Microbiology:        I have reviewed allavailable micro lab data and cultures    Fungal blood culture culture  - collected on 5/29/2024: In process      Antibiotics and immunizations:       Current antibiotics: All antibiotics and their doses were reviewed by me    Recent Abx Admin        No antibiotic orders with administrations found.                      Immunization History: All immunization history was reviewed by me today.      There is no immunization history on file for this patient.    Known drug allergies:     All allergies were reviewed and updated    Allergies   Allergen Reactions    Naproxen Anaphylaxis, Hives and Shortness Of Breath       Social history:     Social History:  All social andepidemiologic history was reviewed and updated by me today as needed.     Tobacco use:   reports that she has been smoking cigarettes. She started smoking about 28 years ago. She has a 28.3 pack-year smoking history. She has never used smokeless tobacco.  Alcohol use:   reports current alcohol use.  Currently lives in: [place] [address]   reports no history of drug use.     COVID VACCINATION AND LAB RESULT RECORDS:     Internal Administration   First Dose       1. Postsurgical changes status post right lower lobectomy, with multiple   right-sided chest tubes in place, and no current evidence of a pneumothorax.   2. Stable bibasilar atelectasis.         XR CHEST PORTABLE   Final Result   No acute process.      Right chest tube remains in place, no pneumothorax         XR CHEST PORTABLE   Final Result   1. Stable positions of right-sided chest tubes, with slight interval   improvement in appearance of a basilar right pneumothorax.   2. Stable bibasilar atelectasis.         XR CHEST PORTABLE   Final Result   Unchanged right basilar pneumothorax.         XR CHEST PORTABLE   Final Result   Questionable loculated basilar pneumothorax.         XR CHEST PORTABLE    (Results Pending)   XR CHEST PORTABLE    (Results Pending)       Medications: All current and past medications were reviewed.     gabapentin  300 mg Oral BID    furosemide  40 mg Oral BID    potassium chloride  40 mEq Oral BID    ipratropium 0.5 mg-albuterol 2.5 mg  1 Dose Inhalation TID RT    sodium chloride flush  5-40 mL IntraVENous 2 times per day    polyethylene glycol  17 g Oral Daily    sennosides-docusate sodium  1 tablet Oral BID    enoxaparin  30 mg SubCUTAneous BID    acetaminophen  1,000 mg Oral 4 times per day        sodium chloride         ipratropium 0.5 mg-albuterol 2.5 mg, melatonin, sodium chloride flush, sodium chloride, traMADol **OR** traMADol, ondansetron **OR** ondansetron      Problem list:       Patient Active Problem List   Diagnosis Code    Lung nodule R91.1    Lung mass R91.8    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 Although every effort was made to ensure the

## 2024-05-30 NOTE — PROGRESS NOTES
[facility] Cardiothoracic Progress Note:    Surgery: ROBOTIC ASSISTED RIGHT Bi- LOBECTOMY (LOWER AND MIDDLE) WITH MEDIASTINAL LYMPH NODE DISSECTION   Surgeon: Dr. Milan  POD #: 8    Subjective:   Patient seen and examined this a.m. resting comfortably in bedside chair.  Patient is denying any chest pain, shortness of breath or trouble breathing.  She reports her soreness at chest tube site is about the same as previous day.  She continues to have a small air leak in her anterior chest tube, no air leak in her posterior chest tube.    Vital Signs: /80   Pulse 94   Temp 97.9 °F (36.6 °C) (Temporal)   Resp 18   Ht 1.651 m (5' 5\")   Wt 107.4 kg (236 lb 12.4 oz)   SpO2 95%   BMI 39.40 kg/m²  O2 Flow Rate (L/min): 0 L/min     Admission Weight: Weight - Scale: 110.2 kg (243 lb)    5/22/24 - 109.8 kg Pre-op   5/23/24 - 107.7 kg  5/24/24 - 108.3 kg  5/28/24-  113.3 kg  5/29/24-  109 kg  5/30/24-   107.4 kg    Intake/Output:   Intake/Output Summary (Last 24 hours) at 5/30/2024 1026  Last data filed at 5/30/2024 0800  Gross per 24 hour   Intake --   Output 180 ml   Net -180 ml      Chest tubes/Drains:   #1 R.  Anterior chest tube 45 mL last 24 hours with 35 mL overnight  #2 R posterior chest tube 100 mL / 24 over 65 mL overnight    LABORATORY DATA:    CBC:   No results for input(s): \"WBC\", \"HGB\", \"HCT\", \"MCV\", \"PLT\" in the last 72 hours.    BMP:   No results for input(s): \"NA\", \"K\", \"CL\", \"CO2\", \"PHOS\", \"BUN\", \"CREATININE\" in the last 72 hours.    Invalid input(s): \"CA\"      Physicial Exam:   General appearance: No acute distress, well-nourished  Lungs: Clear bilaterally with diminished breath sounds in the right  Heart: Sinus rhythm  Chest: Equal rise and fall of chest  Abdomen: Soft, nontender, nondistended  Wound/Incisions: Incision site CDI  Extremities: Pulses intact, baseline lymphedema noted  Neurological: Alert and orient x 4      PMHx  S/P ROBOTIC ASSISTED RIGHT Bi- LOBECTOMY  (LOWER AND MIDDLE) WITH MEDIASTINAL LYMPH NODE DISSECTION   Lung nodule Right lower lobe  Acute postop blood loss anemia   Tobacco Abuse    Meds:  Lasix 40 mg twice daily  Potassium chloride 40 mEq twice daily  VTE prophylaxis: Lovenox 30 mg sub-q BID  Pain management: Tylenol 1000 mg q 6hrs  Tramadol 50 mg q6hrs PRN  Gabapentin 300 mg BID   Glycolax 17 g daily  Senokot 8.6 - 5.0 mg BID    Plan  Continue Lasix  Continue potassium  DC posterior chest tube  Possibly clamp anterior chest tube with chest x-ray in 4 hours      D/C Goals:   case management following for discharge planning  Cessation of airleak and chest tube removal      Murali Ghotra, APRN - CNP

## 2024-05-31 ENCOUNTER — APPOINTMENT (OUTPATIENT)
Dept: GENERAL RADIOLOGY | Age: 54
End: 2024-05-31
Attending: THORACIC SURGERY (CARDIOTHORACIC VASCULAR SURGERY)
Payer: COMMERCIAL

## 2024-05-31 VITALS
HEIGHT: 65 IN | HEART RATE: 99 BPM | RESPIRATION RATE: 18 BRPM | WEIGHT: 233.03 LBS | BODY MASS INDEX: 38.82 KG/M2 | OXYGEN SATURATION: 98 % | SYSTOLIC BLOOD PRESSURE: 114 MMHG | DIASTOLIC BLOOD PRESSURE: 82 MMHG | TEMPERATURE: 97.5 F

## 2024-05-31 LAB
H CAPSUL AG UR IA-ACNC: NOT DETECTED NG/ML
H CAPSUL AG UR QL IA: NOT DETECTED
HIV 1+2 AB+HIV1 P24 AG SERPL QL IA: NORMAL
HIV 2 AB SERPL QL IA: NORMAL
HIV1 AB SERPL QL IA: NORMAL
HIV1 P24 AG SERPL QL IA: NORMAL
MISCELLANEOUS LAB TEST ORDER: NORMAL

## 2024-05-31 PROCEDURE — 71045 X-RAY EXAM CHEST 1 VIEW: CPT

## 2024-05-31 PROCEDURE — 6370000000 HC RX 637 (ALT 250 FOR IP): Performed by: STUDENT IN AN ORGANIZED HEALTH CARE EDUCATION/TRAINING PROGRAM

## 2024-05-31 PROCEDURE — 99024 POSTOP FOLLOW-UP VISIT: CPT

## 2024-05-31 PROCEDURE — 6370000000 HC RX 637 (ALT 250 FOR IP)

## 2024-05-31 PROCEDURE — 6360000002 HC RX W HCPCS

## 2024-05-31 PROCEDURE — 6370000000 HC RX 637 (ALT 250 FOR IP): Performed by: THORACIC SURGERY (CARDIOTHORACIC VASCULAR SURGERY)

## 2024-05-31 PROCEDURE — 94761 N-INVAS EAR/PLS OXIMETRY MLT: CPT

## 2024-05-31 PROCEDURE — 2580000003 HC RX 258

## 2024-05-31 PROCEDURE — 94640 AIRWAY INHALATION TREATMENT: CPT

## 2024-05-31 PROCEDURE — 99232 SBSQ HOSP IP/OBS MODERATE 35: CPT | Performed by: INTERNAL MEDICINE

## 2024-05-31 RX ORDER — TRAMADOL HYDROCHLORIDE 50 MG/1
50 TABLET ORAL EVERY 6 HOURS PRN
Qty: 28 TABLET | Refills: 0 | Status: ON HOLD | OUTPATIENT
Start: 2024-05-31 | End: 2024-06-07

## 2024-05-31 RX ORDER — FUROSEMIDE 40 MG/1
40 TABLET ORAL DAILY
Qty: 2 TABLET | Refills: 0 | Status: ON HOLD | OUTPATIENT
Start: 2024-06-01 | End: 2024-06-03

## 2024-05-31 RX ORDER — POTASSIUM CHLORIDE 20 MEQ/1
20 TABLET, EXTENDED RELEASE ORAL DAILY
Qty: 2 TABLET | Refills: 0 | Status: ON HOLD | OUTPATIENT
Start: 2024-05-31 | End: 2024-06-02

## 2024-05-31 RX ORDER — FUROSEMIDE 40 MG/1
40 TABLET ORAL DAILY
Status: DISCONTINUED | OUTPATIENT
Start: 2024-05-31 | End: 2024-05-31 | Stop reason: HOSPADM

## 2024-05-31 RX ORDER — POTASSIUM CHLORIDE 20 MEQ/1
40 TABLET, EXTENDED RELEASE ORAL
Status: DISCONTINUED | OUTPATIENT
Start: 2024-05-31 | End: 2024-05-31 | Stop reason: HOSPADM

## 2024-05-31 RX ORDER — POLYETHYLENE GLYCOL 3350 17 G/17G
17 POWDER, FOR SOLUTION ORAL DAILY PRN
Qty: 30 PACKET | Refills: 0 | Status: ON HOLD | OUTPATIENT
Start: 2024-05-31 | End: 2024-06-30

## 2024-05-31 RX ORDER — POTASSIUM CHLORIDE 20 MEQ/1
40 TABLET, EXTENDED RELEASE ORAL
Status: DISCONTINUED | OUTPATIENT
Start: 2024-06-01 | End: 2024-05-31

## 2024-05-31 RX ADMIN — ACETAMINOPHEN 1000 MG: 500 TABLET ORAL at 00:06

## 2024-05-31 RX ADMIN — FUROSEMIDE 40 MG: 40 TABLET ORAL at 09:43

## 2024-05-31 RX ADMIN — POLYETHYLENE GLYCOL 3350 17 G: 17 POWDER, FOR SOLUTION ORAL at 09:43

## 2024-05-31 RX ADMIN — POTASSIUM CHLORIDE 40 MEQ: 1500 TABLET, EXTENDED RELEASE ORAL at 09:45

## 2024-05-31 RX ADMIN — ENOXAPARIN SODIUM 30 MG: 100 INJECTION SUBCUTANEOUS at 09:43

## 2024-05-31 RX ADMIN — SODIUM CHLORIDE, PRESERVATIVE FREE 10 ML: 5 INJECTION INTRAVENOUS at 09:43

## 2024-05-31 RX ADMIN — SENNOSIDES AND DOCUSATE SODIUM 1 TABLET: 50; 8.6 TABLET ORAL at 09:43

## 2024-05-31 RX ADMIN — ACETAMINOPHEN 1000 MG: 500 TABLET ORAL at 06:10

## 2024-05-31 RX ADMIN — ACETAMINOPHEN 1000 MG: 500 TABLET ORAL at 12:01

## 2024-05-31 RX ADMIN — IPRATROPIUM BROMIDE AND ALBUTEROL SULFATE 1 DOSE: 2.5; .5 SOLUTION RESPIRATORY (INHALATION) at 08:24

## 2024-05-31 RX ADMIN — IPRATROPIUM BROMIDE AND ALBUTEROL SULFATE 1 DOSE: 2.5; .5 SOLUTION RESPIRATORY (INHALATION) at 13:06

## 2024-05-31 ASSESSMENT — ENCOUNTER SYMPTOMS
COUGH: 0
ABDOMINAL PAIN: 0
EYE REDNESS: 0
EYE DISCHARGE: 0
WHEEZING: 0
NAUSEA: 0
DIARRHEA: 0
BACK PAIN: 0
SHORTNESS OF BREATH: 0
SINUS PRESSURE: 0
SINUS PAIN: 0
RHINORRHEA: 0
SORE THROAT: 0
CONSTIPATION: 0

## 2024-05-31 NOTE — FLOWSHEET NOTE
Chest tubes meet criteria to remove per open heart protocol. No air leak and no crepitus noted. Dressings removed.  Incision within normal limits.  Site cleansed and prepped per protocol.  Chest tubes X 1 removed without difficulty.  Vaseline gauze and dry sterile dressing applied.  Bilateral breath sounds audible. O2Sats 94% on room air. Patient tolerated well.

## 2024-05-31 NOTE — PROGRESS NOTES
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  Necrotizing granulomatous involving subcarinal lymph node, level 4R lymph node and level 7 lymph node  S/p robotic assisted right lower and middle lobe lobectomy with mediastinal lymph node dissection surgery on 05/22/2024 -surgical pathology was 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                      Immunization History: All immunization history was reviewed by me today.      There is no immunization history on file for this patient.    Known drug allergies:     All allergies were reviewed and updated    Allergies   Allergen Reactions    Naproxen Anaphylaxis, Hives and Shortness Of Breath       Social history:     Social History:  All social andepidemiologic history was reviewed and updated by me today as needed.     Tobacco use:   reports that she has been smoking cigarettes. She started smoking about 28 years ago. She has a 28.3 pack-year smoking history. She has never used smokeless tobacco.  Alcohol use:   reports current alcohol use.  Currently lives in: [place] [address]   reports no history of drug use.     COVID VACCINATION AND LAB RESULT RECORDS:     Internal Administration   First Dose

## 2024-05-31 NOTE — PROGRESS NOTES
[facility] Cardiothoracic Progress Note:    Surgery: ROBOTIC ASSISTED RIGHT Bi- LOBECTOMY (LOWER AND MIDDLE) WITH MEDIASTINAL LYMPH NODE DISSECTION   Surgeon: Dr. Milan  POD #: 9    Subjective:   5/31: Patient seen and examined, sitting in chair in NAD; unclamped Chest tubed with 10 ml drainage, 20 ml overnight.     Vital Signs: /67   Pulse 91   Temp 97.6 °F (36.4 °C) (Temporal)   Resp 18   Ht 1.651 m (5' 5\")   Wt 105.7 kg (233 lb 0.4 oz)   SpO2 96%   BMI 38.78 kg/m²  O2 Flow Rate (L/min): 0 L/min     Admission Weight: Weight - Scale: 110.2 kg (243 lb)    5/22/24 - 109.8 kg Pre-op   5/23/24 - 107.7 kg  5/24/24 - 108.3 kg  5/28/24-  113.3 kg  5/29/24-  109 kg  5/30/24-  107.4 kg  5/31/24 -  105.7 kg    Intake/Output:   Intake/Output Summary (Last 24 hours) at 5/31/2024 0912  Last data filed at 5/31/2024 0731  Gross per 24 hour   Intake --   Output 45 ml   Net -45 ml        Chest tubes/Drains:   #1 Right Anterior  20cc/24hrs, 10cc/12 hrs overnight     LABORATORY DATA:    CBC: No results for input(s): \"WBC\", \"HGB\", \"HCT\", \"MCV\", \"PLT\" in the last 72 hours.  BMP: No results for input(s): \"NA\", \"K\", \"CL\", \"CO2\", \"PHOS\", \"BUN\", \"CREATININE\" in the last 72 hours.    Invalid input(s): \"CA\"  MG:  No results for input(s): \"MG\" in the last 72 hours.   Cardiac Enzymes: No results for input(s): \"CKTOTAL\", \"CKMB\", \"CKMBINDEX\", \"TROPONINI\" in the last 72 hours.  PT/INR: No results for input(s): \"PROTIME\", \"INR\" in the last 72 hours.  APTT: No results for input(s): \"APTT\" in the last 72 hours.    Physicial Exam:   General appearance: NAD  Lungs: diminished  Heart: SR on monitor  Chest: symmetrical expansion with inspiration and expirations; sternum stable   Abdomen: +bowel sounds, non-tender, soft  Wound/Incisions: robotic port incision site CDI  Extremities: BLE pulses intact; mild LE  edema bilaterally   Neurological: A/O x4      PMHx  S/P ROBOTIC ASSISTED RIGHT Bi- LOBECTOMY (LOWER AND  MIDDLE) WITH MEDIASTINAL LYMPH NODE DISSECTION   Lung nodule Right lower lobe  Acute postop blood loss anemia   Tobacco Abuse    Cardiac Meds:  Lasix 40 mg daily  Potassium 40 mEq daily      Non-cardiac Meds:  VTE prophylaxis: Lovenox 30 mg sub-q 2 times daily  Pain management: tylenol       Today's Plan:  Remove Chest Tube >> repeat CXR at noon  continue IS, acapella, ambulate in hallway  Discharge today (will need Lasix 40 mg and K 40 mEq x 3 days at discharge)  Discharge order      D/C Goals:   case management following for discharge planning

## 2024-05-31 NOTE — PROGRESS NOTES
CLINICAL PHARMACY NOTE: MEDS TO BEDS    Total # of Prescriptions Filled: 3   The following medications were delivered to the patient:  Potassium cl ER 20meq  Furosemide 40mg  Tramadol 50mg    Additional Documentation:     Medications were picked up in the Outpatient Pharmacy by patient. Pt did not want polyethylene glycol solution (miralax) due to cost.    Bonita Mendiola CPhT

## 2024-05-31 NOTE — PLAN OF CARE
Problem: Discharge Planning  Goal: Discharge to home or other facility with appropriate resources  5/31/2024 1350 by Sallie Wells RN  Outcome: Adequate for Discharge  5/31/2024 1041 by Sallie Wells RN  Outcome: Progressing     Problem: Pain  Goal: Verbalizes/displays adequate comfort level or baseline comfort level  5/31/2024 1350 by Sallie Wells RN  Outcome: Adequate for Discharge  5/31/2024 1041 by Sallie Wells RN  Outcome: Progressing     Problem: Skin/Tissue Integrity  Goal: Absence of new skin breakdown  Description: 1.  Monitor for areas of redness and/or skin breakdown  2.  Assess vascular access sites hourly  3.  Every 4-6 hours minimum:  Change oxygen saturation probe site  4.  Every 4-6 hours:  If on nasal continuous positive airway pressure, respiratory therapy assess nares and determine need for appliance change or resting period.  5/31/2024 1350 by Sallie Wells RN  Outcome: Adequate for Discharge  5/31/2024 1041 by Sallie Wells RN  Outcome: Progressing     Problem: ABCDS Injury Assessment  Goal: Absence of physical injury  5/31/2024 1350 by Sallie Wells RN  Outcome: Adequate for Discharge  5/31/2024 1041 by Sallie Wells RN  Outcome: Progressing     Problem: Safety - Adult  Goal: Free from fall injury  5/31/2024 1350 by Sallie Wells RN  Outcome: Adequate for Discharge  5/31/2024 1041 by Sallie Wells RN  Outcome: Progressing

## 2024-05-31 NOTE — DISCHARGE SUMMARY
Discharge Summary      Date of Admission:  5/22/2024  5:44 AM    Date of Discharge:  5/31/2024  Discharge to: Home     Condition at Discharge: Stable    Surgeon: Dr. Milan  Pulmonologist: Dr. Salazar   PCP:  Sallie Becker MD        Diagnosis: Lung nodule     Hospital Course: The patient is a 53 y.o. female active smoker who was found to have an incidental right lower lobe lung nodule when she underwent CT scan of the abdomen in December 2023 to evaluate peripheral edema. Repeat CT scan in a 3-month intervals slight enlargement of the nodule from 1.2 x 1 cm to 1.5 x 1.2 cm. PET scan was weakly positive and CT-guided needle biopsy showed abnormal tissue although unable to further differentiate because of crush artifact. Patient underwent a robotic assisted right lower lobe with mediastinal lymph node dissection on 5/22/24.     Post-surgical recovery course:     5/25:  + Airleak but decreased compared with yesterday intermittent with tidaling     5/26: Airleak continues to improve on a daily basis now only intermittent with coughing only     5/28: CT to water seal/ continued diuresis     5/29: ID consulted; orders placed for findings of histoplasmosis;  serum and urine histoplasma antigens, baseline HIV screen, immunoglobulin levels and CD4 and CD8 cell count and blood cultures     5/30: ID following cultures and screening; plans set in place if pt becomes immunocompromise in the future or if any of the above blood tests for the urine histoplasma antigen come back positive. Plan itraconazole. F/U with ID 1 month s/p discharge.    5/31: CXR removed with repeat CXR prior to discharge home    Past Medical History:  Past Medical History:   Diagnosis Date    Edema     left leg    Lung nodule     right    Smoker        Past Surgical History:  Past Surgical History:   Procedure Laterality Date    CARPAL TUNNEL RELEASE Right     CHOLECYSTECTOMY      with hiatal hernia rep    CT NEEDLE BIOPSY LUNG

## 2024-05-31 NOTE — CARE COORDINATION
No home care services needed. Pt independent. CM called Jae with [facility]  who was following to let him know services not needed.    Patient discharged 5/31/2024 to home.   All discharge needs met per case management.    Liyah Barfield RN, BSN  [phone]

## 2024-06-01 ENCOUNTER — HOSPITAL ENCOUNTER (INPATIENT)
Age: 54
LOS: 9 days | Discharge: HOME | DRG: 871 | End: 2024-06-10
Attending: EMERGENCY MEDICINE | Admitting: INTERNAL MEDICINE
Payer: COMMERCIAL

## 2024-06-01 ENCOUNTER — APPOINTMENT (OUTPATIENT)
Dept: GENERAL RADIOLOGY | Age: 54
DRG: 871 | End: 2024-06-01
Payer: COMMERCIAL

## 2024-06-01 ENCOUNTER — APPOINTMENT (OUTPATIENT)
Dept: CT IMAGING | Age: 54
DRG: 871 | End: 2024-06-01
Payer: COMMERCIAL

## 2024-06-01 DIAGNOSIS — B39.4: ICD-10-CM

## 2024-06-01 DIAGNOSIS — R70.0 ELEVATED SED RATE: ICD-10-CM

## 2024-06-01 DIAGNOSIS — R79.82 ELEVATED C-REACTIVE PROTEIN (CRP): ICD-10-CM

## 2024-06-01 DIAGNOSIS — Z09 S/P LUNG SURGERY, FOLLOW-UP EXAM: ICD-10-CM

## 2024-06-01 DIAGNOSIS — R07.9 CHEST PAIN, UNSPECIFIED TYPE: Primary | ICD-10-CM

## 2024-06-01 DIAGNOSIS — R91.1 LUNG NODULE: ICD-10-CM

## 2024-06-01 PROBLEM — R07.1 CHEST PAIN ON BREATHING: Status: ACTIVE | Noted: 2024-06-01

## 2024-06-01 LAB
ALBUMIN SERPL-MCNC: 4.2 G/DL (ref 3.4–5)
ALBUMIN/GLOB SERPL: 1 {RATIO} (ref 1.1–2.2)
ALP SERPL-CCNC: 44 U/L (ref 40–129)
ALT SERPL-CCNC: 15 U/L (ref 10–40)
ANION GAP SERPL CALCULATED.3IONS-SCNC: 17 MMOL/L (ref 3–16)
AST SERPL-CCNC: 22 U/L (ref 15–37)
BASOPHILS # BLD: 0 K/UL (ref 0–0.2)
BASOPHILS NFR BLD: 0.2 %
BILIRUB SERPL-MCNC: 0.8 MG/DL (ref 0–1)
BUN SERPL-MCNC: 19 MG/DL (ref 7–20)
CALCIUM SERPL-MCNC: 9.7 MG/DL (ref 8.3–10.6)
CHLORIDE SERPL-SCNC: 93 MMOL/L (ref 99–110)
CO2 SERPL-SCNC: 22 MMOL/L (ref 21–32)
CREAT SERPL-MCNC: 0.7 MG/DL (ref 0.6–1.1)
CRP SERPL-MCNC: 250.8 MG/L (ref 0–5.1)
DEPRECATED RDW RBC AUTO: 13.5 % (ref 12.4–15.4)
EOSINOPHIL # BLD: 0.2 K/UL (ref 0–0.6)
EOSINOPHIL NFR BLD: 1.1 %
ERYTHROCYTE [SEDIMENTATION RATE] IN BLOOD BY WESTERGREN METHOD: 91 MM/HR (ref 0–30)
GFR SERPLBLD CREATININE-BSD FMLA CKD-EPI: >90 ML/MIN/{1.73_M2}
GLUCOSE SERPL-MCNC: 127 MG/DL (ref 70–99)
HCT VFR BLD AUTO: 36.2 % (ref 36–48)
HGB BLD-MCNC: 12.5 G/DL (ref 12–16)
LACTATE BLDV-SCNC: 0.7 MMOL/L (ref 0.4–1.9)
LYMPHOCYTES # BLD: 1 K/UL (ref 1–5.1)
LYMPHOCYTES NFR BLD: 5.1 %
MCH RBC QN AUTO: 30.3 PG (ref 26–34)
MCHC RBC AUTO-ENTMCNC: 34.5 G/DL (ref 31–36)
MCV RBC AUTO: 87.8 FL (ref 80–100)
MONOCYTES # BLD: 1.9 K/UL (ref 0–1.3)
MONOCYTES NFR BLD: 9.3 %
NEUTROPHILS # BLD: 17.2 K/UL (ref 1.7–7.7)
NEUTROPHILS NFR BLD: 84.3 %
NT-PROBNP SERPL-MCNC: 92 PG/ML (ref 0–124)
PLATELET # BLD AUTO: 431 K/UL (ref 135–450)
PMV BLD AUTO: 7.4 FL (ref 5–10.5)
POTASSIUM SERPL-SCNC: 4.7 MMOL/L (ref 3.5–5.1)
PROCALCITONIN SERPL IA-MCNC: 0.1 NG/ML (ref 0–0.15)
PROT SERPL-MCNC: 8.5 G/DL (ref 6.4–8.2)
RBC # BLD AUTO: 4.13 M/UL (ref 4–5.2)
SODIUM SERPL-SCNC: 132 MMOL/L (ref 136–145)
TROPONIN, HIGH SENSITIVITY: 9 NG/L (ref 0–14)
TROPONIN, HIGH SENSITIVITY: 9 NG/L (ref 0–14)
WBC # BLD AUTO: 20.4 K/UL (ref 4–11)

## 2024-06-01 PROCEDURE — 6360000002 HC RX W HCPCS: Performed by: PHYSICIAN ASSISTANT

## 2024-06-01 PROCEDURE — 99285 EMERGENCY DEPT VISIT HI MDM: CPT

## 2024-06-01 PROCEDURE — 96365 THER/PROPH/DIAG IV INF INIT: CPT

## 2024-06-01 PROCEDURE — 96375 TX/PRO/DX INJ NEW DRUG ADDON: CPT

## 2024-06-01 PROCEDURE — 6370000000 HC RX 637 (ALT 250 FOR IP): Performed by: EMERGENCY MEDICINE

## 2024-06-01 PROCEDURE — 71260 CT THORAX DX C+: CPT

## 2024-06-01 PROCEDURE — 2700000000 HC OXYGEN THERAPY PER DAY

## 2024-06-01 PROCEDURE — 1200000000 HC SEMI PRIVATE

## 2024-06-01 PROCEDURE — 6360000004 HC RX CONTRAST MEDICATION: Performed by: EMERGENCY MEDICINE

## 2024-06-01 PROCEDURE — 83605 ASSAY OF LACTIC ACID: CPT

## 2024-06-01 PROCEDURE — 87040 BLOOD CULTURE FOR BACTERIA: CPT

## 2024-06-01 PROCEDURE — 83880 ASSAY OF NATRIURETIC PEPTIDE: CPT

## 2024-06-01 PROCEDURE — 6360000002 HC RX W HCPCS: Performed by: EMERGENCY MEDICINE

## 2024-06-01 PROCEDURE — 80053 COMPREHEN METABOLIC PANEL: CPT

## 2024-06-01 PROCEDURE — 86140 C-REACTIVE PROTEIN: CPT

## 2024-06-01 PROCEDURE — 36415 COLL VENOUS BLD VENIPUNCTURE: CPT

## 2024-06-01 PROCEDURE — 2580000003 HC RX 258: Performed by: EMERGENCY MEDICINE

## 2024-06-01 PROCEDURE — 85025 COMPLETE CBC W/AUTO DIFF WBC: CPT

## 2024-06-01 PROCEDURE — 84484 ASSAY OF TROPONIN QUANT: CPT

## 2024-06-01 PROCEDURE — 71045 X-RAY EXAM CHEST 1 VIEW: CPT

## 2024-06-01 PROCEDURE — 85652 RBC SED RATE AUTOMATED: CPT

## 2024-06-01 PROCEDURE — 84145 PROCALCITONIN (PCT): CPT

## 2024-06-01 RX ORDER — ASPIRIN 81 MG/1
324 TABLET, CHEWABLE ORAL ONCE
Status: COMPLETED | OUTPATIENT
Start: 2024-06-01 | End: 2024-06-01

## 2024-06-01 RX ORDER — MORPHINE SULFATE 4 MG/ML
4 INJECTION, SOLUTION INTRAMUSCULAR; INTRAVENOUS EVERY 30 MIN PRN
Status: DISCONTINUED | OUTPATIENT
Start: 2024-06-01 | End: 2024-06-02 | Stop reason: HOSPADM

## 2024-06-01 RX ORDER — IBUPROFEN 600 MG/1
600 TABLET ORAL ONCE
Status: COMPLETED | OUTPATIENT
Start: 2024-06-01 | End: 2024-06-01

## 2024-06-01 RX ORDER — ONDANSETRON 2 MG/ML
4 INJECTION INTRAMUSCULAR; INTRAVENOUS EVERY 6 HOURS PRN
Status: DISCONTINUED | OUTPATIENT
Start: 2024-06-01 | End: 2024-06-02 | Stop reason: HOSPADM

## 2024-06-01 RX ADMIN — PIPERACILLIN AND TAZOBACTAM 3375 MG: 3; .375 INJECTION, POWDER, LYOPHILIZED, FOR SOLUTION INTRAVENOUS at 22:42

## 2024-06-01 RX ADMIN — ONDANSETRON 4 MG: 2 INJECTION INTRAMUSCULAR; INTRAVENOUS at 20:46

## 2024-06-01 RX ADMIN — ASPIRIN 324 MG: 81 TABLET, CHEWABLE ORAL at 22:47

## 2024-06-01 RX ADMIN — MORPHINE SULFATE 4 MG: 4 INJECTION, SOLUTION INTRAMUSCULAR; INTRAVENOUS at 20:46

## 2024-06-01 RX ADMIN — IBUPROFEN 600 MG: 600 TABLET, FILM COATED ORAL at 22:47

## 2024-06-01 RX ADMIN — IOPAMIDOL 75 ML: 755 INJECTION, SOLUTION INTRAVENOUS at 20:07

## 2024-06-01 ASSESSMENT — LIFESTYLE VARIABLES: HOW OFTEN DO YOU HAVE A DRINK CONTAINING ALCOHOL: NEVER

## 2024-06-01 ASSESSMENT — PAIN - FUNCTIONAL ASSESSMENT: PAIN_FUNCTIONAL_ASSESSMENT: 0-10

## 2024-06-01 ASSESSMENT — PAIN DESCRIPTION - LOCATION: LOCATION: CHEST

## 2024-06-01 ASSESSMENT — PAIN SCALES - GENERAL: PAINLEVEL_OUTOF10: 10

## 2024-06-02 LAB
ANION GAP SERPL CALCULATED.3IONS-SCNC: 17 MMOL/L (ref 3–16)
BACTERIA URNS QL MICRO: ABNORMAL /HPF
BASOPHILS # BLD: 0 K/UL (ref 0–0.2)
BASOPHILS NFR BLD: 0.3 %
BILIRUB UR QL STRIP.AUTO: NEGATIVE
BUN SERPL-MCNC: 24 MG/DL (ref 7–20)
CALCIUM SERPL-MCNC: 9.6 MG/DL (ref 8.3–10.6)
CHLORIDE SERPL-SCNC: 111 MMOL/L (ref 99–110)
CLARITY UR: CLEAR
CO2 SERPL-SCNC: 23 MMOL/L (ref 21–32)
COLOR UR: ABNORMAL
CREAT SERPL-MCNC: 0.9 MG/DL (ref 0.6–1.1)
DEPRECATED RDW RBC AUTO: 13.6 % (ref 12.4–15.4)
DEPRECATED RDW RBC AUTO: 13.6 % (ref 12.4–15.4)
EOSINOPHIL # BLD: 0.4 K/UL (ref 0–0.6)
EOSINOPHIL NFR BLD: 2.1 %
EPI CELLS #/AREA URNS AUTO: 9 /HPF (ref 0–5)
GFR SERPLBLD CREATININE-BSD FMLA CKD-EPI: 75 ML/MIN/{1.73_M2}
GLUCOSE SERPL-MCNC: 126 MG/DL (ref 70–99)
GLUCOSE UR STRIP.AUTO-MCNC: NEGATIVE MG/DL
HCT VFR BLD AUTO: 33.5 % (ref 36–48)
HCT VFR BLD AUTO: 34.8 % (ref 36–48)
HGB BLD-MCNC: 11.1 G/DL (ref 12–16)
HGB BLD-MCNC: 11.8 G/DL (ref 12–16)
HGB UR QL STRIP.AUTO: NEGATIVE
HYALINE CASTS #/AREA URNS AUTO: 2 /LPF (ref 0–8)
KETONES UR STRIP.AUTO-MCNC: NEGATIVE MG/DL
LEUKOCYTE ESTERASE UR QL STRIP.AUTO: ABNORMAL
LYMPHOCYTES # BLD: 1.2 K/UL (ref 1–5.1)
LYMPHOCYTES NFR BLD: 7 %
MCH RBC QN AUTO: 29.5 PG (ref 26–34)
MCH RBC QN AUTO: 29.7 PG (ref 26–34)
MCHC RBC AUTO-ENTMCNC: 33.3 G/DL (ref 31–36)
MCHC RBC AUTO-ENTMCNC: 33.8 G/DL (ref 31–36)
MCV RBC AUTO: 88 FL (ref 80–100)
MCV RBC AUTO: 88.7 FL (ref 80–100)
MONOCYTES # BLD: 1.4 K/UL (ref 0–1.3)
MONOCYTES NFR BLD: 8.5 %
NEUTROPHILS # BLD: 13.8 K/UL (ref 1.7–7.7)
NEUTROPHILS NFR BLD: 82.1 %
NITRITE UR QL STRIP.AUTO: NEGATIVE
PH UR STRIP.AUTO: 5 [PH] (ref 5–8)
PLATELET # BLD AUTO: 394 K/UL (ref 135–450)
PLATELET # BLD AUTO: 420 K/UL (ref 135–450)
PMV BLD AUTO: 7.7 FL (ref 5–10.5)
PMV BLD AUTO: 7.9 FL (ref 5–10.5)
POTASSIUM SERPL-SCNC: 5.2 MMOL/L (ref 3.5–5.1)
PROT UR STRIP.AUTO-MCNC: NEGATIVE MG/DL
RBC # BLD AUTO: 3.77 M/UL (ref 4–5.2)
RBC # BLD AUTO: 3.95 M/UL (ref 4–5.2)
RBC CLUMPS #/AREA URNS AUTO: 2 /HPF (ref 0–4)
SODIUM SERPL-SCNC: 132 MMOL/L (ref 136–145)
SODIUM SERPL-SCNC: 151 MMOL/L (ref 136–145)
SP GR UR STRIP.AUTO: >=1.03 (ref 1–1.03)
TROPONIN, HIGH SENSITIVITY: 10 NG/L (ref 0–14)
TROPONIN, HIGH SENSITIVITY: 12 NG/L (ref 0–14)
UA COMPLETE W REFLEX CULTURE PNL UR: ABNORMAL
UA DIPSTICK W REFLEX MICRO PNL UR: YES
URN SPEC COLLECT METH UR: ABNORMAL
UROBILINOGEN UR STRIP-ACNC: 1 E.U./DL
WBC # BLD AUTO: 16.8 K/UL (ref 4–11)
WBC # BLD AUTO: 19.3 K/UL (ref 4–11)
WBC #/AREA URNS AUTO: 6 /HPF (ref 0–5)

## 2024-06-02 PROCEDURE — 84295 ASSAY OF SERUM SODIUM: CPT

## 2024-06-02 PROCEDURE — 2580000003 HC RX 258: Performed by: INTERNAL MEDICINE

## 2024-06-02 PROCEDURE — 6370000000 HC RX 637 (ALT 250 FOR IP): Performed by: STUDENT IN AN ORGANIZED HEALTH CARE EDUCATION/TRAINING PROGRAM

## 2024-06-02 PROCEDURE — 85027 COMPLETE CBC AUTOMATED: CPT

## 2024-06-02 PROCEDURE — 84484 ASSAY OF TROPONIN QUANT: CPT

## 2024-06-02 PROCEDURE — 6370000000 HC RX 637 (ALT 250 FOR IP): Performed by: INTERNAL MEDICINE

## 2024-06-02 PROCEDURE — 84300 ASSAY OF URINE SODIUM: CPT

## 2024-06-02 PROCEDURE — 6360000002 HC RX W HCPCS: Performed by: INTERNAL MEDICINE

## 2024-06-02 PROCEDURE — 85025 COMPLETE CBC W/AUTO DIFF WBC: CPT

## 2024-06-02 PROCEDURE — 81001 URINALYSIS AUTO W/SCOPE: CPT

## 2024-06-02 PROCEDURE — 84133 ASSAY OF URINE POTASSIUM: CPT

## 2024-06-02 PROCEDURE — 1200000000 HC SEMI PRIVATE

## 2024-06-02 PROCEDURE — 83935 ASSAY OF URINE OSMOLALITY: CPT

## 2024-06-02 PROCEDURE — 80048 BASIC METABOLIC PNL TOTAL CA: CPT

## 2024-06-02 PROCEDURE — 36415 COLL VENOUS BLD VENIPUNCTURE: CPT

## 2024-06-02 RX ORDER — OXYCODONE HYDROCHLORIDE 5 MG/1
10 TABLET ORAL EVERY 4 HOURS PRN
Status: DISCONTINUED | OUTPATIENT
Start: 2024-06-02 | End: 2024-06-10 | Stop reason: HOSPADM

## 2024-06-02 RX ORDER — SODIUM CHLORIDE 0.9 % (FLUSH) 0.9 %
5-40 SYRINGE (ML) INJECTION EVERY 12 HOURS SCHEDULED
Status: DISCONTINUED | OUTPATIENT
Start: 2024-06-02 | End: 2024-06-10 | Stop reason: HOSPADM

## 2024-06-02 RX ORDER — SODIUM CHLORIDE 9 MG/ML
INJECTION, SOLUTION INTRAVENOUS PRN
Status: DISCONTINUED | OUTPATIENT
Start: 2024-06-02 | End: 2024-06-10 | Stop reason: HOSPADM

## 2024-06-02 RX ORDER — POTASSIUM CHLORIDE 7.45 MG/ML
10 INJECTION INTRAVENOUS PRN
Status: DISCONTINUED | OUTPATIENT
Start: 2024-06-02 | End: 2024-06-10 | Stop reason: HOSPADM

## 2024-06-02 RX ORDER — MAGNESIUM SULFATE IN WATER 40 MG/ML
2000 INJECTION, SOLUTION INTRAVENOUS PRN
Status: DISCONTINUED | OUTPATIENT
Start: 2024-06-02 | End: 2024-06-10 | Stop reason: HOSPADM

## 2024-06-02 RX ORDER — ACETAMINOPHEN 325 MG/1
650 TABLET ORAL EVERY 6 HOURS PRN
Status: DISCONTINUED | OUTPATIENT
Start: 2024-06-02 | End: 2024-06-10 | Stop reason: HOSPADM

## 2024-06-02 RX ORDER — ACETAMINOPHEN 650 MG/1
650 SUPPOSITORY RECTAL EVERY 6 HOURS PRN
Status: DISCONTINUED | OUTPATIENT
Start: 2024-06-02 | End: 2024-06-10 | Stop reason: HOSPADM

## 2024-06-02 RX ORDER — IBUPROFEN 600 MG/1
600 TABLET ORAL
Status: DISCONTINUED | OUTPATIENT
Start: 2024-06-02 | End: 2024-06-05

## 2024-06-02 RX ORDER — SODIUM CHLORIDE 0.9 % (FLUSH) 0.9 %
5-40 SYRINGE (ML) INJECTION PRN
Status: DISCONTINUED | OUTPATIENT
Start: 2024-06-02 | End: 2024-06-10 | Stop reason: HOSPADM

## 2024-06-02 RX ORDER — DEXTROSE MONOHYDRATE 50 MG/ML
INJECTION, SOLUTION INTRAVENOUS CONTINUOUS
Status: DISCONTINUED | OUTPATIENT
Start: 2024-06-02 | End: 2024-06-02 | Stop reason: ALTCHOICE

## 2024-06-02 RX ORDER — MORPHINE SULFATE 2 MG/ML
2 INJECTION, SOLUTION INTRAMUSCULAR; INTRAVENOUS EVERY 4 HOURS PRN
Status: DISCONTINUED | OUTPATIENT
Start: 2024-06-02 | End: 2024-06-10 | Stop reason: HOSPADM

## 2024-06-02 RX ORDER — OXYCODONE HYDROCHLORIDE 5 MG/1
5 TABLET ORAL EVERY 4 HOURS PRN
Status: DISCONTINUED | OUTPATIENT
Start: 2024-06-02 | End: 2024-06-10 | Stop reason: HOSPADM

## 2024-06-02 RX ORDER — ATORVASTATIN CALCIUM 40 MG/1
40 TABLET, FILM COATED ORAL NIGHTLY
Status: DISCONTINUED | OUTPATIENT
Start: 2024-06-02 | End: 2024-06-10 | Stop reason: HOSPADM

## 2024-06-02 RX ORDER — ONDANSETRON 2 MG/ML
4 INJECTION INTRAMUSCULAR; INTRAVENOUS EVERY 6 HOURS PRN
Status: DISCONTINUED | OUTPATIENT
Start: 2024-06-02 | End: 2024-06-10 | Stop reason: HOSPADM

## 2024-06-02 RX ORDER — POLYETHYLENE GLYCOL 3350 17 G/17G
17 POWDER, FOR SOLUTION ORAL DAILY PRN
Status: DISCONTINUED | OUTPATIENT
Start: 2024-06-02 | End: 2024-06-10 | Stop reason: HOSPADM

## 2024-06-02 RX ORDER — POTASSIUM CHLORIDE 20 MEQ/1
40 TABLET, EXTENDED RELEASE ORAL PRN
Status: DISCONTINUED | OUTPATIENT
Start: 2024-06-02 | End: 2024-06-10 | Stop reason: HOSPADM

## 2024-06-02 RX ORDER — ASPIRIN 81 MG/1
81 TABLET, CHEWABLE ORAL DAILY
Status: DISCONTINUED | OUTPATIENT
Start: 2024-06-02 | End: 2024-06-10 | Stop reason: HOSPADM

## 2024-06-02 RX ORDER — ENOXAPARIN SODIUM 100 MG/ML
30 INJECTION SUBCUTANEOUS 2 TIMES DAILY
Status: DISCONTINUED | OUTPATIENT
Start: 2024-06-02 | End: 2024-06-10 | Stop reason: HOSPADM

## 2024-06-02 RX ORDER — ONDANSETRON 4 MG/1
4 TABLET, ORALLY DISINTEGRATING ORAL EVERY 8 HOURS PRN
Status: DISCONTINUED | OUTPATIENT
Start: 2024-06-02 | End: 2024-06-10 | Stop reason: HOSPADM

## 2024-06-02 RX ORDER — TRAMADOL HYDROCHLORIDE 50 MG/1
50 TABLET ORAL EVERY 6 HOURS PRN
Status: DISCONTINUED | OUTPATIENT
Start: 2024-06-02 | End: 2024-06-10 | Stop reason: HOSPADM

## 2024-06-02 RX ADMIN — SODIUM CHLORIDE 25 ML: 9 INJECTION, SOLUTION INTRAVENOUS at 20:57

## 2024-06-02 RX ADMIN — DEXTROSE MONOHYDRATE: 50 INJECTION, SOLUTION INTRAVENOUS at 14:59

## 2024-06-02 RX ADMIN — ENOXAPARIN SODIUM 30 MG: 100 INJECTION SUBCUTANEOUS at 20:56

## 2024-06-02 RX ADMIN — ENOXAPARIN SODIUM 30 MG: 100 INJECTION SUBCUTANEOUS at 08:03

## 2024-06-02 RX ADMIN — IBUPROFEN 600 MG: 600 TABLET ORAL at 13:09

## 2024-06-02 RX ADMIN — SODIUM CHLORIDE, PRESERVATIVE FREE 10 ML: 5 INJECTION INTRAVENOUS at 03:43

## 2024-06-02 RX ADMIN — TRAMADOL HYDROCHLORIDE 50 MG: 50 TABLET ORAL at 08:03

## 2024-06-02 RX ADMIN — SODIUM CHLORIDE 25 ML: 9 INJECTION, SOLUTION INTRAVENOUS at 03:42

## 2024-06-02 RX ADMIN — MORPHINE SULFATE 2 MG: 2 INJECTION, SOLUTION INTRAMUSCULAR; INTRAVENOUS at 11:38

## 2024-06-02 RX ADMIN — IBUPROFEN 600 MG: 600 TABLET ORAL at 17:18

## 2024-06-02 RX ADMIN — ATORVASTATIN CALCIUM 40 MG: 40 TABLET, FILM COATED ORAL at 20:56

## 2024-06-02 RX ADMIN — MORPHINE SULFATE 2 MG: 2 INJECTION, SOLUTION INTRAMUSCULAR; INTRAVENOUS at 03:43

## 2024-06-02 RX ADMIN — PIPERACILLIN AND TAZOBACTAM 3375 MG: 3; .375 INJECTION, POWDER, LYOPHILIZED, FOR SOLUTION INTRAVENOUS at 13:13

## 2024-06-02 RX ADMIN — OXYCODONE 10 MG: 5 TABLET ORAL at 20:55

## 2024-06-02 RX ADMIN — ACETAMINOPHEN 650 MG: 325 TABLET ORAL at 08:03

## 2024-06-02 RX ADMIN — Medication 10 ML: at 08:04

## 2024-06-02 RX ADMIN — ASPIRIN 81 MG: 81 TABLET, CHEWABLE ORAL at 08:03

## 2024-06-02 RX ADMIN — PIPERACILLIN AND TAZOBACTAM 3375 MG: 3; .375 INJECTION, POWDER, LYOPHILIZED, FOR SOLUTION INTRAVENOUS at 20:58

## 2024-06-02 RX ADMIN — PIPERACILLIN AND TAZOBACTAM 3375 MG: 3; .375 INJECTION, POWDER, LYOPHILIZED, FOR SOLUTION INTRAVENOUS at 03:43

## 2024-06-02 RX ADMIN — ACETAMINOPHEN 650 MG: 325 TABLET ORAL at 00:41

## 2024-06-02 RX ADMIN — OXYCODONE 10 MG: 5 TABLET ORAL at 14:54

## 2024-06-02 RX ADMIN — ATORVASTATIN CALCIUM 40 MG: 40 TABLET, FILM COATED ORAL at 00:41

## 2024-06-02 ASSESSMENT — PAIN DESCRIPTION - ORIENTATION
ORIENTATION_2: UPPER;MID
ORIENTATION: LOWER
ORIENTATION: RIGHT
ORIENTATION: RIGHT
ORIENTATION_2: RIGHT;OUTER
ORIENTATION: MID;UPPER
ORIENTATION: LEFT
ORIENTATION: UPPER;MID
ORIENTATION: LOWER
ORIENTATION: LOWER
ORIENTATION_2: RIGHT;OUTER

## 2024-06-02 ASSESSMENT — PAIN DESCRIPTION - LOCATION
LOCATION: RIB CAGE
LOCATION: CHEST
LOCATION: BACK
LOCATION_2: CHEST
LOCATION: BACK
LOCATION_2: CHEST
LOCATION: CHEST
LOCATION: BACK
LOCATION: CHEST
LOCATION_2: CHEST
LOCATION: BACK

## 2024-06-02 ASSESSMENT — PAIN SCALES - GENERAL
PAINLEVEL_OUTOF10: 7
PAINLEVEL_OUTOF10: 1
PAINLEVEL_OUTOF10: 7
PAINLEVEL_OUTOF10: 0
PAINLEVEL_OUTOF10: 0
PAINLEVEL_OUTOF10: 5
PAINLEVEL_OUTOF10: 7
PAINLEVEL_OUTOF10: 0
PAINLEVEL_OUTOF10: 2
PAINLEVEL_OUTOF10: 0
PAINLEVEL_OUTOF10: 6

## 2024-06-02 ASSESSMENT — PAIN DESCRIPTION - DESCRIPTORS
DESCRIPTORS: ACHING
DESCRIPTORS: DISCOMFORT;BURNING
DESCRIPTORS: ACHING;THROBBING
DESCRIPTORS: ACHING
DESCRIPTORS_2: BURNING
DESCRIPTORS: ACHING
DESCRIPTORS: DULL;STABBING
DESCRIPTORS: ACHING
DESCRIPTORS_2: DULL
DESCRIPTORS_2: NUMBNESS;DULL
DESCRIPTORS: BURNING

## 2024-06-02 ASSESSMENT — PAIN - FUNCTIONAL ASSESSMENT
PAIN_FUNCTIONAL_ASSESSMENT: PREVENTS OR INTERFERES SOME ACTIVE ACTIVITIES AND ADLS
PAIN_FUNCTIONAL_ASSESSMENT: ACTIVITIES ARE NOT PREVENTED
PAIN_FUNCTIONAL_ASSESSMENT_SITE2: PREVENTS OR INTERFERES SOME ACTIVE ACTIVITIES AND ADLS
PAIN_FUNCTIONAL_ASSESSMENT: ACTIVITIES ARE NOT PREVENTED
PAIN_FUNCTIONAL_ASSESSMENT_SITE2: ACTIVITIES ARE NOT PREVENTED
PAIN_FUNCTIONAL_ASSESSMENT: PREVENTS OR INTERFERES SOME ACTIVE ACTIVITIES AND ADLS
PAIN_FUNCTIONAL_ASSESSMENT_SITE2: ACTIVITIES ARE NOT PREVENTED

## 2024-06-02 ASSESSMENT — PAIN DESCRIPTION - INTENSITY
RATING_2: 1
RATING_2: 3
RATING_2: 3

## 2024-06-02 ASSESSMENT — PAIN DESCRIPTION - PAIN TYPE
TYPE: ACUTE PAIN
TYPE: SURGICAL PAIN
TYPE: ACUTE PAIN
TYPE: SURGICAL PAIN

## 2024-06-02 ASSESSMENT — PAIN DESCRIPTION - FREQUENCY
FREQUENCY: CONTINUOUS

## 2024-06-02 ASSESSMENT — PAIN DESCRIPTION - ONSET
ONSET: ON-GOING

## 2024-06-02 NOTE — PROGRESS NOTES
Pt admitted to room 5906 from ED. VSS. Pt A&Ox4. POC updated with pt, all questions answered. Oriented pt to room and call light. Call light and bedside table within reach. Instructed to call out with any needs, v/u.

## 2024-06-02 NOTE — PROGRESS NOTES
Pt comfortable, offered morphine for pain relief and pt declined, pt was administered 650 of PO Tylenol, see MAR. ST elevation noted on telemetry. 12 lead EKG performed. NP notified, awaiting response.

## 2024-06-02 NOTE — CARE COORDINATION
Case Management Assessment  Initial Evaluation    Date/Time of Evaluation: 6/2/2024 9:39 AM  Assessment Completed by: Raeann Lockhart    If patient is discharged prior to next notation, then this note serves as note for discharge by case management.    Patient Name: Cici Hernández                   YOB: 1970  Diagnosis: Elevated C-reactive protein (CRP) [R79.82]  Elevated sed rate [R70.0]  Chest pain on breathing [R07.1]  Chest pain, unspecified type [R07.9]                   Date / Time: 6/1/2024  6:31 PM    Patient Admission Status: Inpatient   Readmission Risk (Low < 19, Mod (19-27), High > 27): Readmission Risk Score: 13.1    Current PCP: Sallie Becker MD  PCP verified by CM? (P) Yes    Chart Reviewed: Yes      History Provided by: (P) Patient  Patient Orientation: (P) Alert and Oriented, Person, Place, Situation    Patient Cognition: (P) Alert    Hospitalization in the last 30 days (Readmission):  Yes    If yes, Readmission Assessment in  Navigator will be completed.    Advance Directives:      Code Status: Full Code   Patient's Primary Decision Maker is: (P) Legal Next of Kin      Discharge Planning:    Patient lives with: (P) Spouse/Significant Other Type of Home: (P) House  Primary Care Giver: (P) Self  Patient Support Systems include: (P) Spouse/Significant Other   Current Financial resources: (P) None  Current community resources: (P) None  Current services prior to admission: (P) None            Current DME:              Type of Home Care services:  (P) None    ADLS  Prior functional level: (P) Independent in ADLs/IADLs  Current functional level: (P) Independent in ADLs/IADLs    PT AM-PAC:   /24  OT AM-PAC:   /24    Family can provide assistance at DC: (P) Yes  Would you like Case Management to discuss the discharge plan with any other family members/significant others, and if so, who? (P) Yes  Plans to Return to Present Housing: (P) Yes  Other Identified Issues/Barriers to  RETURNING to current housing: None  Potential Assistance needed at discharge: (P) Home Care            Potential DME:    Patient expects to discharge to: (P) House  Plan for transportation at discharge: (P) Family    Financial    Payor: BCBS / Plan: BCBS - [place] PPO / Product Type: *No Product type* /     Does insurance require precert for SNF: Yes    Potential assistance Purchasing Medications: (P) No  Meds-to-Beds request:        [id number] - [place], [place] - [address] - P [phone] - F [phone]  [address] [address]  Phone: [phone] Fax: [phone]      Notes:    Factors facilitating achievement of predicted outcomes: Family support and Pleasant    Barriers to discharge: Pain and Medical complications    Additional Case Management Notes: CM met with Pt and her  at bedside.   RA completed.   Pt agreeable to restart HHC with [facility].   CM notified MD that pt will require new C orders. For PT/OT.   Pt independent at base line and denies need for HHC RN     No pending PT/OT evaluations.     CM placed call to [facility]   [phone] to report pt is in hospital, and inquire if they can continues accept Pt for PT/OT. VIRGIL Rowe     The Plan for Transition of Care is related to the following treatment goals of Elevated C-reactive protein (CRP) [R79.82]  Elevated sed rate [R70.0]  Chest pain on breathing [R07.1]  Chest pain, unspecified type [R07.9]    IF APPLICABLE: The Patient and/or patient representative Cici and her family were provided with a choice of provider and agrees with the discharge plan. Freedom of choice list with basic dialogue that supports the patient's individualized plan of care/goals and shares the quality data associated with the providers was provided to:     Patient Representative Name:       The Patient and/or Patient Representative Agree with the Discharge Plan?  Yes    Raeann Lockhart  Case Management  Department  Ph: [phone] Fax: [phone]

## 2024-06-02 NOTE — PROGRESS NOTES
Hospitalist Progress Note      PCP: Sallie Becker MD    Date of Admission: 6/1/2024    Chief Complaint: Left-sided chest pain pleuritic    Hospital Course:   Some left-sided chest pain on deep breaths but much better as compared to yesterday  Not requiring any oxygen  White cell count down to 16 next      Medications:  Reviewed      Exam:    /77   Pulse 93   Temp 98.3 °F (36.8 °C) (Temporal)   Resp 15   Ht 1.651 m (5' 5\")   Wt 102.9 kg (226 lb 14.4 oz)   SpO2 93%   BMI 37.76 kg/m²     General appearance: No apparent distress, appears stated age and cooperative.  HEENT: Pupils equal, round, and reactive to light. Conjunctivae/corneas clear.  Neck: Supple, with full range of motion. No jugular venous distention. Trachea midline.  Respiratory: Decreased air entry at right lung mid and bases  Cardiovascular: Regular rate and rhythm with normal S1/S2 without MURMURS, rubs or gallops.  Abdomen: Soft, non-tender, non-distended with normal bowel sounds.  Musculoskeletal: No clubbing, cyanosis or EDEMA bilaterally.  Full range of motion without deformity.  Skin: Skin color, texture, turgor normal.  No rashes or lesions.  Neurologic:  Neurovascularly intact without any focal sensory/motor deficits. Cranial nerves: II-XII intact, grossly non-focal.      Labs:   Recent Labs     06/01/24 1928 06/02/24  0500 06/02/24  1025   WBC 20.4* 19.3* 16.8*   HGB 12.5 11.1* 11.8*   HCT 36.2 33.5* 34.8*    394 420     Recent Labs     06/01/24 1928 06/02/24  1025   * 151*   K 4.7 5.2*   CL 93* 111*   CO2 22 23   BUN 19 24*   CREATININE 0.7 0.9   CALCIUM 9.7 9.6     Recent Labs     06/01/24 1928   AST 22   ALT 15   BILITOT 0.8   ALKPHOS 44     No results for input(s): \"INR\" in the last 72 hours.  No results for input(s): \"CKTOTAL\", \"TROPONINI\" in the last 72 hours.    Urinalysis:      Lab Results   Component Value Date/Time    NITRU Negative 06/02/2024 03:15 AM    WBCUA 6 06/02/2024 03:15 AM    BACTERIA

## 2024-06-02 NOTE — CARE COORDINATION
06/02/24 0932   Readmission Assessment   Number of Days since last admission? 1-7 days   Previous Disposition Home with Family   Who is being Interviewed Patient   What was the patient's/caregiver's perception as to why they think they needed to return back to the hospital? Other (Comment)  (\"Couldnt get a breath, chest Pain, felt like gas\")   Did you visit your Primary Care Physician after you left the hospital, before you returned this time? No   Why weren't you able to visit your PCP? Other (Comment)  (Pt discharge 5/31 returned 6/1)   Did you see a specialist, such as Cardiac, Pulmonary, Orthopedic Physician, etc. after you left the hospital? No  (Pt discharge 5/31 returned 6/1)   Who advised the patient to return to the hospital? Physician;Self-referral  (Called MD luther and then came to ER)   Does the patient report anything that got in the way of taking their medications? No   In our efforts to provide the best possible care to you and others like you, can you think of anything that we could have done to help you after you left the hospital the first time, so that you might not have needed to return so soon? Other (Comment)  (Pt has nothing to report)     Electronically signed by Raeann Lockhart on 6/2/2024 at 9:35 AM

## 2024-06-02 NOTE — CONSULTS
Cardiothoracic Surgery Consultation       6/2/2024 10:53 AM  Surgeon: Amada Bonds MD    Chief complaint: chest pain    HPI:    Ms. Hernández is a 53 y.o. y.o. female with PMHx of active tobacco use, with a 1.2 x 1 cm to 1.5 x 1.2 cm carcinoid tumor s/p robotic assisted right lower lobe with mediastinal lymph node dissection on 5/22/24 with Dr. Milan. After an expected post-op recovery course, she was discharge to home Friday 5/31/2024. She presented to ED last night with uncontrolled chest pain. Lab workup revealed WBC 20 and elevated CRP, but no elev troponin, BNP. UA without findings of UTI. CT PE protocol demonstrated an expected amount of air and pleural fluid in the R chest, with some subq emphysema (recent CT pull). She was admitted for NSAID treatment for suspected pleuritis/poss pericarditis, and started on broad spectrum Abx. Continues to be afebrile, and WBC count decreasing (16 today)    Review of Systems:  Negative expect as stated above in the HPI    Past Medical History:        Diagnosis Date    Edema     left leg    Lung nodule     right    Smoker        Past Surgical History:        Procedure Laterality Date    CARPAL TUNNEL RELEASE Right     CHOLECYSTECTOMY      with hiatal hernia rep    CT NEEDLE BIOPSY LUNG PERCUTANEOUS  04/16/2024    CT NEEDLE BIOPSY LUNG PERCUTANEOUS 4/16/2024 [facility] CT SCAN    LUNG SURGERY Right 5/22/2024    ROBOTIC ASSISTED RIGHT LOWER LOBE LOBECTOMY WITH MEDIASTINAL LYMPH NODE DISSECTION performed by Sonya Milan MD at [facility] OR    NASAL FRACTURE SURGERY      closed reduction    TUBAL LIGATION         Allergies:  Naproxen    Social History:    TOBACCO:   reports that she has been smoking cigarettes. She started smoking about 28 years ago. She has a 28.3 pack-year smoking history. She has never used smokeless tobacco.  ETOH:   reports current alcohol use.  DRUGS:   reports no history of drug use.    Family History:        Problem Relation Age of  cm carcinoid tumor s/p robotic assisted right lower lobe with mediastinal lymph node dissection on 5/22/24 with Dr. Milan. Path demonstrated carcinoid tumor and 1 LN was positive.    PLAN:  CT findings as expected, and CXR on presentation looks similar to that of discharge. Suspect patient may be slightly dehydrated given hypernatremia. Agree patient may be having pleuritis and recommend continuing NSAID with meals. Will monitor WBC count, and appreciate ID input.     Thank you very much for the opportunity to participate in Cici Hernández's care. Please do not hesitate to reach out with any questions or concerns.     Amada Bonds MD  [place] [address]

## 2024-06-02 NOTE — ED PROVIDER NOTES
[facility] EMERGENCY DEPARTMENT  EMERGENCY DEPARTMENT ENCOUNTER        Pt Name: Cici Hernández  MRN: [id number]  Birthdate 1970  Date of evaluation: 6/1/2024  Provider: Belkis Dodd PA-C  PCP: Sallie Becker MD  Note Started: 10:08 PM EDT 6/1/24       I have seen and evaluated this patient with my supervising physician Arsh Lemus DO.      CHIEF COMPLAINT       Chief Complaint   Patient presents with    Chest Pain     Chest pain started last night. S/p 2 right lobectomy on 5/22/24.       HISTORY OF PRESENT ILLNESS: 1 or more Elements     History From: Patient  Limitations to history : None    Cici Hernández is a 53 y.o. female who presents to the emergency department today for evaluation for concerns of chest pain.  The patient states that she has been experiencing pain to her upper chest, mostly on the right side, and this has been constant since yesterday.  The patient underwent a right middle and lower lobectomy on 5/22/2024.  States that she tolerated the procedure well without any difficulty.  The patient states that she did have pain after the tube was removed yesterday, she states that the pain worsened into the night.  She reports that her pain is worse when she takes a deep breath, and worse with any sort of movement.  She states that she was given Ultram as well as Tylenol for pain but her pain was continued to be rated as a 10/10.  She denies feeling short of breath.  She has no nausea or vomiting.  She has no cough or congestion.  Patient otherwise has no other    Nursing Notes were all reviewed and agreed with or any disagreements were addressed in the HPI.    REVIEW OF SYSTEMS :      Review of Systems   Constitutional:  Negative for activity change, appetite change, chills and fever.   HENT:  Negative for congestion and rhinorrhea.    Respiratory:  Negative for cough and shortness of breath.    Cardiovascular:  Positive for chest pain.   Gastrointestinal:  Negative  grossly clear within the limitations of motion artifact.  The central airways are otherwise patent.  No left pleural effusion or pneumothorax. Upper Abdomen: Status post cholecystectomy.  Trace amount of pneumoperitoneum in the upper abdomen just anterior to the liver and within the right retroperitoneum. Soft Tissues/Bones: There is subcutaneous emphysema in the anterior abdominal wall.  No acute osseous abnormality.     1. No saddle pulmonary embolus.  Otherwise limited exam due to suboptimal bolus and motion artifact. 2. Postsurgical changes from right middle and lower lobectomy with a small to moderate right hydropneumothorax.  More focal foci of gas in the right lung base may reflect superimposed infection in the appropriate clinical setting. 3. Subcutaneous emphysema in the anterior chest wall as well as a trace amount of pneumoperitoneum anterior and posterior to the right liver.  These could be postsurgical in etiology. 4. A few patchy ground-glass opacities in the right lung, nonspecific and may be infectious or inflammatory in etiology.     XR CHEST PORTABLE    Result Date: 6/1/2024  EXAMINATION: ONE XRAY VIEW OF THE CHEST 6/1/2024 7:36 pm COMPARISON: 05/31/2024 HISTORY: ORDERING SYSTEM PROVIDED HISTORY: cp s/o lobectomy TECHNOLOGIST PROVIDED HISTORY: Reason for exam:->cp s/o lobectomy Reason for Exam: cp s/o lobectomy FINDINGS: Small right pleural effusion with adjacent atelectasis.  No pneumothorax. Low lung volumes.  Stable cardiomediastinal silhouette.  The left costophrenic angle sharp.  The osseous structures are stable.     Small right pleural effusion with adjacent atelectasis.  No obvious pneumothorax identified.     XR CHEST PORTABLE    Result Date: 5/31/2024  EXAMINATION: ONE XRAY VIEW OF THE CHEST 5/31/2024 11:13 am COMPARISON: Chest radiograph earlier today HISTORY: ORDERING SYSTEM PROVIDED HISTORY: post chest tube removal TECHNOLOGIST PROVIDED HISTORY: Reason for exam:->post chest tube  right lung    Disposition Considerations (tests considered but not done, Admit vs D/C, Shared Decision Making, Pt Expectation of Test or Tx.):    EKG is documented by my attending, does have some ST segment and J-point elevation, likely concerning for pericarditis    CBC shows a leukocytosis of 20.4, no evidence of anemia.  Her CMP is unremarkable.  Troponin is negative.  Sed rate and CRP are elevated    CT of chest shows no evidence of PE.  Postsurgical changes from my right middle and lower lobectomy with a small to moderate right hydropneumothorax.    Patient does have 2 negative troponins.  Discussion with CT surgery these are normal findings on the CT.  Due to the patient's EKG, elevated sed rate, CRP I am concerned for possible pericarditis, feel that she would benefit from admission, hospitalist has been paged for admission, patient is updated, agreeable with plan, stable for admission  I am the Primary Clinician of Record.  FINAL IMPRESSION      1. Chest pain, unspecified type    2. Elevated sed rate    3. Elevated C-reactive protein (CRP)          DISPOSITION/PLAN     DISPOSITION Decision To Admit 06/01/2024 10:51:42 PM      PATIENT REFERRED TO:  No follow-up provider specified.    DISCHARGE MEDICATIONS:  New Prescriptions    No medications on file       DISCONTINUED MEDICATIONS:  Discontinued Medications    No medications on file              (Please note that portions of this note were completed with a voice recognition program.  Efforts were made to edit the dictations but occasionally words are mis-transcribed.)    Belkis Dodd PA-C (electronically signed)       Belkis Dodd PA-C  06/01/24 0479

## 2024-06-02 NOTE — H&P
[facility] Medicine History & Physical      Patient Name: Cici Hernández    : 1970    PCP: Sallie Becker MD    Date of Service:  Patient seen and examined on 24     Chief Complaint: Chest pain    History Of Present Illness:    Cici Hernández is a 53 y.o. female with a PMH of lung nodule status post robotic assisted right lower lobe with mediastinal lymph node dissection on 2024 who presented to ED with complaint of chest pain.    Presenting with right upper chest pain of note status post right middle and lower lobe lobectomy 2024.  Of note complained of pains after chest tube was removed yesterday worsening when she takes a deep breath or any kind of movement no relief with current pain medication of Tylenol and Ultram.    Vital signs shows blood pressure 116/75 pulse of 96 respiration 20 temperature 98.2 saturating 97% on room air.  Labs show sodium of 132 chloride of 93 glucose of 127 lactic of 0.7 procalcitonin 0.10 CRP of 250.8, proBNP of 92, troponin of 9/9, LFT stable.  WBC 20.4, hemoglobin 12.5.  Blood culture x 2 sent.    Chest x-ray done shows small right pleural effusion with adjacent atelectasis no obvious pneumothorax identified.  CT chest with PE protocol shows no saddle pulmonary emboli postsurgical changes from right middle and lower lobe with a small to moderate right hydropneumothorax.  Subcutaneous emphysema in the anterior chest wall as well as a trace amount of edema mediastinum anterior and posterior to the right liver, most likely postsurgical in etiology.  A few patchy ground glass opacities in the right lung nonspecific and may be infectious or inflammatory in etiology.    In the ED patient received 325 mg of aspirin, Advil 600 mg, Zosyn 3.375 mg.  Cardiothoracic surgery consulted.      Past Medical History:    Patient has a past medical history of Edema, Lung nodule, and Smoker.    Past Surgical History:    Patient has a past surgical history that

## 2024-06-02 NOTE — ED PROVIDER NOTES
In addition to the advanced practice provider, I personally saw Cici Hernández and performed a substantive portion of the visit including all aspects of the medical decision making. I made/approved the management plan and take responsibility for the patient management    Briefly, this is a 53 y.o. female here for chest pain.  Patient underwent right lobectomy on 5/22/2024, she had her chest tubes removed yesterday and was discharged from the hospital.  Patient states when the chest tubes were removed, she had a sudden sharp pain in her chest.  Initially was mild, however has gradually been worsening since returning home.  Pain radiates from her central chest into her throat.  Worsened with breathing and movement.  She denies any fevers, chills or abdominal pain.    On exam, patient afebrile and nontoxic. No distress. Heart RRR. Lungs diminished on right with scattered rhonchi, no wheezing. Abdomen soft, nondistended, nontender to palpation in all quadrants.  Surgical incisions overlying right chest wall appear well-approximated, no surrounding erythema or induration.      EKG  EKG was reviewed by emergency department physician in the absence of a cardiologist    Narrow complex sinus rhythm, normal axis, normal AL and QRS intervals, normal Qtc, <1mm J-point elevation I and aVL, trace ST elevations in septal and lateral leads, AL depression lead II, no ST depressions or reciprocal changes, normal t-wave morphology, impression NSR with nonspecific ST morphology, no STEMI      Screenings   [place] Coma Scale  Eye Opening: Spontaneous  Best Verbal Response: Oriented  Best Motor Response: Obeys commands  Alfred Coma Scale Score: 15          MDM    Patient afebrile and nontoxic.  She appears uncomfortable however is in no marj painful distress.  No hypoxia or increased work of breathing while on room air.  EKG with diffuse ST elevations with AL depression, does not clearly fit ischemic pattern, patient also has normal

## 2024-06-02 NOTE — ED NOTES
for the following components:    Sodium 132 (*)     Chloride 93 (*)     Anion Gap 17 (*)     Glucose 127 (*)     Total Protein 8.5 (*)     Albumin/Globulin Ratio 1.0 (*)     All other components within normal limits   SEDIMENTATION RATE - Abnormal; Notable for the following components:    Sed Rate, Automated 91 (*)     All other components within normal limits   C-REACTIVE PROTEIN - Abnormal; Notable for the following components:    .8 (*)     All other components within normal limits     Critical values: no     Abnormal Assessment Findings:     Background  History:   Past Medical History:   Diagnosis Date    Edema     left leg    Lung nodule     right    Smoker        Assessment    Vitals/MEWS: MEWS Score: 2  Level of Consciousness: Alert (0)   Vitals:    06/01/24 1846 06/01/24 2102 06/01/24 2237 06/01/24 2301   BP: 114/85 98/67 112/80 116/75   Pulse: (!) 101 97 98 96   Resp: 18 22 20 20   Temp: 98.2 °F (36.8 °C)      TempSrc: Oral      SpO2: 92% 97% 97% 97%   Weight: 105.2 kg (232 lb)      Height: 1.651 m (5' 5\")        FiO2 (%): nasal cannula for respiratory distress  O2 Flow Rate: O2 Device: Nasal cannula O2 Flow Rate (L/min): 2 L/min  Cardiac Rhythm:    Pain Assessment:  [x] Verbal [] Urbina Peralta Scale  Pain Scale: Pain Assessment  Pain Assessment: 0-10  Pain Level: 10  Pain Location: Chest  Last documented pain score (0-10 scale) Pain Level: 10  Last documented pain medication administered: 2046  Mental Status: oriented, alert, coherent, logical, thought processes intact, and able to concentrate and follow conversation  Orientation Level:    NIH Score:    C-SSRS: Risk of Suicide: No Risk  Bedside swallow:    Alfred Coma Scale (GCS): Harbert Coma Scale  Eye Opening: Spontaneous  Best Verbal Response: Oriented  Best Motor Response: Obeys commands  Alfred Coma Scale Score: 15  Active LDA's:   Peripheral IV Left Antecubital (Active)                 PO Status: Regular  Pertinent or High Risk  Medications/Drips: no   If Yes, please provide details:   Pending Blood Product Administration: no       You may also review the ED PT Care Timeline found under the Summary Nursing Index tab.    Recommendation    Pending orders Inpatient orders  Plan for Discharge (if known):   Additional Comments: Able to use call light. Swallows pills whole. On O2 d/t desatting to 89% after morphine. Family at bedside.    If any further questions, please call Sending RN at [phone]    Electronically signed by: Electronically signed by Aleah Ngo RN on 6/1/2024 at 11:52 PM

## 2024-06-03 PROBLEM — E66.812 OBESITY, CLASS II, BMI 35-39.9: Status: ACTIVE | Noted: 2024-06-03

## 2024-06-03 PROBLEM — R07.9 CHEST PAIN: Status: ACTIVE | Noted: 2024-06-01

## 2024-06-03 PROBLEM — E66.9 OBESITY, CLASS II, BMI 35-39.9: Status: ACTIVE | Noted: 2024-06-03

## 2024-06-03 PROBLEM — R79.82 ELEVATED C-REACTIVE PROTEIN (CRP): Status: ACTIVE | Noted: 2024-06-03

## 2024-06-03 PROBLEM — B39.4: Status: ACTIVE | Noted: 2024-05-29

## 2024-06-03 PROBLEM — Z90.2 STATUS POST LOBECTOMY OF LUNG: Status: ACTIVE | Noted: 2024-06-03

## 2024-06-03 PROBLEM — A41.9 SEPSIS (HCC): Status: ACTIVE | Noted: 2024-06-03

## 2024-06-03 PROBLEM — R70.0 ELEVATED SED RATE: Status: ACTIVE | Noted: 2024-06-03

## 2024-06-03 LAB
ANION GAP SERPL CALCULATED.3IONS-SCNC: 13 MMOL/L (ref 3–16)
BASOPHILS # BLD: 0 K/UL (ref 0–0.2)
BASOPHILS NFR BLD: 0.3 %
BUN SERPL-MCNC: 24 MG/DL (ref 7–20)
CALCIUM SERPL-MCNC: 9.3 MG/DL (ref 8.3–10.6)
CHLORIDE SERPL-SCNC: 96 MMOL/L (ref 99–110)
CO2 SERPL-SCNC: 24 MMOL/L (ref 21–32)
CREAT SERPL-MCNC: 0.9 MG/DL (ref 0.6–1.1)
DEPRECATED RDW RBC AUTO: 13.5 % (ref 12.4–15.4)
EOSINOPHIL # BLD: 0.4 K/UL (ref 0–0.6)
EOSINOPHIL NFR BLD: 3.5 %
GFR SERPLBLD CREATININE-BSD FMLA CKD-EPI: 75 ML/MIN/{1.73_M2}
GLUCOSE SERPL-MCNC: 123 MG/DL (ref 70–99)
HCT VFR BLD AUTO: 33 % (ref 36–48)
HGB BLD-MCNC: 11.5 G/DL (ref 12–16)
LYMPHOCYTES # BLD: 1.2 K/UL (ref 1–5.1)
LYMPHOCYTES NFR BLD: 9.3 %
MCH RBC QN AUTO: 30.8 PG (ref 26–34)
MCHC RBC AUTO-ENTMCNC: 35 G/DL (ref 31–36)
MCV RBC AUTO: 88.2 FL (ref 80–100)
MONOCYTES # BLD: 1.4 K/UL (ref 0–1.3)
MONOCYTES NFR BLD: 11.5 %
NEUTROPHILS # BLD: 9.5 K/UL (ref 1.7–7.7)
NEUTROPHILS NFR BLD: 75.4 %
OSMOLALITY UR: 611 MOSM/KG (ref 390–1070)
PLATELET # BLD AUTO: 379 K/UL (ref 135–450)
PMV BLD AUTO: 7.5 FL (ref 5–10.5)
POTASSIUM SERPL-SCNC: 4 MMOL/L (ref 3.5–5.1)
POTASSIUM UR-SCNC: 75.6 MMOL/L
RBC # BLD AUTO: 3.74 M/UL (ref 4–5.2)
SODIUM SERPL-SCNC: 133 MMOL/L (ref 136–145)
SODIUM UR-SCNC: <20 MMOL/L
WBC # BLD AUTO: 12.6 K/UL (ref 4–11)

## 2024-06-03 PROCEDURE — 99233 SBSQ HOSP IP/OBS HIGH 50: CPT | Performed by: INTERNAL MEDICINE

## 2024-06-03 PROCEDURE — 2580000003 HC RX 258: Performed by: INTERNAL MEDICINE

## 2024-06-03 PROCEDURE — 6360000002 HC RX W HCPCS: Performed by: INTERNAL MEDICINE

## 2024-06-03 PROCEDURE — 36415 COLL VENOUS BLD VENIPUNCTURE: CPT

## 2024-06-03 PROCEDURE — 6370000000 HC RX 637 (ALT 250 FOR IP): Performed by: INTERNAL MEDICINE

## 2024-06-03 PROCEDURE — 80048 BASIC METABOLIC PNL TOTAL CA: CPT

## 2024-06-03 PROCEDURE — 85025 COMPLETE CBC W/AUTO DIFF WBC: CPT

## 2024-06-03 PROCEDURE — 87205 SMEAR GRAM STAIN: CPT

## 2024-06-03 PROCEDURE — 87070 CULTURE OTHR SPECIMN AEROBIC: CPT

## 2024-06-03 PROCEDURE — 1200000000 HC SEMI PRIVATE

## 2024-06-03 PROCEDURE — 87641 MR-STAPH DNA AMP PROBE: CPT

## 2024-06-03 PROCEDURE — 6370000000 HC RX 637 (ALT 250 FOR IP): Performed by: STUDENT IN AN ORGANIZED HEALTH CARE EDUCATION/TRAINING PROGRAM

## 2024-06-03 PROCEDURE — 87077 CULTURE AEROBIC IDENTIFY: CPT

## 2024-06-03 PROCEDURE — 99223 1ST HOSP IP/OBS HIGH 75: CPT | Performed by: INTERNAL MEDICINE

## 2024-06-03 RX ORDER — SODIUM CHLORIDE 1 G/1
1 TABLET ORAL 2 TIMES DAILY WITH MEALS
Status: DISCONTINUED | OUTPATIENT
Start: 2024-06-03 | End: 2024-06-05

## 2024-06-03 RX ORDER — LACTOBACILLUS RHAMNOSUS GG 10B CELL
1 CAPSULE ORAL 2 TIMES DAILY WITH MEALS
Status: DISCONTINUED | OUTPATIENT
Start: 2024-06-03 | End: 2024-06-10 | Stop reason: HOSPADM

## 2024-06-03 RX ADMIN — IBUPROFEN 600 MG: 600 TABLET ORAL at 12:02

## 2024-06-03 RX ADMIN — Medication 1 G: at 16:11

## 2024-06-03 RX ADMIN — Medication 1 CAPSULE: at 16:10

## 2024-06-03 RX ADMIN — SODIUM CHLORIDE, PRESERVATIVE FREE 10 ML: 5 INJECTION INTRAVENOUS at 02:15

## 2024-06-03 RX ADMIN — PIPERACILLIN AND TAZOBACTAM 3375 MG: 3; .375 INJECTION, POWDER, LYOPHILIZED, FOR SOLUTION INTRAVENOUS at 20:58

## 2024-06-03 RX ADMIN — OXYCODONE 5 MG: 5 TABLET ORAL at 00:59

## 2024-06-03 RX ADMIN — ENOXAPARIN SODIUM 30 MG: 100 INJECTION SUBCUTANEOUS at 20:49

## 2024-06-03 RX ADMIN — Medication 10 ML: at 21:00

## 2024-06-03 RX ADMIN — Medication 1 CAPSULE: at 12:01

## 2024-06-03 RX ADMIN — SODIUM CHLORIDE: 9 INJECTION, SOLUTION INTRAVENOUS at 12:05

## 2024-06-03 RX ADMIN — ENOXAPARIN SODIUM 30 MG: 100 INJECTION SUBCUTANEOUS at 08:12

## 2024-06-03 RX ADMIN — ASPIRIN 81 MG: 81 TABLET, CHEWABLE ORAL at 08:13

## 2024-06-03 RX ADMIN — PIPERACILLIN AND TAZOBACTAM 3375 MG: 3; .375 INJECTION, POWDER, LYOPHILIZED, FOR SOLUTION INTRAVENOUS at 12:09

## 2024-06-03 RX ADMIN — SODIUM CHLORIDE, PRESERVATIVE FREE 10 ML: 5 INJECTION INTRAVENOUS at 04:30

## 2024-06-03 RX ADMIN — TRAMADOL HYDROCHLORIDE 50 MG: 50 TABLET ORAL at 04:33

## 2024-06-03 RX ADMIN — ATORVASTATIN CALCIUM 40 MG: 40 TABLET, FILM COATED ORAL at 20:49

## 2024-06-03 RX ADMIN — OXYCODONE 10 MG: 5 TABLET ORAL at 16:10

## 2024-06-03 RX ADMIN — IBUPROFEN 600 MG: 600 TABLET ORAL at 08:12

## 2024-06-03 RX ADMIN — OXYCODONE 10 MG: 5 TABLET ORAL at 08:12

## 2024-06-03 RX ADMIN — IBUPROFEN 600 MG: 600 TABLET ORAL at 16:11

## 2024-06-03 RX ADMIN — OXYCODONE 10 MG: 5 TABLET ORAL at 20:49

## 2024-06-03 RX ADMIN — PIPERACILLIN AND TAZOBACTAM 3375 MG: 3; .375 INJECTION, POWDER, LYOPHILIZED, FOR SOLUTION INTRAVENOUS at 04:30

## 2024-06-03 ASSESSMENT — PAIN SCALES - GENERAL
PAINLEVEL_OUTOF10: 8
PAINLEVEL_OUTOF10: 7
PAINLEVEL_OUTOF10: 8
PAINLEVEL_OUTOF10: 0
PAINLEVEL_OUTOF10: 5
PAINLEVEL_OUTOF10: 5
PAINLEVEL_OUTOF10: 8
PAINLEVEL_OUTOF10: 3

## 2024-06-03 ASSESSMENT — PAIN DESCRIPTION - PAIN TYPE
TYPE: SURGICAL PAIN
TYPE: SURGICAL PAIN

## 2024-06-03 ASSESSMENT — PAIN DESCRIPTION - ORIENTATION
ORIENTATION: RIGHT

## 2024-06-03 ASSESSMENT — PAIN DESCRIPTION - FREQUENCY
FREQUENCY: CONTINUOUS
FREQUENCY: CONTINUOUS

## 2024-06-03 ASSESSMENT — PAIN DESCRIPTION - DESCRIPTORS
DESCRIPTORS: ACHING
DESCRIPTORS: ACHING

## 2024-06-03 ASSESSMENT — PAIN DESCRIPTION - LOCATION
LOCATION: CHEST
LOCATION: CHEST
LOCATION: RIB CAGE
LOCATION: RIB CAGE
LOCATION: BACK

## 2024-06-03 ASSESSMENT — PAIN DESCRIPTION - ONSET
ONSET: ON-GOING
ONSET: ON-GOING

## 2024-06-03 ASSESSMENT — PAIN SCALES - WONG BAKER: WONGBAKER_NUMERICALRESPONSE: HURTS A LITTLE BIT

## 2024-06-03 NOTE — PROGRESS NOTES
Cardiothoracic Surgery Progress Note    CC: S/P robotic assisted right lower lobe with mediastinal lymph node dissection on 5/22/24 with Dr. Milan. Path demonstrated carcinoid tumor and 1 LN was positive.   POD# 12    Subjective:  Hemodynamically stable, RA, afebrile.  Alert and oriented X 3. Denies shortness of breath or chest pain.       Vital Signs:   /78   Pulse 99   Temp 98.2 °F (36.8 °C) (Temporal)   Resp 14   Ht 1.651 m (5' 5\")   Wt 101.5 kg (223 lb 12.8 oz)   SpO2 94%   BMI 37.24 kg/m²         Physical Exam:   Cardiac: regular, no murmur  Lungs: Absent right lower lung sounds. Clear to auscultation on left side.  Abdomen:  BM  Vascular:  pulses all palpable   Extremities: no edema  : voiding        Labs:   CBC:   Recent Labs     06/02/24  1025 06/03/24  0449   WBC 16.8* 12.6*   HGB 11.8* 11.5*   HCT 34.8* 33.0*    379     BMP:   Recent Labs     06/02/24  1025 06/03/24  0448   K 5.2* 4.0   CREATININE 0.9 0.9   CALCIUM 9.6 9.3     PT/INR: No results for input(s): \"PROTIME\", \"INR\" in the last 72 hours.    Assessment/Plan:  Carcinoid Tumor s/p robotic assisted right lower lobe with mediastinal lymph node dissection on 5/22/24 with Dr. Milan   Pleuritis   Tobacco Abuse      Plan:     - Continue Ibuprofen for pleurisy   - Will consult Pulmonary for additional management.  -  ID onboard: appreciate support  - Currently on Zosyn; leukocytosis improving   - Continue IS and Acapella         Electronically signed by Junior Esquivel PA-C on 6/3/2024 at 8:44 AM

## 2024-06-03 NOTE — CONSULTS
Infectious Diseases   Consult Note        Admission Date: 6/1/2024  Hospital Day: Hospital Day: 3   Attending: Saurabh Yost MD  Date of service: 6/3/24     Reason for admission: Elevated C-reactive protein (CRP) [R79.82]  Elevated sed rate [R70.0]  Chest pain on breathing [R07.1]  Chest pain, unspecified type [R07.9]    Chief complaint/ Reason for consult: Sepsis    Microbiology:        I have reviewed allavailable micro lab data and cultures    Blood culture (2/2) - collected on 6/1/2024: In process      Antibiotics and immunizations:       Current antibiotics: All antibiotics and their doses were reviewed by me    Recent Abx Admin                     piperacillin-tazobactam (ZOSYN) 3,375 mg in sodium chloride 0.9 % 50 mL IVPB (mini-bag) (mg) 3,375 mg New Bag 06/03/24 0430     3,375 mg New Bag 06/02/24 2058     3,375 mg New Bag  1313                      Immunization History: All immunization history was reviewed by me today.      There is no immunization history on file for this patient.    Known drug allergies:     All allergies were reviewed and updated    Allergies   Allergen Reactions    Naproxen Anaphylaxis, Hives and Shortness Of Breath       Social history:     Social History:  All social andepidemiologic history was reviewed and updated by me today as needed.     Tobacco use:   reports that she has been smoking cigarettes. She started smoking about 28 years ago. She has a 28.3 pack-year smoking history. She has never used smokeless tobacco.  Alcohol use:   reports current alcohol use.  Currently lives in: [place] [id number]   reports no history of drug use.     COVID VACCINATION AND LAB RESULT RECORDS:     Internal Administration   First Dose      Second Dose           Last COVID Lab POC Chloride (mmol/L)   Date Value   05/22/2024 112 (H)     POC Glucose (mg/dl)   Date Value   05/22/2024 164 (H)     POC Hematocrit (%)   Date Value   05/22/2024 38.0     POC Potassium (mmol/L)   Date Value   05/22/2024  96%   Weight:       Height:           Physical Exam  Vitals and nursing note reviewed.   Constitutional:       Appearance: She is well-developed. She is not diaphoretic.      Comments: The patient was seen earlier today.   HENT:      Head: Normocephalic and atraumatic.      Right Ear: External ear normal. There is no impacted cerumen.      Left Ear: External ear normal. There is no impacted cerumen.      Nose: Nose normal.      Mouth/Throat:      Mouth: Mucous membranes are moist.      Pharynx: Oropharynx is clear. No oropharyngeal exudate.   Eyes:      General: No scleral icterus.        Right eye: No discharge.         Left eye: No discharge.      Conjunctiva/sclera: Conjunctivae normal.      Pupils: Pupils are equal, round, and reactive to light.   Neck:      Thyroid: No thyromegaly.   Cardiovascular:      Rate and Rhythm: Normal rate and regular rhythm.      Heart sounds: Normal heart sounds. No murmur heard.     No friction rub.   Pulmonary:      Effort: No respiratory distress.      Breath sounds: No stridor. No wheezing or rales.   Abdominal:      General: Bowel sounds are normal.      Palpations: Abdomen is soft.      Tenderness: There is no abdominal tenderness. There is no guarding or rebound.   Musculoskeletal:         General: No swelling, tenderness or deformity. Normal range of motion.      Cervical back: Normal range of motion and neck supple.      Right lower leg: No edema.      Left lower leg: No edema.   Lymphadenopathy:      Cervical: No cervical adenopathy.   Skin:     General: Skin is warm and dry.      Coloration: Skin is not jaundiced.      Findings: No bruising, erythema or rash.      Comments: Some yellowish discharge at the chest tube removal site on the right side of the chest   Neurological:      General: No focal deficit present.      Mental Status: She is alert and oriented to person, place, and time. Mental status is at baseline.      Motor: No abnormal muscle tone.   Psychiatric:     ([facility]  [place] [address]  Office: [phone]  Fax: [phone]  In-person Clinic days:  Tuesday & Thursday a.m.  Virtual clinic days: Monday, Wednesday & Friday a.m.

## 2024-06-03 NOTE — ACP (ADVANCE CARE PLANNING)
Advanced Care Planning Note.    Purpose of Encounter: Advanced care planning in light of chest pain  Parties In Attendance: Patient  Decisional Capacity: Yes  Subjective: Patient with improving pleurisy  Objective: Cr 0.9  Goals of Care Determination: Patient wants full support (CPR, vent, surgery, HD, trach, PEG)  Plan:  IV Abx.  CTS, ID and Renal consults  Code Status: Full code   Time spent on Advanced care Plannin minutes  Advanced Care Planning Documents: Completed advanced directives on chart,  is the POA.    Saurabh Yost MD  6/3/2024 4:35 PM

## 2024-06-03 NOTE — ADT AUTH CERT
Above test results should be finalized by then  Continue to watch for any new fevers, worsening cough,  Night sweats, hemoptysis etc.  Continue close vitals monitoring.  Maintain good glycemic control.  Fall precautions.  Aspiration precautions.  Continue to watch for new fever or diarrhea.  DVT prophylaxis.  Discussed all above with patient and RN.  Discussed with her  at bedside           I/v access Management:     Continue to monitor i.v access sites for erythema, induration, discharge or tenderness.   As always, continue efforts to minimize tubes/lines/drains as clinically appropriate to reduce chances of line associated infections.     Patient education and counseling:        The patient was educated in detail about the side-effects of various antibiotics and things to watch for like new rashes, lip swelling, severe reaction, worsening diarrhea, break through fever etc.  Discussed patient's condition and what to expect. All of the patient's questions were addressed in a satisfactory manner and patient verbalized understanding all instructions.        Level of complexity of visit and medical decision making: High            Thank you for involving me in the care of your patient. I will continue to follow. If you have anyadditional questions, please do not hesitate to contact me.        Subjective:      Interval history: Interval history was obtained from chart review and patient/ RN.  The patient was seen and examined at bedside today.  She is afebrile.  Chest tube has been removed today.  She is saturating 98% on room air     REVIEW OF SYSTEMS:       Review of Systems   Constitutional:  Negative for chills, diaphoresis and fever.   HENT:  Negative for ear discharge, ear pain, postnasal drip, rhinorrhea, sinus pressure, sinus pain and sore throat.    Eyes:  Negative for discharge and redness.   Respiratory:  Negative for cough, shortness of breath and wheezing.    Cardiovascular:  Negative for chest pain

## 2024-06-03 NOTE — PROGRESS NOTES
Hospitalist Progress Note      PCP: Sallie Becker MD    Date of Admission: 6/1/2024    Chief Complaint: Left-sided chest pain pleuritic    Hospital Course:   Patient with improving CP.  No SOB, HA or abdominal pain.  No fevers.      Medications:  Reviewed      Exam:    /76   Pulse 98   Temp 97.8 °F (36.6 °C) (Oral)   Resp 18   Ht 1.651 m (5' 5\")   Wt 101.5 kg (223 lb 12.8 oz)   SpO2 95%   BMI 37.24 kg/m²     General appearance: No apparent distress, appears stated age and cooperative.  HEENT: Pupils equal, round, and reactive to light. Conjunctivae/corneas clear.  Neck: Supple, with full range of motion. No jugular venous distention. Trachea midline.  Respiratory:  Decreased in R base.  Otherwise CTA BL  Cardiovascular: Regular rate and rhythm with normal S1/S2 without MURMURS, rubs or gallops.  Abdomen: Soft, non-tender, obese, non-distended with normal bowel sounds.  Musculoskeletal: No clubbing, cyanosis or edema bilaterally.  Full range of motion without deformity.  Skin: Skin color, texture, turgor normal.  No rashes or lesions.  Neurologic:  Neurovascularly intact without any focal sensory/motor deficits. Grossly non-focal.      Labs:   Recent Labs     06/02/24  0500 06/02/24  1025 06/03/24  0449   WBC 19.3* 16.8* 12.6*   HGB 11.1* 11.8* 11.5*   HCT 33.5* 34.8* 33.0*    420 379       Recent Labs     06/01/24 1928 06/02/24  1025 06/02/24 2051 06/03/24  0448   * 151* 132* 133*   K 4.7 5.2*  --  4.0   CL 93* 111*  --  96*   CO2 22 23  --  24   BUN 19 24*  --  24*   CREATININE 0.7 0.9  --  0.9   CALCIUM 9.7 9.6  --  9.3       Recent Labs     06/01/24 1928   AST 22   ALT 15   BILITOT 0.8   ALKPHOS 44       No results for input(s): \"INR\" in the last 72 hours.  No results for input(s): \"CKTOTAL\", \"TROPONINI\" in the last 72 hours.    Urinalysis:      Lab Results   Component Value Date/Time    NITRU Negative 06/02/2024 03:15 AM    WBCUA 6 06/02/2024 03:15 AM    BACTERIA None Seen  06/02/2024 03:15 AM    RBCUA 2 06/02/2024 03:15 AM    BLOODU Negative 06/02/2024 03:15 AM    GLUCOSEU Negative 06/02/2024 03:15 AM       Radiology:  CT CHEST PULMONARY EMBOLISM W CONTRAST   Preliminary Result   1. No saddle pulmonary embolus.  Otherwise limited exam due to suboptimal   bolus and motion artifact.   2. Postsurgical changes from right middle and lower lobectomy with a small to   moderate right hydropneumothorax.  More focal foci of gas in the right lung   base may reflect superimposed infection in the appropriate clinical setting.   3. Subcutaneous emphysema in the anterior chest wall as well as a trace   amount of pneumoperitoneum anterior and posterior to the right liver.  These   could be postsurgical in etiology.   4. A few patchy ground-glass opacities in the right lung, nonspecific and may   be infectious or inflammatory in etiology.         XR CHEST PORTABLE   Final Result   Small right pleural effusion with adjacent atelectasis.  No obvious   pneumothorax identified.             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] 05/29/2024       Acute Medical Issues Being Addressed:  53-year-old admitted to the hospital with right-sided chest pain    Chest pain   Improving with Ibuprofen 600 mg tid   CT chest changes are consistent with her recent lobectomy  Leukocytosis improving with IV Zosyn  ID consult appreciated, awaiting final Abx plan  CTS input appreciated    Lung carcinoid s/p resection, there was suspicion of histoplasmosis     Hyponatremia  Renal following  On NaCl tabs bid    DVT Prophylaxis Lovenox  Diet: ADULT DIET; Regular; 1500 ml; No Caffeine  Code  Status: Full Code      Dispo - Home    Discussed with patient, nursing and CM.    Awaiting final ID plan of care for Abx.  ? IV Abx at home.    Suarabh Yost MD

## 2024-06-03 NOTE — CONSULTS
PULMONARY AND CRITICAL CARE CONSULTATION NOTE    CONSULTING PHYSICIAN:      REASON FOR CONSULT:   Chief Complaint   Patient presents with    Chest Pain     Chest pain started last night. S/p 2 right lobectomy on 5/22/24.       DATE OF CONSULT: 6/3/2024    HISTORY OF PRESENT ILLNESS: 53 y.o. year old female with past medical history of emphysema, former smoker, right lower lobe lung nodule positive for atypical cells who underwent right lower lobe/right middle lobe lobectomy with mediastinal lymph node dissection on 5/22.  Pathology findings suggestive of typical carcinoid, neuroendocrine tumor grade 1.  4R lymph node positive for typical carcinoid.  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  Histoplasma urine and serum antigen negative.  Fungal blood culture pending.  Lab work showed leukocytosis with white cell count of 20,000 on admission.  Elevated CRP of 250 and elevated ESR of 91.  Normal procalcitonin.  Blood cultures negative.  Patient was discharged on 5/31/2024 however, came back on 6/1/2024 due to right-sided chest pain and difficulty breathing.  Chest x-ray did not show any deterioration.  Postop findings of right hydropneumothorax  CT chest did not show any evidence of PE.  Small to moderate right hydropneumothorax with small subcu emphysema on anterior chest wall.  Foci of gas was seen in right lower lobe with right lower lobe infiltrates.  I personally reviewed and interpreted the images.      REVIEW OF SYSTEMS:   CONSTITUTIONAL SYMPTOMS: The patient denies fever, fatigue, night sweats, weight loss or weight gain.   HEENT: No vision changes. No tinnitus, Denies sinus pain. No hoarseness, or dysphagia.   NECK: Patient denies swelling in the neck.   CARDIOVASCULAR: Denies palpitation, syncope.  RESPIRATORY: Denies shortness of breath, positive for cough.   GASTROINTESTINAL: Denies nausea, abdominal pain or change in bowel function.  GENITOURINARY:  Daily Amount: 200 mg 28 tablet 0    furosemide (LASIX) 40 MG tablet Take 1 tablet by mouth daily for 2 doses (Patient taking differently: Take 1 tablet by mouth daily One more dose due.) 2 tablet 0    polyethylene glycol (GLYCOLAX) 17 g packet Take 1 packet by mouth daily as needed for Constipation 30 packet 0    potassium chloride (KLOR-CON M) 20 MEQ extended release tablet Take 1 tablet by mouth daily for 2 doses (Patient taking differently: Take 1 tablet by mouth daily One more dose due.) 2 tablet 0    vitamin C (ASCORBIC ACID) 500 MG tablet Take 1 tablet by mouth 2 times daily (Patient not taking: Reported on 6/2/2024)      cetirizine (ZYRTEC) 10 MG tablet Take 1 tablet by mouth daily as needed      fluticasone (FLONASE) 50 MCG/ACT nasal spray 2 sprays by Nasal route daily as needed      varenicline (CHANTIX) 1 MG tablet Take 1 tablet by mouth 2 times daily (Patient not taking: Reported on 6/2/2024) 60 tablet 5        sodium chloride flush  5-40 mL IntraVENous 2 times per day    atorvastatin  40 mg Oral Nightly    enoxaparin  30 mg SubCUTAneous BID    aspirin  81 mg Oral Daily    ibuprofen  600 mg Oral TID WC    piperacillin-tazobactam  3,375 mg IntraVENous q8h      sodium chloride Stopped (06/03/24 0430)     traMADol, morphine, sodium chloride flush, sodium chloride, potassium chloride **OR** potassium alternative oral replacement **OR** potassium chloride, magnesium sulfate, ondansetron **OR** ondansetron, acetaminophen **OR** acetaminophen, polyethylene glycol, oxyCODONE, oxyCODONE    ALLERGIES:   Allergies as of 06/01/2024 - Fully Reviewed 06/01/2024   Allergen Reaction Noted    Naproxen Anaphylaxis, Hives, and Shortness Of Breath 11/30/2009      OBJECTIVE:   height is 1.651 m (5' 5\") and weight is 101.5 kg (223 lb 12.8 oz). Her temporal temperature is 97.7 °F (36.5 °C). Her blood pressure is 123/79 and her pulse is 104 (abnormal). Her respiration is 14 and oxygen saturation is 96%.   No intake/output data  right hydropneumothorax with small subcu emphysema on anterior chest wall.  Foci of gas was seen in right lower lobe with right lower lobe infiltrates  Most of the imaging findings are expected post operative.  However, with leukocytosis and elevated CRP and ESR and foci of gas noted in the right lower lobe with right lower lobe infiltrates, I agree with treating with antibiotics.  Continue Zosyn for now.  Improving leukocytosis.  Right-sided chest pain is improving.  Patient is on ibuprofen 3 times daily.  Continue incentive spirometry and Acapella every 2 hours.        Harika Salazar MD  Pulmonary Critical Care and Sleep Medicine  6/3/2024, 10:41 AM    This note was completed using dragon medical speech recognition software. Grammatical errors, random word insertions, pronoun errors and incomplete sentences are occasional consequences of this technology due to software limitations. If there are questions or concerns about the content of this note of information contained within the body of this dictation they should be addressed with the provider for clarification.

## 2024-06-03 NOTE — CONSULTS
[facility]  Consultation Note    Reason for Consult: Hyponatremia  Requesting Physician:  Dr. FRANCHESCA Yost    CHIEF COMPLAINT: Chest pain    History obtained from records and patient.    HISTORY OF PRESENT ILLNESS:                Cici Hernández  is 53 y.o. y.o. female with significant past medical history of lung nodule status post mediastinal lymph node dissection on 5/23/2024, smoking who presents with chest pain after recent mediastinal lymph node dissection.  Chest pain worsens with breathing.  I am asked to see the patient since sodium was 132 on presentation, Increased to 151, in 15 hours, but repeat sodium about 10 hours after, was only 132 and currently sodium is 133.  Potassium was 5.2 but now better at 4.  Creatinine 0.9.  No polyuria.  Denies any nausea and or vomiting.  No diarrhea.    Past Medical History:     has a past medical history of Edema, Lung nodule, and Smoker.   Past Surgical History:     has a past surgical history that includes CT NEEDLE BIOPSY LUNG PERCUTANEOUS (04/16/2024); Tubal ligation; Cholecystectomy; Carpal tunnel release (Right); Nasal fracture surgery; and Lung surgery (Right, 5/22/2024).   Current Medications:    Current Facility-Administered Medications: lactobacillus (CULTURELLE) capsule 1 capsule, 1 capsule, Oral, BID WC  traMADol (ULTRAM) tablet 50 mg, 50 mg, Oral, Q6H PRN  morphine (PF) injection 2 mg, 2 mg, IntraVENous, Q4H PRN  sodium chloride flush 0.9 % injection 5-40 mL, 5-40 mL, IntraVENous, 2 times per day  sodium chloride flush 0.9 % injection 5-40 mL, 5-40 mL, IntraVENous, PRN  0.9 % sodium chloride infusion, , IntraVENous, PRN  potassium chloride (KLOR-CON M) extended release tablet 40 mEq, 40 mEq, Oral, PRN **OR** potassium bicarb-citric acid (EFFER-K) effervescent tablet 40 mEq, 40 mEq, Oral, PRN **OR** potassium chloride 10 mEq/100 mL IVPB (Peripheral Line), 10 mEq, IntraVENous, PRN  magnesium sulfate 2000 mg in 50 mL IVPB premix,

## 2024-06-04 LAB
ANION GAP SERPL CALCULATED.3IONS-SCNC: 14 MMOL/L (ref 3–16)
BASOPHILS # BLD: 0 K/UL (ref 0–0.2)
BASOPHILS NFR BLD: 0.4 %
BUN SERPL-MCNC: 20 MG/DL (ref 7–20)
CALCIUM SERPL-MCNC: 9.2 MG/DL (ref 8.3–10.6)
CHLORIDE SERPL-SCNC: 98 MMOL/L (ref 99–110)
CO2 SERPL-SCNC: 24 MMOL/L (ref 21–32)
CREAT SERPL-MCNC: 0.8 MG/DL (ref 0.6–1.1)
DEPRECATED RDW RBC AUTO: 13.2 % (ref 12.4–15.4)
EOSINOPHIL # BLD: 0.4 K/UL (ref 0–0.6)
EOSINOPHIL NFR BLD: 3.3 %
GFR SERPLBLD CREATININE-BSD FMLA CKD-EPI: 86 ML/MIN/{1.73_M2}
GLUCOSE SERPL-MCNC: 116 MG/DL (ref 70–99)
HCT VFR BLD AUTO: 28.1 % (ref 36–48)
HGB BLD-MCNC: 9.9 G/DL (ref 12–16)
LYMPHOCYTES # BLD: 1.3 K/UL (ref 1–5.1)
LYMPHOCYTES NFR BLD: 12.5 %
MCH RBC QN AUTO: 30.9 PG (ref 26–34)
MCHC RBC AUTO-ENTMCNC: 35.4 G/DL (ref 31–36)
MCV RBC AUTO: 87.4 FL (ref 80–100)
MONOCYTES # BLD: 1.2 K/UL (ref 0–1.3)
MONOCYTES NFR BLD: 11.3 %
MRSA DNA SPEC QL NAA+PROBE: NORMAL
NEUTROPHILS # BLD: 7.8 K/UL (ref 1.7–7.7)
NEUTROPHILS NFR BLD: 72.5 %
PLATELET # BLD AUTO: 392 K/UL (ref 135–450)
PMV BLD AUTO: 7.9 FL (ref 5–10.5)
POTASSIUM SERPL-SCNC: 4.1 MMOL/L (ref 3.5–5.1)
RBC # BLD AUTO: 3.21 M/UL (ref 4–5.2)
SODIUM SERPL-SCNC: 136 MMOL/L (ref 136–145)
TSH SERPL DL<=0.005 MIU/L-ACNC: 1.23 UIU/ML (ref 0.27–4.2)
URATE SERPL-MCNC: 3.9 MG/DL (ref 2.6–6)
WBC # BLD AUTO: 10.7 K/UL (ref 4–11)

## 2024-06-04 PROCEDURE — 1200000000 HC SEMI PRIVATE

## 2024-06-04 PROCEDURE — 80048 BASIC METABOLIC PNL TOTAL CA: CPT

## 2024-06-04 PROCEDURE — 6360000002 HC RX W HCPCS: Performed by: INTERNAL MEDICINE

## 2024-06-04 PROCEDURE — 2580000003 HC RX 258: Performed by: INTERNAL MEDICINE

## 2024-06-04 PROCEDURE — 84443 ASSAY THYROID STIM HORMONE: CPT

## 2024-06-04 PROCEDURE — 6370000000 HC RX 637 (ALT 250 FOR IP): Performed by: INTERNAL MEDICINE

## 2024-06-04 PROCEDURE — 99233 SBSQ HOSP IP/OBS HIGH 50: CPT | Performed by: INTERNAL MEDICINE

## 2024-06-04 PROCEDURE — 84550 ASSAY OF BLOOD/URIC ACID: CPT

## 2024-06-04 PROCEDURE — 36415 COLL VENOUS BLD VENIPUNCTURE: CPT

## 2024-06-04 PROCEDURE — 6370000000 HC RX 637 (ALT 250 FOR IP): Performed by: STUDENT IN AN ORGANIZED HEALTH CARE EDUCATION/TRAINING PROGRAM

## 2024-06-04 PROCEDURE — 85025 COMPLETE CBC W/AUTO DIFF WBC: CPT

## 2024-06-04 RX ADMIN — PIPERACILLIN AND TAZOBACTAM 3375 MG: 3; .375 INJECTION, POWDER, LYOPHILIZED, FOR SOLUTION INTRAVENOUS at 21:52

## 2024-06-04 RX ADMIN — OXYCODONE 10 MG: 5 TABLET ORAL at 01:23

## 2024-06-04 RX ADMIN — POLYETHYLENE GLYCOL 3350 17 G: 17 POWDER, FOR SOLUTION ORAL at 16:13

## 2024-06-04 RX ADMIN — OXYCODONE 5 MG: 5 TABLET ORAL at 16:07

## 2024-06-04 RX ADMIN — ENOXAPARIN SODIUM 30 MG: 100 INJECTION SUBCUTANEOUS at 09:00

## 2024-06-04 RX ADMIN — ATORVASTATIN CALCIUM 40 MG: 40 TABLET, FILM COATED ORAL at 21:42

## 2024-06-04 RX ADMIN — Medication 1 CAPSULE: at 09:00

## 2024-06-04 RX ADMIN — Medication 1 G: at 09:00

## 2024-06-04 RX ADMIN — Medication 10 ML: at 21:42

## 2024-06-04 RX ADMIN — IBUPROFEN 600 MG: 600 TABLET ORAL at 12:30

## 2024-06-04 RX ADMIN — IBUPROFEN 600 MG: 600 TABLET ORAL at 17:29

## 2024-06-04 RX ADMIN — PIPERACILLIN AND TAZOBACTAM 3375 MG: 3; .375 INJECTION, POWDER, LYOPHILIZED, FOR SOLUTION INTRAVENOUS at 04:42

## 2024-06-04 RX ADMIN — ASPIRIN 81 MG: 81 TABLET, CHEWABLE ORAL at 09:00

## 2024-06-04 RX ADMIN — IBUPROFEN 600 MG: 600 TABLET ORAL at 09:00

## 2024-06-04 RX ADMIN — PIPERACILLIN AND TAZOBACTAM 3375 MG: 3; .375 INJECTION, POWDER, LYOPHILIZED, FOR SOLUTION INTRAVENOUS at 12:33

## 2024-06-04 RX ADMIN — Medication 10 ML: at 09:01

## 2024-06-04 RX ADMIN — ENOXAPARIN SODIUM 30 MG: 100 INJECTION SUBCUTANEOUS at 21:42

## 2024-06-04 RX ADMIN — OXYCODONE 10 MG: 5 TABLET ORAL at 23:03

## 2024-06-04 RX ADMIN — OXYCODONE 10 MG: 5 TABLET ORAL at 09:06

## 2024-06-04 RX ADMIN — Medication 1 G: at 16:06

## 2024-06-04 RX ADMIN — Medication 1 CAPSULE: at 16:06

## 2024-06-04 ASSESSMENT — PAIN DESCRIPTION - LOCATION
LOCATION: CHEST;OTHER (COMMENT)
LOCATION: CHEST

## 2024-06-04 ASSESSMENT — PAIN SCALES - GENERAL
PAINLEVEL_OUTOF10: 4
PAINLEVEL_OUTOF10: 8
PAINLEVEL_OUTOF10: 7
PAINLEVEL_OUTOF10: 5
PAINLEVEL_OUTOF10: 7
PAINLEVEL_OUTOF10: 4

## 2024-06-04 ASSESSMENT — PAIN DESCRIPTION - ORIENTATION
ORIENTATION: RIGHT
ORIENTATION: RIGHT

## 2024-06-04 ASSESSMENT — PAIN DESCRIPTION - DESCRIPTORS: DESCRIPTORS: ACHING

## 2024-06-04 ASSESSMENT — PAIN SCALES - WONG BAKER
WONGBAKER_NUMERICALRESPONSE: HURTS LITTLE MORE
WONGBAKER_NUMERICALRESPONSE: NO HURT

## 2024-06-04 ASSESSMENT — PAIN - FUNCTIONAL ASSESSMENT: PAIN_FUNCTIONAL_ASSESSMENT: ACTIVITIES ARE NOT PREVENTED

## 2024-06-04 NOTE — PROGRESS NOTES
Hospitalist Progress Note      PCP: Sallie Becker MD    Date of Admission: 6/1/2024    Chief Complaint: Left-sided chest pain pleuritic    Hospital Course:     Bed chest pain is improving no shortness of breath fevers chills nausea vomiting    Medications:  Reviewed      Exam:    /74   Pulse 85   Temp 98.1 °F (36.7 °C) (Oral)   Resp 18   Ht 1.651 m (5' 5\")   Wt 103.8 kg (228 lb 12.8 oz)   SpO2 94%   BMI 38.07 kg/m²       General appearance:  Appears comfortable. AAOx3  HEENT: atraumatic, Pupils equal, muscous membranes moist, no masses appreciated  Cardiovascular: Regular rate and rhythm no murmurs appreciated  Respiratory: CTAB no wheezing  Gastrointestinal: Abdomen soft, non-tender, BS+  EXT: no edema  Neurology: no gross focal deficts  Psychiatry: Appropriate affect. Not agitated  Skin: Warm, dry, no rashes appreciated        Labs:   Recent Labs     06/02/24  1025 06/03/24 0449 06/04/24  0529   WBC 16.8* 12.6* 10.7   HGB 11.8* 11.5* 9.9*   HCT 34.8* 33.0* 28.1*    379 392       Recent Labs     06/02/24  1025 06/02/24 2051 06/03/24  0448 06/04/24  0529   * 132* 133* 136   K 5.2*  --  4.0 4.1   *  --  96* 98*   CO2 23  --  24 24   BUN 24*  --  24* 20   CREATININE 0.9  --  0.9 0.8   CALCIUM 9.6  --  9.3 9.2       Recent Labs     06/01/24  1928   AST 22   ALT 15   BILITOT 0.8   ALKPHOS 44       No results for input(s): \"INR\" in the last 72 hours.  No results for input(s): \"CKTOTAL\", \"TROPONINI\" in the last 72 hours.    Urinalysis:      Lab Results   Component Value Date/Time    NITRU Negative 06/02/2024 03:15 AM    WBCUA 6 06/02/2024 03:15 AM    BACTERIA None Seen 06/02/2024 03:15 AM    RBCUA 2 06/02/2024 03:15 AM    BLOODU Negative 06/02/2024 03:15 AM    GLUCOSEU Negative 06/02/2024 03:15 AM       Radiology:  CT CHEST PULMONARY EMBOLISM W CONTRAST   Final Result   1. No saddle pulmonary embolus.  Otherwise limited exam due to suboptimal   bolus and motion artifact.   2.  Postsurgical changes from right middle and lower lobectomy with a small to   moderate right hydropneumothorax.  More focal foci of gas in the right lung   base may reflect superimposed infection in the appropriate clinical setting.   3. Subcutaneous emphysema in the anterior chest wall as well as a trace   amount of pneumoperitoneum anterior and posterior to the right liver.  These   could be postsurgical in etiology.   4. A few patchy ground-glass opacities in the right lung, nonspecific and may   be infectious or inflammatory in etiology.      RECOMMENDATIONS:   Multiple pulmonary nodules. Most significant: Right ground-glass pulmonary   nodule within the upper lobe measuring 12 mm. Per Fleischner Society   Guidelines, recommend a non-contrast Chest CT at 3-6 months. Subsequent   management based on the most suspicious nodule(s).      These guidelines do not apply to immunocompromised patients and patients with   cancer. Follow up in patients with significant comorbidities as clinically   warranted. For lung cancer screening, adhere to Lung-RADS guidelines.   Reference: Radiology. 2017; 284(1):228-43.            XR CHEST PORTABLE   Final Result   Small right pleural effusion with adjacent atelectasis.  No obvious   pneumothorax identified.         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

## 2024-06-04 NOTE — PROGRESS NOTES
Cardiothoracic Surgery Progress Note    CC: S/P robotic assisted right lower lobe with mediastinal lymph node dissection on 5/22/24 with Dr. Milan. Path demonstrated carcinoid tumor and 1 LN was positive.   POD# 13    Subjective:  Hemodynamically stable, RA, afebrile.  Alert and oriented X 3. Denies shortness of breath or chest pain.       Vital Signs:   /74   Pulse 85   Temp 98.1 °F (36.7 °C) (Oral)   Resp 18   Ht 1.651 m (5' 5\")   Wt 103.8 kg (228 lb 12.8 oz)   SpO2 94%   BMI 38.07 kg/m²         Physical Exam:   Cardiac: regular, no murmur  Lungs: Absent right lower lung sounds. Clear to auscultation on left side.  Abdomen:  BM  Vascular:  pulses all palpable   Extremities: no edema  : voiding        Labs:   CBC:   Recent Labs     06/03/24  0449 06/04/24  0529   WBC 12.6* 10.7   HGB 11.5* 9.9*   HCT 33.0* 28.1*    392     BMP:   Recent Labs     06/03/24  0448 06/04/24  0529   K 4.0 4.1   CREATININE 0.9 0.8   CALCIUM 9.3 9.2     PT/INR: No results for input(s): \"PROTIME\", \"INR\" in the last 72 hours.    Assessment/Plan:  Carcinoid Tumor s/p robotic assisted right lower lobe with mediastinal lymph node dissection on 5/22/24 with Dr. Milan   Pleuritis   Tobacco Abuse      Plan:     - Continue Ibuprofen for pleurisy   - Pulmonary onboard: appreciate support  - CXR tomorrow morning  -  ID onboard: appreciate support  - Currently on Zosyn; leukocytosis improving   - Continue IS and Acapella         Electronically signed by Junior Esquivel PA-C on 6/4/2024 at 11:21 AM

## 2024-06-04 NOTE — PROGRESS NOTES
PULMONARY AND CRITICAL CARE MEDICINE PROGRESS NOTE      SUBJECTIVE: Patient is sitting comfortably in the bed.  States improvement in right-sided chest pain.    REVIEW OF SYSTEMS:   CONSTITUTIONAL SYMPTOMS: The patient denies fever, fatigue, night sweats, weight loss or weight gain.   HEENT: No vision changes. No tinnitus, Denies sinus pain. No hoarseness, or dysphagia.   CARDIOVASCULAR: Denies chest pain, palpitation, syncope.  RESPIRATORY: Denies shortness of breath or cough.   GASTROINTESTINAL: Denies nausea, abdominal pain or change in bowel function.  SKIN: No rashes or itching.   MUSCULOSKELETAL: Denies weakness or bone pain.   NEUROLOGICAL: No headaches or seizures.     MEDICATIONS:      lactobacillus  1 capsule Oral BID WC    sodium chloride  1 g Oral BID WC    sodium chloride flush  5-40 mL IntraVENous 2 times per day    atorvastatin  40 mg Oral Nightly    enoxaparin  30 mg SubCUTAneous BID    aspirin  81 mg Oral Daily    ibuprofen  600 mg Oral TID WC    piperacillin-tazobactam  3,375 mg IntraVENous q8h      sodium chloride Stopped (06/03/24 1209)     traMADol, morphine, sodium chloride flush, sodium chloride, potassium chloride **OR** potassium alternative oral replacement **OR** potassium chloride, magnesium sulfate, ondansetron **OR** ondansetron, acetaminophen **OR** acetaminophen, polyethylene glycol, oxyCODONE, oxyCODONE     ALLERGIES:   Allergies as of 06/01/2024 - Fully Reviewed 06/01/2024   Allergen Reaction Noted    Naproxen Anaphylaxis, Hives, and Shortness Of Breath 11/30/2009        OBJECTIVE:   height is 1.651 m (5' 5\") and weight is 103.8 kg (228 lb 12.8 oz). Her oral temperature is 98.1 °F (36.7 °C). Her blood pressure is 112/74 and her pulse is 85. Her respiration is 17 and oxygen saturation is 94%.   No intake/output data recorded.     PHYSICAL EXAM:  CONSTITUTIONAL: She is a 53 y.o.-year-old who appears her stated age. She is alert and oriented x 3 and in no acute distress.    CARDIOVASCULAR: S1 S2 RRR. Without murmer  RESPIRATORY & CHEST: Decreased breath sounds in right lung fields. No wheezing, no crackles.   GASTROINTESTINAL & ABDOMEN: Soft, nontender, positive bowel sounds in all quadrants, non-distended, without hepatosplenomegaly.   GENITOURINARY: Deferred.   MUSCULOSKELETAL: No tenderness to palpation of the axial skeleton. There is no clubbing. No cyanosis. No edema of the lower extremities.   SKIN OF BODY: No rash or jaundice.   PSYCHIATRIC EVALUATION: Normal affect. Patient answers questions appropriately.   HEMATOLOGIC/LYMPHATIC/ IMMUNOLOGIC: No palpable lymphadenopathy.  NEUROLOGIC: Alert and oriented x 3.Groslly non-focal. Motor strength is 5+/5 in all muscle groups. The patient has a normal sensorium globally.      LABS:   Lab Results   Component Value Date    WBC 10.7 06/04/2024    HGB 9.9 (L) 06/04/2024    HCT 28.1 (L) 06/04/2024     06/04/2024    ALT 15 06/01/2024    AST 22 06/01/2024     06/04/2024    K 4.1 06/04/2024    CL 98 (L) 06/04/2024    CREATININE 0.8 06/04/2024    BUN 20 06/04/2024    CO2 24 06/04/2024    INR 0.98 05/22/2024       Lab Results   Component Value Date    GLUCOSE 116 (H) 06/04/2024    CALCIUM 9.2 06/04/2024     06/04/2024    K 4.1 06/04/2024    CO2 24 06/04/2024    CL 98 (L) 06/04/2024    BUN 20 06/04/2024    CREATININE 0.8 06/04/2024       Lab Results   Component Value Date    GLUCOSE 116 (H) 06/04/2024    CALCIUM 9.2 06/04/2024     06/04/2024    K 4.1 06/04/2024    CO2 24 06/04/2024    CL 98 (L) 06/04/2024    BUN 20 06/04/2024    CREATININE 0.8 06/04/2024       IMPRESSION:   Right hydropneumothorax  Right lower lobe typical carcinoid s/p RML, RLL lobectomy with mediastinal lymph node dissection  Former smoker  Centrilobular emphysema        RECOMMENDATION:   Patient presented with right-sided chest pain.  CT chest shows small to moderate right hydropneumothorax with small subcutaneous emphysema on anterior chest wall.

## 2024-06-04 NOTE — PROGRESS NOTES
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    Microbiology:        I have reviewed allavailable micro lab data and cultures    Blood culture (2/2) - collected on 6/1/2024: Negative  Chest wall wound culture  - collected on 6/1/2024: In process    Culture, Wound  Order: [id number]  Status: Preliminary result       Visible to patient: No (not released)       Next appt: 06/13/2024 at 09:00 AM in Cardiothoracic Surgery (Junior Esquivel PA-C)    Specimen Information: Chest   0 Result Notes       Component  Resulting Agency   WOUND/ABSCESS No growth to date [facility]   Gram Stain Result 1+ Epithelial Cells present  No WBC's seen  rare gram positive cocci present [facility]              Narrative  Performed by: [facility]  ORDER#: [id number]                          ORDERED BY: KENNY BOO  SOURCE: Chest                              COLLECTED:  06/03/24 16:15  ANTIBIOTICS AT [facility].:                      RECEIVED :  06/03/24 17:38      Specimen Collected: 06/03/24 16:15 EDT Last Resulted: 06/04/24 12:57 EDT               Antibiotics and immunizations:       Current antibiotics: All antibiotics and their doses were reviewed by me    Recent Abx Admin                     piperacillin-tazobactam (ZOSYN) 3,375 mg in sodium chloride 0.9 % 50 mL IVPB (mini-bag) (mg) 3,375 mg New Bag 06/04/24 4156               Imaging:    All pertinent images and reports for the current visit were reviewed by me during this visit.  I reviewed the chest x-ray/CT scan/MRI images and independently interpreted the findings and results today.    CT CHEST PULMONARY EMBOLISM W CONTRAST   Final Result   1. No saddle pulmonary embolus.  Otherwise limited exam due to suboptimal   bolus and motion artifact.   2. Postsurgical changes from right middle and lower lobectomy with a small to   moderate right hydropneumothorax.  More focal foci of gas in the right lung   base may reflect superimposed infection in the appropriate clinical setting.   3. Subcutaneous emphysema in the anterior chest wall as well as a trace   amount of pneumoperitoneum anterior and posterior to the right liver.  These   could be postsurgical in etiology.   4. A few patchy ground-glass opacities in the right lung, nonspecific and may   be infectious or inflammatory in etiology.      RECOMMENDATIONS:   Multiple pulmonary nodules. Most significant: Right ground-glass pulmonary   nodule within the upper lobe measuring 12 mm. Per Fleischner Society   Guidelines, recommend a non-contrast Chest CT at 3-6 months. Subsequent   management based on the most suspicious nodule(s).      These guidelines do not apply to immunocompromised patients and patients with   cancer. Follow up in patients with significant comorbidities as clinically   warranted. For lung cancer screening, adhere to Lung-RADS guidelines.   Reference: Radiology. 2017; 284(1):228-43.            XR CHEST PORTABLE   Final Result   Small right pleural effusion with adjacent atelectasis.  No obvious   pneumothorax identified.         XR CHEST (2 VW)    (Results Pending)       Medications: All current and past medications were reviewed.     lactobacillus  1 capsule Oral BID WC    sodium chloride  1 g Oral BID WC    sodium chloride flush  5-40 mL IntraVENous 2 times per day    atorvastatin  40 mg Oral Nightly     enoxaparin  30 mg SubCUTAneous BID    aspirin  81 mg Oral Daily    ibuprofen  600 mg Oral TID WC    piperacillin-tazobactam  3,375 mg IntraVENous q8h        sodium chloride Stopped (06/03/24 1209)       traMADol, morphine, sodium chloride flush, sodium chloride, potassium chloride **OR** potassium alternative oral replacement **OR** potassium chloride, magnesium sulfate, ondansetron **OR** ondansetron, acetaminophen **OR** acetaminophen, polyethylene glycol, oxyCODONE, oxyCODONE      Problem list:       Patient Active Problem List   Diagnosis Code    Lung nodule R91.1    Lung mass R91.8    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 Although every effort was made to ensure the accuracy of this automated transcription, some errors in transcription may have occurred inadvertently. If you may need any clarification, please do not hesitate to contact me through EPIC or at the phone number provided below with my electronic signature.  Any pictures or media included in this note were obtained after taking informed verbal consent from the patient and with their approval to include those in the patient's medical record.        Vinny Griggs MD, MPH, FACP, FIDSA  6/4/2024, 2:06 PM  [facility] Infectious Disease   [address] ([address] [place]  [place] [address]  Office: [phone]  Fax: [phone]  In-person Clinic days:  Tuesday & Thursday a.m.  Virtual clinic

## 2024-06-04 NOTE — PROGRESS NOTES
Shift assessment completed. Routine vitals stable. Scheduled medications given along with PRN roxicodone for 7/10 pain at prior chest tube site. Patient is awake, alert and oriented. Respirations are easy and unlabored. Patient does not appear to be in distress, resting comfortably at this time, sitting in chair, eating breakfast. No needs expressed. Dressing to prior chest tube site CDI. IS encouraged. Call light within reach.

## 2024-06-04 NOTE — PROGRESS NOTES
[facility]  Consultation Note    Reason for Consult: Hyponatremia  Requesting Physician:  Dr. FRANCHESCA Yost    CHIEF COMPLAINT: Chest pain    History obtained from records and patient.    HISTORY OF PRESENT ILLNESS:                Cici Hernández  is 53 y.o. y.o. female with significant past medical history of lung nodule status post mediastinal lymph node dissection on 5/23/2024, smoking who presents with chest pain after recent mediastinal lymph node dissection.  Chest pain worsens with breathing.  I am asked to see the patient since sodium was 132 on presentation, Increased to 151, in 15 hours, but repeat sodium about 10 hours after, was only 132 and currently sodium is 133.  Potassium was 5.2 but now better at 4.  Creatinine 0.9.  No polyuria.  Denies any nausea and or vomiting.  No diarrhea.    Past Medical History:     has a past medical history of Edema, Lung nodule, and Smoker.   Past Surgical History:     has a past surgical history that includes CT NEEDLE BIOPSY LUNG PERCUTANEOUS (04/16/2024); Tubal ligation; Cholecystectomy; Carpal tunnel release (Right); Nasal fracture surgery; and Lung surgery (Right, 5/22/2024).   Current Medications:    Current Facility-Administered Medications: lactobacillus (CULTURELLE) capsule 1 capsule, 1 capsule, Oral, BID WC  sodium chloride tablet 1 g, 1 g, Oral, BID WC  traMADol (ULTRAM) tablet 50 mg, 50 mg, Oral, Q6H PRN  morphine (PF) injection 2 mg, 2 mg, IntraVENous, Q4H PRN  sodium chloride flush 0.9 % injection 5-40 mL, 5-40 mL, IntraVENous, 2 times per day  sodium chloride flush 0.9 % injection 5-40 mL, 5-40 mL, IntraVENous, PRN  0.9 % sodium chloride infusion, , IntraVENous, PRN  potassium chloride (KLOR-CON M) extended release tablet 40 mEq, 40 mEq, Oral, PRN **OR** potassium bicarb-citric acid (EFFER-K) effervescent tablet 40 mEq, 40 mEq, Oral, PRN **OR** potassium chloride 10 mEq/100 mL IVPB (Peripheral Line), 10 mEq, IntraVENous,

## 2024-06-04 NOTE — PLAN OF CARE
Problem: Discharge Planning  Goal: Discharge to home or other facility with appropriate resources  6/3/2024 2314 by Annetta Walker RN  Outcome: Progressing  6/3/2024 1140 by Gladys Vale RN  Outcome: Progressing     Problem: Pain  Goal: Verbalizes/displays adequate comfort level or baseline comfort level  6/3/2024 2314 by Annetta Walker RN  Outcome: Progressing  Flowsheets (Taken 6/3/2024 1145 by Gladys Vale RN)  Verbalizes/displays adequate comfort level or baseline comfort level: Encourage patient to monitor pain and request assistance  6/3/2024 1140 by Gladys Vale RN  Outcome: Progressing     Problem: ABCDS Injury Assessment  Goal: Absence of physical injury  6/3/2024 2314 by Annetta Walker RN  Outcome: Progressing  6/3/2024 1140 by Gladys Vale RN  Outcome: Progressing

## 2024-06-05 ENCOUNTER — APPOINTMENT (OUTPATIENT)
Dept: CT IMAGING | Age: 54
DRG: 871 | End: 2024-06-05
Payer: COMMERCIAL

## 2024-06-05 ENCOUNTER — APPOINTMENT (OUTPATIENT)
Dept: GENERAL RADIOLOGY | Age: 54
DRG: 871 | End: 2024-06-05
Payer: COMMERCIAL

## 2024-06-05 LAB
ANION GAP SERPL CALCULATED.3IONS-SCNC: 18 MMOL/L (ref 3–16)
BACTERIA BLD CULT ORG #2: NORMAL
BACTERIA BLD CULT: NORMAL
BACTERIA SPEC AEROBE CULT: ABNORMAL
BUN SERPL-MCNC: 19 MG/DL (ref 7–20)
CALCIUM SERPL-MCNC: 9.2 MG/DL (ref 8.3–10.6)
CHLORIDE SERPL-SCNC: 100 MMOL/L (ref 99–110)
CO2 SERPL-SCNC: 21 MMOL/L (ref 21–32)
CREAT SERPL-MCNC: 0.8 MG/DL (ref 0.6–1.1)
GFR SERPLBLD CREATININE-BSD FMLA CKD-EPI: 86 ML/MIN/{1.73_M2}
GLUCOSE SERPL-MCNC: 160 MG/DL (ref 70–99)
GRAM STN SPEC: ABNORMAL
ORGANISM: ABNORMAL
POTASSIUM SERPL-SCNC: 3.6 MMOL/L (ref 3.5–5.1)
SODIUM SERPL-SCNC: 139 MMOL/L (ref 136–145)

## 2024-06-05 PROCEDURE — 6370000000 HC RX 637 (ALT 250 FOR IP): Performed by: INTERNAL MEDICINE

## 2024-06-05 PROCEDURE — 2580000003 HC RX 258: Performed by: INTERNAL MEDICINE

## 2024-06-05 PROCEDURE — 32551 INSERTION OF CHEST TUBE: CPT

## 2024-06-05 PROCEDURE — 0W9930Z DRAINAGE OF RIGHT PLEURAL CAVITY WITH DRAINAGE DEVICE, PERCUTANEOUS APPROACH: ICD-10-PCS | Performed by: RADIOLOGY

## 2024-06-05 PROCEDURE — 6370000000 HC RX 637 (ALT 250 FOR IP): Performed by: STUDENT IN AN ORGANIZED HEALTH CARE EDUCATION/TRAINING PROGRAM

## 2024-06-05 PROCEDURE — 99233 SBSQ HOSP IP/OBS HIGH 50: CPT | Performed by: INTERNAL MEDICINE

## 2024-06-05 PROCEDURE — 71250 CT THORAX DX C-: CPT

## 2024-06-05 PROCEDURE — 6360000002 HC RX W HCPCS: Performed by: RADIOLOGY

## 2024-06-05 PROCEDURE — 71046 X-RAY EXAM CHEST 2 VIEWS: CPT

## 2024-06-05 PROCEDURE — 80048 BASIC METABOLIC PNL TOTAL CA: CPT

## 2024-06-05 PROCEDURE — 2000000000 HC ICU R&B

## 2024-06-05 PROCEDURE — 36415 COLL VENOUS BLD VENIPUNCTURE: CPT

## 2024-06-05 PROCEDURE — 6360000002 HC RX W HCPCS: Performed by: INTERNAL MEDICINE

## 2024-06-05 RX ORDER — IBUPROFEN 400 MG/1
400 TABLET ORAL
Status: DISCONTINUED | OUTPATIENT
Start: 2024-06-05 | End: 2024-06-10 | Stop reason: HOSPADM

## 2024-06-05 RX ORDER — FENTANYL CITRATE 50 UG/ML
INJECTION, SOLUTION INTRAMUSCULAR; INTRAVENOUS PRN
Status: COMPLETED | OUTPATIENT
Start: 2024-06-05 | End: 2024-06-05

## 2024-06-05 RX ORDER — MIDAZOLAM HYDROCHLORIDE 2 MG/2ML
INJECTION, SOLUTION INTRAMUSCULAR; INTRAVENOUS PRN
Status: COMPLETED | OUTPATIENT
Start: 2024-06-05 | End: 2024-06-05

## 2024-06-05 RX ADMIN — SODIUM CHLORIDE: 9 INJECTION, SOLUTION INTRAVENOUS at 11:54

## 2024-06-05 RX ADMIN — ASPIRIN 81 MG: 81 TABLET, CHEWABLE ORAL at 08:23

## 2024-06-05 RX ADMIN — Medication 10 ML: at 19:55

## 2024-06-05 RX ADMIN — MIDAZOLAM HYDROCHLORIDE 0.5 MG: 1 INJECTION, SOLUTION INTRAMUSCULAR; INTRAVENOUS at 15:17

## 2024-06-05 RX ADMIN — Medication 10 ML: at 08:25

## 2024-06-05 RX ADMIN — FENTANYL CITRATE 25 MCG: 50 INJECTION, SOLUTION INTRAMUSCULAR; INTRAVENOUS at 15:21

## 2024-06-05 RX ADMIN — Medication 1 G: at 08:23

## 2024-06-05 RX ADMIN — MIDAZOLAM HYDROCHLORIDE 0.5 MG: 1 INJECTION, SOLUTION INTRAMUSCULAR; INTRAVENOUS at 15:24

## 2024-06-05 RX ADMIN — OXYCODONE 10 MG: 5 TABLET ORAL at 04:36

## 2024-06-05 RX ADMIN — PIPERACILLIN AND TAZOBACTAM 3375 MG: 3; .375 INJECTION, POWDER, LYOPHILIZED, FOR SOLUTION INTRAVENOUS at 04:33

## 2024-06-05 RX ADMIN — PIPERACILLIN AND TAZOBACTAM 3375 MG: 3; .375 INJECTION, POWDER, LYOPHILIZED, FOR SOLUTION INTRAVENOUS at 20:12

## 2024-06-05 RX ADMIN — OXYCODONE 10 MG: 5 TABLET ORAL at 19:55

## 2024-06-05 RX ADMIN — ATORVASTATIN CALCIUM 40 MG: 40 TABLET, FILM COATED ORAL at 19:55

## 2024-06-05 RX ADMIN — IBUPROFEN 400 MG: 400 TABLET, FILM COATED ORAL at 11:54

## 2024-06-05 RX ADMIN — IBUPROFEN 600 MG: 600 TABLET ORAL at 08:23

## 2024-06-05 RX ADMIN — SODIUM CHLORIDE: 9 INJECTION, SOLUTION INTRAVENOUS at 20:10

## 2024-06-05 RX ADMIN — MIDAZOLAM HYDROCHLORIDE 1 MG: 1 INJECTION, SOLUTION INTRAMUSCULAR; INTRAVENOUS at 15:05

## 2024-06-05 RX ADMIN — MIDAZOLAM HYDROCHLORIDE 0.5 MG: 1 INJECTION, SOLUTION INTRAMUSCULAR; INTRAVENOUS at 15:21

## 2024-06-05 RX ADMIN — PIPERACILLIN AND TAZOBACTAM 3375 MG: 3; .375 INJECTION, POWDER, LYOPHILIZED, FOR SOLUTION INTRAVENOUS at 12:02

## 2024-06-05 RX ADMIN — Medication 1 CAPSULE: at 17:57

## 2024-06-05 RX ADMIN — FENTANYL CITRATE 25 MCG: 50 INJECTION, SOLUTION INTRAMUSCULAR; INTRAVENOUS at 15:24

## 2024-06-05 RX ADMIN — SODIUM CHLORIDE, PRESERVATIVE FREE 10 ML: 5 INJECTION INTRAVENOUS at 17:58

## 2024-06-05 RX ADMIN — Medication 1 CAPSULE: at 08:23

## 2024-06-05 RX ADMIN — ENOXAPARIN SODIUM 30 MG: 100 INJECTION SUBCUTANEOUS at 19:56

## 2024-06-05 RX ADMIN — FENTANYL CITRATE 25 MCG: 50 INJECTION, SOLUTION INTRAMUSCULAR; INTRAVENOUS at 15:17

## 2024-06-05 RX ADMIN — ENOXAPARIN SODIUM 30 MG: 100 INJECTION SUBCUTANEOUS at 08:26

## 2024-06-05 RX ADMIN — IBUPROFEN 400 MG: 400 TABLET, FILM COATED ORAL at 17:57

## 2024-06-05 ASSESSMENT — PAIN SCALES - GENERAL
PAINLEVEL_OUTOF10: 4
PAINLEVEL_OUTOF10: 3
PAINLEVEL_OUTOF10: 9
PAINLEVEL_OUTOF10: 9
PAINLEVEL_OUTOF10: 7

## 2024-06-05 ASSESSMENT — PAIN DESCRIPTION - LOCATION
LOCATION: CHEST
LOCATION: CHEST

## 2024-06-05 ASSESSMENT — PAIN DESCRIPTION - DESCRIPTORS
DESCRIPTORS: DISCOMFORT

## 2024-06-05 ASSESSMENT — PAIN DESCRIPTION - PAIN TYPE: TYPE: SURGICAL PAIN

## 2024-06-05 ASSESSMENT — PAIN DESCRIPTION - FREQUENCY: FREQUENCY: INTERMITTENT

## 2024-06-05 ASSESSMENT — PAIN DESCRIPTION - ORIENTATION
ORIENTATION: RIGHT
ORIENTATION: RIGHT

## 2024-06-05 NOTE — PROGRESS NOTES
[facility] Cardiothoracic Progress Note:  Surgery: S/P robotic assisted right lower lobe with mediastinal lymph node dissection on 5/22/24 with Dr. Milan. Path demonstrated carcinoid tumor and 1 LN was positive.   POD# 14    Subjective:   Patient extremely anxious (wanting to go home) sitting up in bed on RA. Alert and oriented x 4    Vital Signs: /81   Pulse 88   Temp 97.8 °F (36.6 °C) (Oral)   Resp 16   Ht 1.651 m (5' 5\")   Wt 104.3 kg (230 lb)   SpO2 96%   BMI 38.27 kg/m²  O2 Flow Rate (L/min): 0 L/min       LABORATORY DATA:    CBC:   Recent Labs     06/03/24  0449 06/04/24  0529   WBC 12.6* 10.7   HGB 11.5* 9.9*   HCT 33.0* 28.1*   MCV 88.2 87.4    392     BMP:   Recent Labs     06/03/24  0448 06/04/24  0529 06/05/24  0933   * 136 139   K 4.0 4.1 3.6   CL 96* 98* 100   CO2 24 24 21   BUN 24* 20 19   CREATININE 0.9 0.8 0.8     MG:  No results for input(s): \"MG\" in the last 72 hours.   Cardiac Enzymes: No results for input(s): \"CKTOTAL\", \"CKMB\", \"CKMBINDEX\", \"TROPONINI\" in the last 72 hours.  PT/INR: No results for input(s): \"PROTIME\", \"INR\" in the last 72 hours.  APTT: No results for input(s): \"APTT\" in the last 72 hours.    Physicial Exam:   General appearance: anxious  Lungs: lower lung sound right side, CTA left side  Abdomen: +bowel sounds, non-tender   Heart: SR on monitor  Extremities: BLE pulses intact; moderate LE edema bilaterally   Neurological: A/O x4      Assessment  Carcinoid Tumor s/p robotic assisted right lower lobe with mediastinal lymph node dissection on 5/22/24 with Dr. Milan   Pleuritis   Tobacco Abuse  Hydropneumothorax      Today's Plan:  Repeat CT Chest   Possible pigtail placement per IR if worsening Hydropneumothorax seen on imaging  Continue Ibuprofen for pleurisy  Continue IS, acapella, ambulate in hallway  Pulmonary / ID following; we appreciate their support

## 2024-06-05 NOTE — PROGRESS NOTES
[facility]  Consultation Note    Reason for Consult: Hyponatremia  Requesting Physician:  Dr. FRANCHESCA Yost    CHIEF COMPLAINT: Chest pain    History obtained from records and patient.    HISTORY OF PRESENT ILLNESS:                Cici Hernández  is 53 y.o. y.o. female with significant past medical history of lung nodule status post mediastinal lymph node dissection on 5/23/2024, smoking who presents with chest pain after recent mediastinal lymph node dissection.  Chest pain worsens with breathing.  I am asked to see the patient since sodium was 132 on presentation, Increased to 151, in 15 hours, but repeat sodium about 10 hours after, was only 132 and currently sodium is 133.  Potassium was 5.2 but now better at 4.  Creatinine 0.9.  No polyuria.  Denies any nausea and or vomiting.  No diarrhea.    Past Medical History:     has a past medical history of Edema, Lung nodule, and Smoker.   Past Surgical History:     has a past surgical history that includes CT NEEDLE BIOPSY LUNG PERCUTANEOUS (04/16/2024); Tubal ligation; Cholecystectomy; Carpal tunnel release (Right); Nasal fracture surgery; and Lung surgery (Right, 5/22/2024).   Current Medications:    Current Facility-Administered Medications: ibuprofen (ADVIL;MOTRIN) tablet 400 mg, 400 mg, Oral, TID WC  lactobacillus (CULTURELLE) capsule 1 capsule, 1 capsule, Oral, BID WC  traMADol (ULTRAM) tablet 50 mg, 50 mg, Oral, Q6H PRN  morphine (PF) injection 2 mg, 2 mg, IntraVENous, Q4H PRN  sodium chloride flush 0.9 % injection 5-40 mL, 5-40 mL, IntraVENous, 2 times per day  sodium chloride flush 0.9 % injection 5-40 mL, 5-40 mL, IntraVENous, PRN  0.9 % sodium chloride infusion, , IntraVENous, PRN  potassium chloride (KLOR-CON M) extended release tablet 40 mEq, 40 mEq, Oral, PRN **OR** potassium bicarb-citric acid (EFFER-K) effervescent tablet 40 mEq, 40 mEq, Oral, PRN **OR** potassium chloride 10 mEq/100 mL IVPB (Peripheral Line), 10 mEq,  trachea midline, no adenopathy, thyroid symmetric, not enlarged and no tenderness, skin normal  HEMATOLOGIC/LYMPHATICS:  no cervical lymphadenopathy  LUNGS:  No increased work of breathing, good air exchange, clear to auscultation bilaterally, no crackles or wheezing  CARDIOVASCULAR:  Normal apical impulse, regular rate and rhythm, normal S1 and S2  ABDOMEN:  Normal bowel sounds, soft, non-distended, non-tender, no masses palpated, no hepatosplenomegaly  MUSCULOSKELETAL:  There is no redness, warmth, or swelling of the joints.  1+ edema      DATA:    CBC:   Lab Results   Component Value Date/Time    WBC 10.7 06/04/2024 05:29 AM    RBC 3.21 06/04/2024 05:29 AM    HGB 9.9 06/04/2024 05:29 AM    HCT 28.1 06/04/2024 05:29 AM    MCV 87.4 06/04/2024 05:29 AM    MCH 30.9 06/04/2024 05:29 AM    MCHC 35.4 06/04/2024 05:29 AM    RDW 13.2 06/04/2024 05:29 AM     06/04/2024 05:29 AM    MPV 7.9 06/04/2024 05:29 AM     BMP:   Lab Results   Component Value Date/Time     06/05/2024 09:33 AM    K 3.6 06/05/2024 09:33 AM    K 5.2 06/02/2024 10:25 AM     06/05/2024 09:33 AM    CO2 21 06/05/2024 09:33 AM    BUN 19 06/05/2024 09:33 AM    CREATININE 0.8 06/05/2024 09:33 AM    CALCIUM 9.2 06/05/2024 09:33 AM    LABGLOM 86 06/05/2024 09:33 AM    LABGLOM 87 04/16/2024 08:15 AM    GLUCOSE 160 06/05/2024 09:33 AM   Magnesium:  No results found for: \"MG\"Phosphorus:  No results found for: \"PHOS\"    Albumin 4.2  Urine osm 611  Magalys less than 20    IMPRESSION/RECOMMENDATIONS:    1.  Hyponatremia-sodium was 141 on 5/25/2024.  Appears euvolemic.  No neurologic symptoms.  Better.  Stop NaCl tabs twice daily.  Sodium of 151 probably not accurate.  Lower Magalys point towards intravascular volume depletion.   2.  Renal function WNL  3.  BP acceptable  4.  Status post mediastinal lymph node dissection for Carcinoid tumor-CTS following  5.  Chest pain-management as per Medicine  6.  History of smoking  7.  Stable renal status.  Minimize

## 2024-06-05 NOTE — ADT AUTH CERT
Complaint: Left-sided chest pain pleuritic     Hospital Course:   Some left-sided chest pain on deep breaths but much better as compared to yesterday  Not requiring any oxygen  White cell count down to 16 next        Medications:  Reviewed        Exam:     /77   Pulse 93   Temp 98.3 °F (36.8 °C) (Temporal)   Resp 15   Ht 1.651 m (5' 5\")   Wt 102.9 kg (226 lb 14.4 oz)   SpO2 93%   BMI 37.76 kg/m²      General appearance: No apparent distress, appears stated age and cooperative.  HEENT: Pupils equal, round, and reactive to light. Conjunctivae/corneas clear.  Neck: Supple, with full range of motion. No jugular venous distention. Trachea midline.  Respiratory: Decreased air entry at right lung mid and bases  Cardiovascular: Regular rate and rhythm with normal S1/S2 without MURMURS, rubs or gallops.  Abdomen: Soft, non-tender, non-distended with normal bowel sounds.  Musculoskeletal: No clubbing, cyanosis or EDEMA bilaterally.  Full range of motion without deformity.  Skin: Skin color, texture, turgor normal.  No rashes or lesions.  Neurologic:  Neurovascularly intact without any focal sensory/motor deficits. Cranial nerves: II-XII intact, grossly non-focal.        Labs:         Recent Labs     06/01/24 1928 06/02/24  0500 06/02/24  1025   WBC 20.4* 19.3* 16.8*   HGB 12.5 11.1* 11.8*   HCT 36.2 33.5* 34.8*    394 420           Recent Labs     06/01/24 1928 06/02/24  1025   * 151*   K 4.7 5.2*   CL 93* 111*   CO2 22 23   BUN 19 24*   CREATININE 0.7 0.9   CALCIUM 9.7 9.6          Recent Labs     06/01/24 1928   AST 22   ALT 15   BILITOT 0.8   ALKPHOS 44      No results for input(s): \"INR\" in the last 72 hours.  No results for input(s): \"CKTOTAL\", \"TROPONINI\" in the last 72 hours.     Urinalysis:            Lab Results   Component Value Date/Time     NITRU Negative 06/02/2024 03:15 AM     WBCUA 6 06/02/2024 03:15 AM     BACTERIA None Seen 06/02/2024 03:15 AM     RBCUA 2 06/02/2024 03:15 AM      Physician   Filed: 6/2/2024 11:05 AM Date of Service: 6/2/2024 10:53 AM Status: Signed   : Amada Bonds MD (Physician)   Consult Orders   1. Inpatient consult to Cardiothoracic Surgery [[id number]] ordered by Arsh Lemus DO at 06/01/24 2153     Expand All Collapse All                                   Cardiothoracic Surgery Consultation         6/2/2024 10:53 AM  Surgeon: Amada Bonds MD     Chief complaint: chest pain     HPI:     Ms. Hernández is a 53 y.o. y.o. female with PMHx of active tobacco use, with a 1.2 x 1 cm to 1.5 x 1.2 cm carcinoid tumor s/p robotic assisted right lower lobe with mediastinal lymph node dissection on 5/22/24 with Dr. Milan. After an expected post-op recovery course, she was discharge to home Friday 5/31/2024. She presented to ED last night with uncontrolled chest pain. Lab workup revealed WBC 20 and elevated CRP, but no elev troponin, BNP. UA without findings of UTI. CT PE protocol demonstrated an expected amount of air and pleural fluid in the R chest, with some subq emphysema (recent CT pull). She was admitted for NSAID treatment for suspected pleuritis/poss pericarditis, and started on broad spectrum Abx. Continues to be afebrile, and WBC count decreasing (16 today)     Review of Systems:  Negative expect as stated above in the HPI     Past Medical History:    Past Medical History            Diagnosis Date    Edema       left leg    Lung nodule       right    Smoker              Past Surgical History:    Past Surgical History             Procedure Laterality Date    CARPAL TUNNEL RELEASE Right      CHOLECYSTECTOMY         with hiatal hernia rep    CT NEEDLE BIOPSY LUNG PERCUTANEOUS   04/16/2024     CT NEEDLE BIOPSY LUNG PERCUTANEOUS 4/16/2024 [facility] CT SCAN    LUNG SURGERY Right 5/22/2024     ROBOTIC ASSISTED RIGHT LOWER LOBE LOBECTOMY WITH MEDIASTINAL LYMPH NODE DISSECTION performed by Sonya Milan MD at [facility] OR    NASAL FRACTURE SURGERY         closed reduction     capsule 1 capsule Oral Given Martha Brito RN    06/03/2024 1610 EDT lactobacillus (CULTURELLE) capsule 1 capsule 1 capsule Oral Given Gladys Vale RN    06/03/2024 1201 EDT lactobacillus (CULTURELLE) capsule 1 capsule 1 capsule Oral Given Gladys Vale RN    06/05/2024 0823 EDT sodium chloride tablet 1 g 1 g Oral Given Gladys Vale RN    06/04/2024 1606 EDT sodium chloride tablet 1 g 1 g Oral Given Martha Brito RN    06/04/2024 0900 EDT sodium chloride tablet 1 g 1 g Oral Given Martha Brito RN    06/03/2024 1611 EDT sodium chloride tablet 1 g 1 g Oral Given Gladys Vale RN

## 2024-06-05 NOTE — PROGRESS NOTES
Morning assessment and medications complete, pt cooperated and tolerated well. Medications given per MAR. VS stable. A&O X4, Pt states pain 3/10 but wants to hold off on meds. Patient clean and dry- ambulates independently in room, tolerates well. Tele monitor on. Breakfast tray set up. Bed side table, call light and belongings within reach. Bed locked and in lowest position. All questions and concerns addressed, no further needs at this time.

## 2024-06-05 NOTE — PROGRESS NOTES
Hospitalist Progress Note      PCP: Sallie Becker MD    Date of Admission: 6/1/2024    Chief Complaint: Left-sided chest pain pleuritic    Hospital Course:   Patient lying bed chest pain has improved shortness of breath improved repeat x-ray shows increasing pneumothorax    Medications:  Reviewed      Exam:    /81   Pulse 88   Temp 97.8 °F (36.6 °C) (Oral)   Resp 16   Ht 1.651 m (5' 5\")   Wt 104.3 kg (230 lb)   SpO2 96%   BMI 38.27 kg/m²       General appearance:  Appears comfortable. AAOx3  HEENT: atraumatic, Pupils equal, muscous membranes moist, no masses appreciated  Cardiovascular: Regular rate and rhythm no murmurs appreciated  Respiratory: CTAB no wheezing  Gastrointestinal: Abdomen soft, non-tender, BS+  EXT: no edema  Neurology: no gross focal deficts  Psychiatry: Appropriate affect. Not agitated  Skin: Warm, dry, no rashes appreciated        Labs:   Recent Labs     06/03/24  0449 06/04/24  0529   WBC 12.6* 10.7   HGB 11.5* 9.9*   HCT 33.0* 28.1*    392       Recent Labs     06/03/24  0448 06/04/24  0529 06/05/24  0933   * 136 139   K 4.0 4.1 3.6   CL 96* 98* 100   CO2 24 24 21   BUN 24* 20 19   CREATININE 0.9 0.8 0.8   CALCIUM 9.3 9.2 9.2       No results for input(s): \"AST\", \"ALT\", \"BILIDIR\", \"BILITOT\", \"ALKPHOS\" in the last 72 hours.    No results for input(s): \"INR\" in the last 72 hours.  No results for input(s): \"CKTOTAL\", \"TROPONINI\" in the last 72 hours.    Urinalysis:      Lab Results   Component Value Date/Time    NITRU Negative 06/02/2024 03:15 AM    WBCUA 6 06/02/2024 03:15 AM    BACTERIA None Seen 06/02/2024 03:15 AM    RBCUA 2 06/02/2024 03:15 AM    BLOODU Negative 06/02/2024 03:15 AM    GLUCOSEU Negative 06/02/2024 03:15 AM       Radiology:  XR CHEST (2 VW)   Final Result   Increasing small right apical pneumothorax with unchanged right effusion.         CT CHEST PULMONARY EMBOLISM W CONTRAST   Final Result   1. No saddle pulmonary embolus.  Otherwise

## 2024-06-05 NOTE — PROGRESS NOTES
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    Microbiology:        I have reviewed allavailable micro lab data and cultures    Blood culture (2/2) - collected on 6/1/2024: Negative  Chest wall wound culture  - collected on 6/1/2024: In process    Culture, Wound  Order: [id number]  Status: Preliminary result       Visible to patient: No (not released)       Next appt: 06/13/2024 at 09:00 AM in Cardiothoracic Surgery (Junior Esquivel PA-C)    Specimen Information: Chest   0 Result Notes       Component  Resulting Agency   WOUND/ABSCESS No growth to date P [facility]   Gram Stain Result 1+ Epithelial Cells present  No WBC's seen  rare gram positive cocci present [facility]              Narrative  Performed by: [facility]  ORDER#: [id number]                          ORDERED BY: EKNNY BOO  SOURCE: Chest                              COLLECTED:  06/03/24 16:15  ANTIBIOTICS AT [facility].:                      RECEIVED :  06/03/24 17:38      Specimen Collected: 06/03/24 16:15 EDT Last Resulted: 06/04/24 12:57 EDT               Antibiotics and immunizations:       Current antibiotics: All antibiotics and their doses were reviewed by me    Recent Abx Admin                     piperacillin-tazobactam (ZOSYN) 3,375 mg in sodium chloride 0.9 % 50 mL IVPB (mini-bag) (mg) 3,375 mg New Bag 06/05/24 2012     suspicious nodule(s).      These guidelines do not apply to immunocompromised patients and patients with   cancer. Follow up in patients with significant comorbidities as clinically   warranted. For lung cancer screening, adhere to Lung-RADS guidelines.   Reference: Radiology. 2017; 284(1):228-43.            XR CHEST PORTABLE   Final Result   Small right pleural effusion with adjacent atelectasis.  No obvious   pneumothorax identified.         XR CHEST PORTABLE    (Results Pending)       Medications: All current and past medications were reviewed.     ibuprofen  400 mg Oral TID WC    lactobacillus  1 capsule Oral BID WC    sodium chloride flush  5-40 mL IntraVENous 2 times per day    atorvastatin  40 mg Oral Nightly    enoxaparin  30 mg SubCUTAneous BID    aspirin  81 mg Oral Daily    piperacillin-tazobactam  3,375 mg IntraVENous q8h        sodium chloride 10 mL/hr at 06/05/24 2010       traMADol, morphine, sodium chloride flush, sodium chloride, potassium chloride **OR** potassium alternative oral replacement **OR** potassium chloride, magnesium sulfate, ondansetron **OR** ondansetron, acetaminophen **OR** acetaminophen, polyethylene glycol, oxyCODONE, oxyCODONE      Problem list:       Patient Active Problem List   Diagnosis Code    Lung nodule R91.1    Lung mass R91.8    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 Although every effort was made to ensure the accuracy of this

## 2024-06-05 NOTE — PRE SEDATION
Sedation Pre-Procedure Note    Patient Name: Cici Hernández   YOB: 1970  Room/Bed: 3AN-3304/3304-01  Medical Record Number: [id number]  Date: 6/5/2024   Time: 2:57 PM       Indication:  Right chest tube placement.    Consent: I have discussed with the patient and/or the patient representative the indication, alternatives, and the possible risks and/or complications of the planned procedure and the anesthesia methods. The patient and/or patient representative appear to understand and agree to proceed.    Vital Signs:   Vitals:    06/05/24 0815   BP: 124/81   Pulse: 88   Resp: 16   Temp: 97.8 °F (36.6 °C)   SpO2: 96%       Past Medical History:   has a past medical history of Edema, Lung nodule, and Smoker.    Past Surgical History:   has a past surgical history that includes CT NEEDLE BIOPSY LUNG PERCUTANEOUS (04/16/2024); Tubal ligation; Cholecystectomy; Carpal tunnel release (Right); Nasal fracture surgery; and Lung surgery (Right, 5/22/2024).    Medications:   Scheduled Meds:    ibuprofen  400 mg Oral TID WC    lactobacillus  1 capsule Oral BID WC    sodium chloride flush  5-40 mL IntraVENous 2 times per day    atorvastatin  40 mg Oral Nightly    enoxaparin  30 mg SubCUTAneous BID    aspirin  81 mg Oral Daily    piperacillin-tazobactam  3,375 mg IntraVENous q8h     Continuous Infusions:    sodium chloride 20 mL/hr at 06/05/24 1154     PRN Meds: traMADol, morphine, sodium chloride flush, sodium chloride, potassium chloride **OR** potassium alternative oral replacement **OR** potassium chloride, magnesium sulfate, ondansetron **OR** ondansetron, acetaminophen **OR** acetaminophen, polyethylene glycol, oxyCODONE, oxyCODONE  Home Meds:   Prior to Admission medications    Medication Sig Start Date End Date Taking? Authorizing Provider   traMADol (ULTRAM) 50 MG tablet Take 1 tablet by mouth every 6 hours as needed for Pain for up to 7 days. Max Daily Amount: 200 mg 5/31/24 6/7/24  Junior Esquivel PA-C

## 2024-06-05 NOTE — PROGRESS NOTES
PULMONARY AND CRITICAL CARE MEDICINE PROGRESS NOTE      SUBJECTIVE: Patient states improved right-sided chest pain.  No cough or shortness of breath.    REVIEW OF SYSTEMS:   CONSTITUTIONAL SYMPTOMS: The patient denies fever, fatigue, night sweats, weight loss or weight gain.   HEENT: No vision changes. No tinnitus, Denies sinus pain. No hoarseness, or dysphagia.   CARDIOVASCULAR: Denies chest pain, palpitation, syncope.  RESPIRATORY: See above.   GASTROINTESTINAL: Denies nausea, abdominal pain or change in bowel function.  SKIN: No rashes or itching.   MUSCULOSKELETAL: Denies weakness or bone pain.   NEUROLOGICAL: No headaches or seizures.     MEDICATIONS:      ibuprofen  400 mg Oral TID WC    lactobacillus  1 capsule Oral BID WC    sodium chloride  1 g Oral BID WC    sodium chloride flush  5-40 mL IntraVENous 2 times per day    atorvastatin  40 mg Oral Nightly    enoxaparin  30 mg SubCUTAneous BID    aspirin  81 mg Oral Daily    piperacillin-tazobactam  3,375 mg IntraVENous q8h      sodium chloride Stopped (06/03/24 1209)     traMADol, morphine, sodium chloride flush, sodium chloride, potassium chloride **OR** potassium alternative oral replacement **OR** potassium chloride, magnesium sulfate, ondansetron **OR** ondansetron, acetaminophen **OR** acetaminophen, polyethylene glycol, oxyCODONE, oxyCODONE     ALLERGIES:   Allergies as of 06/01/2024 - Fully Reviewed 06/01/2024   Allergen Reaction Noted    Naproxen Anaphylaxis, Hives, and Shortness Of Breath 11/30/2009        OBJECTIVE:   height is 1.651 m (5' 5\") and weight is 104.3 kg (230 lb). Her oral temperature is 97.8 °F (36.6 °C). Her blood pressure is 124/81 and her pulse is 88. Her respiration is 16 and oxygen saturation is 96%.   I/O this shift:  In: 10 [I.V.:10]  Out: -      PHYSICAL EXAM:  CONSTITUTIONAL: She is a 53 y.o.-year-old who appears her stated age. She is alert and oriented x 3 and in no acute distress.   CARDIOVASCULAR: S1 S2 RRR. Without  murmer  RESPIRATORY & CHEST: L decreased breath sounds in right lung fields. No wheezing, no crackles.   GASTROINTESTINAL & ABDOMEN: Soft, nontender, positive bowel sounds in all quadrants, non-distended, without hepatosplenomegaly.   GENITOURINARY: Deferred.   MUSCULOSKELETAL: No tenderness to palpation of the axial skeleton. There is no clubbing. No cyanosis. No edema of the lower extremities.   SKIN OF BODY: No rash or jaundice.   PSYCHIATRIC EVALUATION: Normal affect. Patient answers questions appropriately.   HEMATOLOGIC/LYMPHATIC/ IMMUNOLOGIC: No palpable lymphadenopathy.  NEUROLOGIC: Alert and oriented x 3.Groslly non-focal. Motor strength is 5+/5 in all muscle groups. The patient has a normal sensorium globally.      LABS:   Lab Results   Component Value Date    WBC 10.7 06/04/2024    HGB 9.9 (L) 06/04/2024    HCT 28.1 (L) 06/04/2024     06/04/2024    ALT 15 06/01/2024    AST 22 06/01/2024     06/05/2024    K 3.6 06/05/2024     06/05/2024    CREATININE 0.8 06/05/2024    BUN 19 06/05/2024    CO2 21 06/05/2024    INR 0.98 05/22/2024       Lab Results   Component Value Date    GLUCOSE 160 (H) 06/05/2024    CALCIUM 9.2 06/05/2024     06/05/2024    K 3.6 06/05/2024    CO2 21 06/05/2024     06/05/2024    BUN 19 06/05/2024    CREATININE 0.8 06/05/2024       Lab Results   Component Value Date    GLUCOSE 160 (H) 06/05/2024    CALCIUM 9.2 06/05/2024     06/05/2024    K 3.6 06/05/2024    CO2 21 06/05/2024     06/05/2024    BUN 19 06/05/2024    CREATININE 0.8 06/05/2024       IMPRESSION:   Right hydropneumothorax  Right lower lobe typical carcinoid s/p RML, RLL lobectomy with mediastinal lymph node dissection  Former smoker  Centrilobular emphysema        RECOMMENDATION:   Patient presented with right-sided chest pain.  CT chest shows small to moderate right hydropneumothorax with small subcutaneous emphysema on anterior chest wall.  Foci of gas was seen in right lower lobe

## 2024-06-05 NOTE — BRIEF OP NOTE
Brief Postoperative Note    Cici Hernández  YOB: 1970  [id number]    Pre-operative Diagnosis: right hydropneumothorax    Post-operative Diagnosis: Same    Procedure: CT-guided right chest tube placement    Anesthesia: Moderate Sedation    Surgeons: Ishmael Gerardo MD    Estimated Blood Loss: Less than 5 mL    Complications: None    Specimens: Was Not Obtained    Findings: Successful placement of a right 10F chest tube.    Electronically signed by Ishmael Gerardo MD on 6/5/2024 at 3:35 PM

## 2024-06-05 NOTE — PROGRESS NOTES
Dr Mcgarry at bedside, ordered continuous wall suction of R sided chest tube and follow up chest xray for 6/6 am.

## 2024-06-05 NOTE — PLAN OF CARE
Problem: Discharge Planning  Goal: Discharge to home or other facility with appropriate resources  Outcome: Progressing     Problem: Pain  Goal: Verbalizes/displays adequate comfort level or baseline comfort level  Outcome: Progressing     Problem: ABCDS Injury Assessment  Goal: Absence of physical injury  Outcome: Progressing  Flowsheets (Taken 6/5/2024 2887)  Absence of Physical Injury: Implement safety measures based on patient assessment

## 2024-06-06 ENCOUNTER — APPOINTMENT (OUTPATIENT)
Dept: GENERAL RADIOLOGY | Age: 54
DRG: 871 | End: 2024-06-06
Payer: COMMERCIAL

## 2024-06-06 LAB
ANION GAP SERPL CALCULATED.3IONS-SCNC: 14 MMOL/L (ref 3–16)
BASOPHILS # BLD: 0.1 K/UL (ref 0–0.2)
BASOPHILS NFR BLD: 0.5 %
BUN SERPL-MCNC: 16 MG/DL (ref 7–20)
CALCIUM SERPL-MCNC: 9.2 MG/DL (ref 8.3–10.6)
CHLORIDE SERPL-SCNC: 99 MMOL/L (ref 99–110)
CO2 SERPL-SCNC: 23 MMOL/L (ref 21–32)
CREAT SERPL-MCNC: 0.6 MG/DL (ref 0.6–1.1)
DEPRECATED RDW RBC AUTO: 13.5 % (ref 12.4–15.4)
EOSINOPHIL # BLD: 0.3 K/UL (ref 0–0.6)
EOSINOPHIL NFR BLD: 3 %
GFR SERPLBLD CREATININE-BSD FMLA CKD-EPI: >90 ML/MIN/{1.73_M2}
GLUCOSE SERPL-MCNC: 117 MG/DL (ref 70–99)
HCT VFR BLD AUTO: 28.6 % (ref 36–48)
HGB BLD-MCNC: 10 G/DL (ref 12–16)
LYMPHOCYTES # BLD: 1.1 K/UL (ref 1–5.1)
LYMPHOCYTES NFR BLD: 10.5 %
MCH RBC QN AUTO: 30.3 PG (ref 26–34)
MCHC RBC AUTO-ENTMCNC: 35 G/DL (ref 31–36)
MCV RBC AUTO: 86.6 FL (ref 80–100)
MONOCYTES # BLD: 1.1 K/UL (ref 0–1.3)
MONOCYTES NFR BLD: 10.7 %
NEUTROPHILS # BLD: 7.9 K/UL (ref 1.7–7.7)
NEUTROPHILS NFR BLD: 75.3 %
PLATELET # BLD AUTO: 473 K/UL (ref 135–450)
PMV BLD AUTO: 7.2 FL (ref 5–10.5)
POTASSIUM SERPL-SCNC: 3.7 MMOL/L (ref 3.5–5.1)
RBC # BLD AUTO: 3.3 M/UL (ref 4–5.2)
SODIUM SERPL-SCNC: 136 MMOL/L (ref 136–145)
WBC # BLD AUTO: 10.5 K/UL (ref 4–11)

## 2024-06-06 PROCEDURE — 6370000000 HC RX 637 (ALT 250 FOR IP): Performed by: INTERNAL MEDICINE

## 2024-06-06 PROCEDURE — 99024 POSTOP FOLLOW-UP VISIT: CPT

## 2024-06-06 PROCEDURE — 71045 X-RAY EXAM CHEST 1 VIEW: CPT

## 2024-06-06 PROCEDURE — 6370000000 HC RX 637 (ALT 250 FOR IP): Performed by: THORACIC SURGERY (CARDIOTHORACIC VASCULAR SURGERY)

## 2024-06-06 PROCEDURE — 80048 BASIC METABOLIC PNL TOTAL CA: CPT

## 2024-06-06 PROCEDURE — 94761 N-INVAS EAR/PLS OXIMETRY MLT: CPT

## 2024-06-06 PROCEDURE — 6370000000 HC RX 637 (ALT 250 FOR IP)

## 2024-06-06 PROCEDURE — 85025 COMPLETE CBC W/AUTO DIFF WBC: CPT

## 2024-06-06 PROCEDURE — 99233 SBSQ HOSP IP/OBS HIGH 50: CPT | Performed by: INTERNAL MEDICINE

## 2024-06-06 PROCEDURE — 6360000002 HC RX W HCPCS: Performed by: INTERNAL MEDICINE

## 2024-06-06 PROCEDURE — 94640 AIRWAY INHALATION TREATMENT: CPT

## 2024-06-06 PROCEDURE — 6370000000 HC RX 637 (ALT 250 FOR IP): Performed by: STUDENT IN AN ORGANIZED HEALTH CARE EDUCATION/TRAINING PROGRAM

## 2024-06-06 PROCEDURE — 2000000000 HC ICU R&B

## 2024-06-06 PROCEDURE — 2580000003 HC RX 258: Performed by: INTERNAL MEDICINE

## 2024-06-06 PROCEDURE — 6360000002 HC RX W HCPCS

## 2024-06-06 RX ORDER — POLYETHYLENE GLYCOL 3350 17 G/17G
17 POWDER, FOR SOLUTION ORAL DAILY PRN
Status: DISCONTINUED | OUTPATIENT
Start: 2024-06-06 | End: 2024-06-06 | Stop reason: SDUPTHER

## 2024-06-06 RX ORDER — IPRATROPIUM BROMIDE AND ALBUTEROL SULFATE 2.5; .5 MG/3ML; MG/3ML
1 SOLUTION RESPIRATORY (INHALATION)
Status: DISCONTINUED | OUTPATIENT
Start: 2024-06-06 | End: 2024-06-09

## 2024-06-06 RX ORDER — KETOROLAC TROMETHAMINE 30 MG/ML
30 INJECTION, SOLUTION INTRAMUSCULAR; INTRAVENOUS EVERY 6 HOURS PRN
Status: DISCONTINUED | OUTPATIENT
Start: 2024-06-06 | End: 2024-06-10 | Stop reason: HOSPADM

## 2024-06-06 RX ORDER — SENNOSIDES A AND B 8.6 MG/1
1 TABLET, FILM COATED ORAL NIGHTLY PRN
Status: DISCONTINUED | OUTPATIENT
Start: 2024-06-06 | End: 2024-06-10 | Stop reason: HOSPADM

## 2024-06-06 RX ORDER — GABAPENTIN 300 MG/1
300 CAPSULE ORAL 3 TIMES DAILY
Status: DISCONTINUED | OUTPATIENT
Start: 2024-06-06 | End: 2024-06-10 | Stop reason: HOSPADM

## 2024-06-06 RX ORDER — LACTULOSE 10 G/15ML
20 SOLUTION ORAL 3 TIMES DAILY
Status: DISCONTINUED | OUTPATIENT
Start: 2024-06-06 | End: 2024-06-07

## 2024-06-06 RX ORDER — FERROUS SULFATE 325(65) MG
325 TABLET ORAL
Status: DISCONTINUED | OUTPATIENT
Start: 2024-06-06 | End: 2024-06-10 | Stop reason: HOSPADM

## 2024-06-06 RX ADMIN — SODIUM CHLORIDE: 9 INJECTION, SOLUTION INTRAVENOUS at 21:01

## 2024-06-06 RX ADMIN — PIPERACILLIN AND TAZOBACTAM 3375 MG: 3; .375 INJECTION, POWDER, LYOPHILIZED, FOR SOLUTION INTRAVENOUS at 04:12

## 2024-06-06 RX ADMIN — TRAMADOL HYDROCHLORIDE 50 MG: 50 TABLET ORAL at 11:38

## 2024-06-06 RX ADMIN — PIPERACILLIN AND TAZOBACTAM 3375 MG: 3; .375 INJECTION, POWDER, LYOPHILIZED, FOR SOLUTION INTRAVENOUS at 21:02

## 2024-06-06 RX ADMIN — FERROUS SULFATE TAB 325 MG (65 MG ELEMENTAL FE) 325 MG: 325 (65 FE) TAB at 11:33

## 2024-06-06 RX ADMIN — GABAPENTIN 300 MG: 300 CAPSULE ORAL at 14:14

## 2024-06-06 RX ADMIN — FERROUS SULFATE TAB 325 MG (65 MG ELEMENTAL FE) 325 MG: 325 (65 FE) TAB at 16:06

## 2024-06-06 RX ADMIN — GABAPENTIN 300 MG: 300 CAPSULE ORAL at 21:05

## 2024-06-06 RX ADMIN — TRAMADOL HYDROCHLORIDE 50 MG: 50 TABLET ORAL at 23:00

## 2024-06-06 RX ADMIN — OXYCODONE 10 MG: 5 TABLET ORAL at 02:17

## 2024-06-06 RX ADMIN — PIPERACILLIN AND TAZOBACTAM 3375 MG: 3; .375 INJECTION, POWDER, LYOPHILIZED, FOR SOLUTION INTRAVENOUS at 11:36

## 2024-06-06 RX ADMIN — MORPHINE SULFATE 2 MG: 2 INJECTION, SOLUTION INTRAMUSCULAR; INTRAVENOUS at 07:34

## 2024-06-06 RX ADMIN — ENOXAPARIN SODIUM 30 MG: 100 INJECTION SUBCUTANEOUS at 21:06

## 2024-06-06 RX ADMIN — ASPIRIN 81 MG: 81 TABLET, CHEWABLE ORAL at 09:16

## 2024-06-06 RX ADMIN — IPRATROPIUM BROMIDE AND ALBUTEROL SULFATE 1 DOSE: .5; 3 SOLUTION RESPIRATORY (INHALATION) at 20:45

## 2024-06-06 RX ADMIN — FERROUS SULFATE TAB 325 MG (65 MG ELEMENTAL FE) 325 MG: 325 (65 FE) TAB at 09:16

## 2024-06-06 RX ADMIN — IBUPROFEN 400 MG: 400 TABLET, FILM COATED ORAL at 09:19

## 2024-06-06 RX ADMIN — IBUPROFEN 400 MG: 400 TABLET, FILM COATED ORAL at 11:33

## 2024-06-06 RX ADMIN — GABAPENTIN 300 MG: 300 CAPSULE ORAL at 09:16

## 2024-06-06 RX ADMIN — Medication 10 ML: at 09:16

## 2024-06-06 RX ADMIN — ATORVASTATIN CALCIUM 40 MG: 40 TABLET, FILM COATED ORAL at 21:06

## 2024-06-06 RX ADMIN — IPRATROPIUM BROMIDE AND ALBUTEROL SULFATE 1 DOSE: .5; 3 SOLUTION RESPIRATORY (INHALATION) at 13:26

## 2024-06-06 RX ADMIN — KETOROLAC TROMETHAMINE 30 MG: 30 INJECTION, SOLUTION INTRAMUSCULAR at 21:17

## 2024-06-06 RX ADMIN — Medication 1 CAPSULE: at 09:19

## 2024-06-06 RX ADMIN — ENOXAPARIN SODIUM 30 MG: 100 INJECTION SUBCUTANEOUS at 09:16

## 2024-06-06 RX ADMIN — IBUPROFEN 400 MG: 400 TABLET, FILM COATED ORAL at 16:06

## 2024-06-06 RX ADMIN — Medication 1 CAPSULE: at 16:06

## 2024-06-06 ASSESSMENT — PAIN DESCRIPTION - DESCRIPTORS
DESCRIPTORS: ACHING
DESCRIPTORS: ACHING
DESCRIPTORS: SHARP;NAGGING
DESCRIPTORS: DISCOMFORT
DESCRIPTORS: ACHING

## 2024-06-06 ASSESSMENT — PAIN DESCRIPTION - PAIN TYPE: TYPE: SURGICAL PAIN

## 2024-06-06 ASSESSMENT — PAIN DESCRIPTION - ONSET: ONSET: ON-GOING

## 2024-06-06 ASSESSMENT — PAIN SCALES - GENERAL
PAINLEVEL_OUTOF10: 4
PAINLEVEL_OUTOF10: 5
PAINLEVEL_OUTOF10: 4
PAINLEVEL_OUTOF10: 5
PAINLEVEL_OUTOF10: 7
PAINLEVEL_OUTOF10: 0
PAINLEVEL_OUTOF10: 6
PAINLEVEL_OUTOF10: 8
PAINLEVEL_OUTOF10: 2

## 2024-06-06 ASSESSMENT — PAIN DESCRIPTION - LOCATION
LOCATION: CHEST

## 2024-06-06 ASSESSMENT — PAIN DESCRIPTION - ORIENTATION
ORIENTATION: MID
ORIENTATION: RIGHT
ORIENTATION: MID
ORIENTATION: RIGHT
ORIENTATION: RIGHT

## 2024-06-06 ASSESSMENT — PAIN DESCRIPTION - FREQUENCY: FREQUENCY: CONTINUOUS

## 2024-06-06 NOTE — PROGRESS NOTES
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    Microbiology:        I have reviewed allavailable micro lab data and cultures    Blood culture (2/2) - collected on 6/1/2024: Negative  Chest wall wound culture  - collected on 6/1/2024: In process    Culture, Wound  Order: [id number]  Status: Preliminary result       Visible to patient: No (not released)       Next appt: 06/13/2024 at 09:00 AM in Cardiothoracic Surgery (Junior Esquviel PA-C)    Specimen Information: Chest   0 Result Notes       Component  Resulting Agency   WOUND/ABSCESS No growth to date P [facility]   Gram Stain Result 1+ Epithelial Cells present  No WBC's seen  rare gram positive cocci present [facility]              Narrative  Performed by: [facility]  ORDER#: [id number]                          ORDERED BY: KENNY BOO  SOURCE: Chest                              COLLECTED:  06/03/24 16:15  ANTIBIOTICS AT [facility].:                      RECEIVED :  06/03/24 17:38      Specimen Collected: 06/03/24 16:15 EDT Last Resulted: 06/04/24 12:57 EDT               Antibiotics and immunizations:       Current antibiotics: All antibiotics and their doses were reviewed by me    Recent Abx Admin                     piperacillin-tazobactam (ZOSYN) 3,375 mg in sodium chloride 0.9 % 50 mL IVPB (mini-bag) (mg) 3,375 mg New Bag 06/06/24 6496      apply to immunocompromised patients and patients with   cancer. Follow up in patients with significant comorbidities as clinically   warranted. For lung cancer screening, adhere to Lung-RADS guidelines.   Reference: Radiology. 2017; 284(1):228-43.            XR CHEST PORTABLE   Final Result   Small right pleural effusion with adjacent atelectasis.  No obvious   pneumothorax identified.         XR CHEST PORTABLE    (Results Pending)       Medications: All current and past medications were reviewed.     ferrous sulfate  325 mg Oral TID WC    lactulose  20 g Oral TID    ipratropium 0.5 mg-albuterol 2.5 mg  1 Dose Inhalation Q4H WA RT    gabapentin  300 mg Oral TID    ibuprofen  400 mg Oral TID WC    lactobacillus  1 capsule Oral BID WC    sodium chloride flush  5-40 mL IntraVENous 2 times per day    atorvastatin  40 mg Oral Nightly    enoxaparin  30 mg SubCUTAneous BID    aspirin  81 mg Oral Daily    piperacillin-tazobactam  3,375 mg IntraVENous q8h        sodium chloride 10 mL/hr at 06/06/24 2101       senna, ketorolac, traMADol, morphine, sodium chloride flush, sodium chloride, potassium chloride **OR** potassium alternative oral replacement **OR** potassium chloride, magnesium sulfate, ondansetron **OR** ondansetron, acetaminophen **OR** acetaminophen, polyethylene glycol, oxyCODONE, oxyCODONE      Problem list:       Patient Active Problem List   Diagnosis Code    Lung nodule R91.1    Lung mass R91.8    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

## 2024-06-06 NOTE — PROGRESS NOTES
[facility]  Consultation Note    Reason for Consult: Hyponatremia  Requesting Physician:  Dr. FRANCHESCA Yost    CHIEF COMPLAINT: Chest pain    History obtained from records and patient.    HISTORY OF PRESENT ILLNESS:                Cici Hernández  is 53 y.o. y.o. female with significant past medical history of lung nodule status post mediastinal lymph node dissection on 5/23/2024, smoking who presents with chest pain after recent mediastinal lymph node dissection.  Chest pain worsens with breathing.  I am asked to see the patient since sodium was 132 on presentation, Increased to 151, in 15 hours, but repeat sodium about 10 hours after, was only 132 and currently sodium is 133.  Potassium was 5.2 but now better at 4.  Creatinine 0.9.  No polyuria.  Denies any nausea and or vomiting.  No diarrhea.    Past Medical History:     has a past medical history of Edema, Lung nodule, and Smoker.   Past Surgical History:     has a past surgical history that includes CT NEEDLE BIOPSY LUNG PERCUTANEOUS (04/16/2024); Tubal ligation; Cholecystectomy; Carpal tunnel release (Right); Nasal fracture surgery; Lung surgery (Right, 5/22/2024); and CT GUIDED CHEST TUBE (6/5/2024).   Current Medications:    Current Facility-Administered Medications: ferrous sulfate (IRON 325) tablet 325 mg, 325 mg, Oral, TID WC  lactulose (CHRONULAC) 10 GM/15ML solution 20 g, 20 g, Oral, TID  ipratropium 0.5 mg-albuterol 2.5 mg (DUONEB) nebulizer solution 1 Dose, 1 Dose, Inhalation, Q4H WA RT  gabapentin (NEURONTIN) capsule 300 mg, 300 mg, Oral, TID  senna (SENOKOT) tablet 8.6 mg, 1 tablet, Oral, Nightly PRN  ketorolac (TORADOL) injection 30 mg, 30 mg, IntraVENous, Q6H PRN  ibuprofen (ADVIL;MOTRIN) tablet 400 mg, 400 mg, Oral, TID WC  lactobacillus (CULTURELLE) capsule 1 capsule, 1 capsule, Oral, BID WC  traMADol (ULTRAM) tablet 50 mg, 50 mg, Oral, Q6H PRN  morphine (PF) injection 2 mg, 2 mg, IntraVENous, Q4H PRN  sodium  diarrhea  No fever/chills  Chronic leg swelling  No change in urine output nor gross hematuria    Subjective:  Transferred to ICU with chest tube placement due to increasing pneumothorax.     PHYSICAL EXAM:    Vitals:  /77   Pulse 82   Temp 98 °F (36.7 °C) (Temporal)   Resp 18   Ht 1.651 m (5' 5\")   Wt 105.3 kg (232 lb 1.6 oz)   SpO2 94%   BMI 38.62 kg/m²   CONSTITUTIONAL:  awake, alert, cooperative, no apparent distress, and appears stated age  EYES:  Lids and lashes normal, pupils equal, round   NECK:  Supple, symmetrical, trachea midline, no adenopathy, thyroid symmetric, not enlarged and no tenderness, skin normal  HEMATOLOGIC/LYMPHATICS:  no cervical lymphadenopathy  LUNGS:  No increased work of breathing, good air exchange, clear to auscultation bilaterally, no crackles or wheezing  CARDIOVASCULAR:  Normal apical impulse, regular rate and rhythm, normal S1 and S2  ABDOMEN:  Normal bowel sounds, soft, non-distended, non-tender, no masses palpated, no hepatosplenomegaly  MUSCULOSKELETAL:  There is no redness, warmth, or swelling of the joints.  1+ edema      DATA:    CBC:   Lab Results   Component Value Date/Time    WBC 10.5 06/06/2024 04:30 AM    RBC 3.30 06/06/2024 04:30 AM    HGB 10.0 06/06/2024 04:30 AM    HCT 28.6 06/06/2024 04:30 AM    MCV 86.6 06/06/2024 04:30 AM    MCH 30.3 06/06/2024 04:30 AM    MCHC 35.0 06/06/2024 04:30 AM    RDW 13.5 06/06/2024 04:30 AM     06/06/2024 04:30 AM    MPV 7.2 06/06/2024 04:30 AM     BMP:   Lab Results   Component Value Date/Time     06/05/2024 09:33 AM    K 3.6 06/05/2024 09:33 AM    K 5.2 06/02/2024 10:25 AM     06/05/2024 09:33 AM    CO2 21 06/05/2024 09:33 AM    BUN 19 06/05/2024 09:33 AM    CREATININE 0.8 06/05/2024 09:33 AM    CALCIUM 9.2 06/05/2024 09:33 AM    LABGLOM 86 06/05/2024 09:33 AM    LABGLOM 87 04/16/2024 08:15 AM    GLUCOSE 160 06/05/2024 09:33 AM   Magnesium:  No results found for: \"MG\"Phosphorus:  No results found for:

## 2024-06-06 NOTE — RT PROTOCOL NOTE
RT Inhaler-Nebulizer Bronchodilator Protocol Note    There is a bronchodilator order in the chart from a provider indicating to follow the RT Bronchodilator Protocol and there is an “Initiate RT Inhaler-Nebulizer Bronchodilator Protocol” order as well (see protocol at bottom of note).    CXR Findings:  XR CHEST PORTABLE    Result Date: 6/6/2024  Interval placement of right-sided pigtail chest tube.  No evidence of a pneumothorax.  Small effusion.       The findings from the last RT Protocol Assessment were as follows:   History Pulmonary Disease: Chronic pulmonary disease  Respiratory Pattern: Mild dyspnea at rest, irregular pattern, or RR 21-25 bpm  Breath Sounds: Clear breath sounds  Cough: Weak, non-productive  Indication for Bronchodilator Therapy:    Bronchodilator Assessment Score: 9    Aerosolized bronchodilator medication orders have been revised according to the RT Inhaler-Nebulizer Bronchodilator Protocol below.    Respiratory Therapist to perform RT Therapy Protocol Assessment initially then follow the protocol.  Repeat RT Therapy Protocol Assessment PRN for score 0-3 or on second treatment, BID, and PRN for scores above 3.    No Indications - adjust the frequency to every 6 hours PRN wheezing or bronchospasm, if no treatments needed after 48 hours then discontinue using Per Protocol order mode.     If indication present, adjust the RT bronchodilator orders based on the Bronchodilator Assessment Score as indicated below.  Use Inhaler orders unless patient has one or more of the following: on home nebulizer, not able to hold breath for 10 seconds, is not alert and oriented, cannot activate and use MDI correctly, or respiratory rate 25 breaths per minute or more, then use the equivalent nebulizer order(s) with same Frequency and PRN reasons based on the score.  If a patient is on this medication at home then do not decrease Frequency below that used at home.    0-3 - enter or revise RT bronchodilator  order(s) to equivalent RT Bronchodilator order with Frequency of every 4 hours PRN for wheezing or increased work of breathing using Per Protocol order mode.        4-6 - enter or revise RT Bronchodilator order(s) to two equivalent RT bronchodilator orders with one order with BID Frequency and one order with Frequency of every 4 hours PRN wheezing or increased work of breathing using Per Protocol order mode.        7-10 - enter or revise RT Bronchodilator order(s) to two equivalent RT bronchodilator orders with one order with TID Frequency and one order with Frequency of every 4 hours PRN wheezing or increased work of breathing using Per Protocol order mode.       11-13 - enter or revise RT Bronchodilator order(s) to one equivalent RT bronchodilator order with QID Frequency and an Albuterol order with Frequency of every 4 hours PRN wheezing or increased work of breathing using Per Protocol order mode.      Greater than 13 - enter or revise RT Bronchodilator order(s) to one equivalent RT bronchodilator order with every 4 hours Frequency and an Albuterol order with Frequency of every 2 hours PRN wheezing or increased work of breathing using Per Protocol order mode.     RT to enter RT Home Evaluation for COPD & MDI Assessment order using Per Protocol order mode.    Electronically signed by HALEY AGUSTIN RCP on 6/6/2024 at 5:45 PM

## 2024-06-06 NOTE — PROGRESS NOTES
06/06/24 1745   RT Protocol   History Pulmonary Disease 2   Respiratory pattern 4   Breath sounds 0   Cough 3   Bronchodilator Assessment Score 9

## 2024-06-06 NOTE — PROGRESS NOTES
CVTS Cardiothoracic Progress Note:    Surgery: S/P robotic assisted right lower lobe with mediastinal lymph node dissection on 5/22/24 with Dr. Milan. Path demonstrated carcinoid tumor and 1 LN was positive.   Surgeon: Dr. Milan  POD #: 15    Subjective:   6/5: Patient extremely anxious (wanting to go home) sitting up in bed on RA. Alert and oriented x 4   6/6: Patient seen and examined this morning; states she hasn't been able to have BM yet; some discomfort from CT placement yesterday.     Vital Signs: /77   Pulse 82   Temp 98 °F (36.7 °C) (Temporal)   Resp 18   Ht 1.651 m (5' 5\")   Wt 105.3 kg (232 lb 1.6 oz)   SpO2 94%   BMI 38.62 kg/m²  O2 Flow Rate (L/min): 2 L/min     LABORATORY DATA:    CBC:   Recent Labs     06/04/24  0529 06/06/24  0430   WBC 10.7 10.5   HGB 9.9* 10.0*   HCT 28.1* 28.6*   MCV 87.4 86.6    473*     BMP:   Recent Labs     06/04/24  0529 06/05/24  0933    139   K 4.1 3.6   CL 98* 100   CO2 24 21   BUN 20 19   CREATININE 0.8 0.8     Physicial Exam:   General appearance: NAD  Lungs: lower lung sound right side, CTA left side   Heart: SR on monitor  Abdomen: +bowel sounds, non-tender, non-distended  Wound/Incisions: robotic post sites CDI   Extremities: BLE pulses intact; moderate LE edema bilaterally   Neurological: A/O x4      Assessment  Carcinoid Tumor s/p robotic assisted right lower lobe with mediastinal lymph node dissection on 5/22/24 with Dr. Milan   Pleuritis   Tobacco Abuse  Hydropneumothorax    Cardiac Meds  ASA 81 mg daily  Lipitor 40 mg nightly  Lovenox 30 mg sub-Q 2 times daily      Non-Cardiac Meds  Ferrous Sulfate 325 mg TID  Ibuprofen 400 mg TID  Duoneb  Lactobacillus 1 capsule BID  Lactulose 20 g TID  IV Zosyn     Today's Plan:  Ferrous Sulfate 325 mg TID  Chest PT  Duonebs  IV Toradol 30 mg q 6 hrs Not PRN , Tylenol 650 mg q 6 hrs PRN (alternate between the two as needed for pain relief)  Gabapentin 300 mg TID  Bowel

## 2024-06-06 NOTE — PROGRESS NOTES
Hospitalist Progress Note      PCP: Sallie Becker MD    Date of Admission: 6/1/2024    Chief Complaint: Left-sided chest pain pleuritic    Hospital Course:   -Shortness of breath is improved chest pain is continue to improve no nausea vomiting fevers  Medications:  Reviewed      Exam:    /81   Pulse 84   Temp 97.8 °F (36.6 °C) (Temporal)   Resp 18   Ht 1.651 m (5' 5\")   Wt 105.3 kg (232 lb 1.6 oz)   SpO2 93%   BMI 38.62 kg/m²       General appearance:  Appears comfortable. AAOx3  HEENT: atraumatic, Pupils equal, muscous membranes moist, no masses appreciated  Cardiovascular: Regular rate and rhythm no murmurs appreciated  Respiratory: CTAB no wheezing  Gastrointestinal: Abdomen soft, non-tender, BS+  EXT: no edema  Neurology: no gross focal deficts  Psychiatry: Appropriate affect. Not agitated  Skin: Warm, dry, no rashes appreciated        Labs:   Recent Labs     06/04/24  0529 06/06/24  0430   WBC 10.7 10.5   HGB 9.9* 10.0*   HCT 28.1* 28.6*    473*       Recent Labs     06/04/24  0529 06/05/24  0933 06/06/24  1030    139 136   K 4.1 3.6 3.7   CL 98* 100 99   CO2 24 21 23   BUN 20 19 16   CREATININE 0.8 0.8 0.6   CALCIUM 9.2 9.2 9.2       No results for input(s): \"AST\", \"ALT\", \"BILIDIR\", \"BILITOT\", \"ALKPHOS\" in the last 72 hours.    No results for input(s): \"INR\" in the last 72 hours.  No results for input(s): \"CKTOTAL\", \"TROPONINI\" in the last 72 hours.    Urinalysis:      Lab Results   Component Value Date/Time    NITRU Negative 06/02/2024 03:15 AM    WBCUA 6 06/02/2024 03:15 AM    BACTERIA None Seen 06/02/2024 03:15 AM    RBCUA 2 06/02/2024 03:15 AM    BLOODU Negative 06/02/2024 03:15 AM    GLUCOSEU Negative 06/02/2024 03:15 AM       Radiology:  XR CHEST PORTABLE   Final Result   Interval placement of right-sided pigtail chest tube.  No evidence of a   pneumothorax.  Small effusion.         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] 05/29/2024       Acute Medical Issues Being Addressed:  53-year-old admitted to the hospital with right-sided chest pain    Chest pain secondary to pleuritis with pneumothorax  Lung carcinoid s/p resection, there was suspicion of histoplasmosis   S/p chest tube repeat xray improving pneumothorax  Continue chest tube per ct surgery  Iv atbx  Id on board        Hyponatremia  improved    DVT Prophylaxis Lovenox  Diet: ADULT DIET; Regular; 1500 ml; No Caffeine  ADULT ORAL NUTRITION SUPPLEMENT; Breakfast, Lunch, Dinner; Standard High Calorie/High Protein Oral Supplement  Code Status: Full Code      Dispo - Home

## 2024-06-06 NOTE — CARE COORDINATION
Chart reviewed for discharge planning.     Pt had robotic assisted right lower lobe with mediastinal lymph node dissection on 5/22/24.    Pt currently has chest tube.     ID is following pt and currently on IV Zosyn.    No therapy ordered at this time.    Pt active with Sue thurman.    CM will continue to follow for discharge plan.    Liyah Barfield RN, BSN  [phone]

## 2024-06-06 NOTE — PROGRESS NOTES
PULMONARY AND CRITICAL CARE MEDICINE PROGRESS NOTE      SUBJECTIVE: Patient is s/p right-sided 10 Turkmen pigtail catheter.  She is complaining of pain at the chest tube site.    REVIEW OF SYSTEMS:   CONSTITUTIONAL SYMPTOMS: The patient denies fever, fatigue, night sweats, weight loss or weight gain.   HEENT: No vision changes. No tinnitus, Denies sinus pain. No hoarseness, or dysphagia.   CARDIOVASCULAR: Denies chest pain, palpitation, syncope.  RESPIRATORY: Positive for shortness of breath or cough.   GASTROINTESTINAL: Denies nausea, abdominal pain or change in bowel function.  SKIN: No rashes or itching.   MUSCULOSKELETAL: Denies weakness or bone pain.   NEUROLOGICAL: No headaches or seizures.     MEDICATIONS:      ferrous sulfate  325 mg Oral TID WC    lactulose  20 g Oral TID    ipratropium 0.5 mg-albuterol 2.5 mg  1 Dose Inhalation Q4H WA RT    gabapentin  300 mg Oral TID    ibuprofen  400 mg Oral TID WC    lactobacillus  1 capsule Oral BID WC    sodium chloride flush  5-40 mL IntraVENous 2 times per day    atorvastatin  40 mg Oral Nightly    enoxaparin  30 mg SubCUTAneous BID    aspirin  81 mg Oral Daily    piperacillin-tazobactam  3,375 mg IntraVENous q8h      sodium chloride 10 mL/hr at 06/05/24 2010     senna, ketorolac, traMADol, morphine, sodium chloride flush, sodium chloride, potassium chloride **OR** potassium alternative oral replacement **OR** potassium chloride, magnesium sulfate, ondansetron **OR** ondansetron, acetaminophen **OR** acetaminophen, polyethylene glycol, oxyCODONE, oxyCODONE     ALLERGIES:   Allergies as of 06/01/2024 - Fully Reviewed 06/01/2024   Allergen Reaction Noted    Naproxen Anaphylaxis, Hives, and Shortness Of Breath 11/30/2009        OBJECTIVE:   height is 1.651 m (5' 5\") and weight is 105.3 kg (232 lb 1.6 oz). Her temporal temperature is 97.8 °F (36.6 °C). Her blood pressure is 116/81 and her pulse is 84. Her respiration is 18 and oxygen saturation is 93%.   I/O this

## 2024-06-07 ENCOUNTER — APPOINTMENT (OUTPATIENT)
Dept: CT IMAGING | Age: 54
DRG: 871 | End: 2024-06-07
Payer: COMMERCIAL

## 2024-06-07 ENCOUNTER — APPOINTMENT (OUTPATIENT)
Dept: GENERAL RADIOLOGY | Age: 54
DRG: 871 | End: 2024-06-07
Payer: COMMERCIAL

## 2024-06-07 LAB
ANION GAP SERPL CALCULATED.3IONS-SCNC: 14 MMOL/L (ref 3–16)
BUN SERPL-MCNC: 16 MG/DL (ref 7–20)
CALCIUM SERPL-MCNC: 8.9 MG/DL (ref 8.3–10.6)
CHLORIDE SERPL-SCNC: 100 MMOL/L (ref 99–110)
CO2 SERPL-SCNC: 22 MMOL/L (ref 21–32)
CREAT SERPL-MCNC: 0.6 MG/DL (ref 0.6–1.1)
GFR SERPLBLD CREATININE-BSD FMLA CKD-EPI: >90 ML/MIN/{1.73_M2}
GLUCOSE SERPL-MCNC: 145 MG/DL (ref 70–99)
POTASSIUM SERPL-SCNC: 3.9 MMOL/L (ref 3.5–5.1)
SODIUM SERPL-SCNC: 136 MMOL/L (ref 136–145)

## 2024-06-07 PROCEDURE — 71250 CT THORAX DX C-: CPT

## 2024-06-07 PROCEDURE — 80048 BASIC METABOLIC PNL TOTAL CA: CPT

## 2024-06-07 PROCEDURE — 6370000000 HC RX 637 (ALT 250 FOR IP)

## 2024-06-07 PROCEDURE — 99233 SBSQ HOSP IP/OBS HIGH 50: CPT | Performed by: INTERNAL MEDICINE

## 2024-06-07 PROCEDURE — 71045 X-RAY EXAM CHEST 1 VIEW: CPT

## 2024-06-07 PROCEDURE — 94667 MNPJ CHEST WALL 1ST: CPT

## 2024-06-07 PROCEDURE — 94640 AIRWAY INHALATION TREATMENT: CPT

## 2024-06-07 PROCEDURE — 6370000000 HC RX 637 (ALT 250 FOR IP): Performed by: INTERNAL MEDICINE

## 2024-06-07 PROCEDURE — 6370000000 HC RX 637 (ALT 250 FOR IP): Performed by: THORACIC SURGERY (CARDIOTHORACIC VASCULAR SURGERY)

## 2024-06-07 PROCEDURE — 2580000003 HC RX 258: Performed by: INTERNAL MEDICINE

## 2024-06-07 PROCEDURE — 94761 N-INVAS EAR/PLS OXIMETRY MLT: CPT

## 2024-06-07 PROCEDURE — 99232 SBSQ HOSP IP/OBS MODERATE 35: CPT | Performed by: INTERNAL MEDICINE

## 2024-06-07 PROCEDURE — 2000000000 HC ICU R&B

## 2024-06-07 PROCEDURE — 6360000002 HC RX W HCPCS: Performed by: INTERNAL MEDICINE

## 2024-06-07 RX ADMIN — IPRATROPIUM BROMIDE AND ALBUTEROL SULFATE 1 DOSE: .5; 3 SOLUTION RESPIRATORY (INHALATION) at 12:50

## 2024-06-07 RX ADMIN — Medication 10 ML: at 09:02

## 2024-06-07 RX ADMIN — PIPERACILLIN AND TAZOBACTAM 3375 MG: 3; .375 INJECTION, POWDER, LYOPHILIZED, FOR SOLUTION INTRAVENOUS at 20:20

## 2024-06-07 RX ADMIN — IPRATROPIUM BROMIDE AND ALBUTEROL SULFATE 1 DOSE: .5; 3 SOLUTION RESPIRATORY (INHALATION) at 08:11

## 2024-06-07 RX ADMIN — TRAMADOL HYDROCHLORIDE 50 MG: 50 TABLET ORAL at 17:37

## 2024-06-07 RX ADMIN — IBUPROFEN 400 MG: 400 TABLET, FILM COATED ORAL at 12:00

## 2024-06-07 RX ADMIN — ENOXAPARIN SODIUM 30 MG: 100 INJECTION SUBCUTANEOUS at 20:11

## 2024-06-07 RX ADMIN — ATORVASTATIN CALCIUM 40 MG: 40 TABLET, FILM COATED ORAL at 20:11

## 2024-06-07 RX ADMIN — IBUPROFEN 400 MG: 400 TABLET, FILM COATED ORAL at 09:03

## 2024-06-07 RX ADMIN — IBUPROFEN 400 MG: 400 TABLET, FILM COATED ORAL at 16:08

## 2024-06-07 RX ADMIN — Medication 10 ML: at 20:11

## 2024-06-07 RX ADMIN — Medication 1 CAPSULE: at 09:03

## 2024-06-07 RX ADMIN — TRAMADOL HYDROCHLORIDE 50 MG: 50 TABLET ORAL at 22:44

## 2024-06-07 RX ADMIN — GABAPENTIN 300 MG: 300 CAPSULE ORAL at 14:47

## 2024-06-07 RX ADMIN — GABAPENTIN 300 MG: 300 CAPSULE ORAL at 20:11

## 2024-06-07 RX ADMIN — PIPERACILLIN AND TAZOBACTAM 3375 MG: 3; .375 INJECTION, POWDER, LYOPHILIZED, FOR SOLUTION INTRAVENOUS at 04:27

## 2024-06-07 RX ADMIN — IPRATROPIUM BROMIDE AND ALBUTEROL SULFATE 1 DOSE: .5; 3 SOLUTION RESPIRATORY (INHALATION) at 16:37

## 2024-06-07 RX ADMIN — GABAPENTIN 300 MG: 300 CAPSULE ORAL at 09:03

## 2024-06-07 RX ADMIN — Medication 1 CAPSULE: at 16:08

## 2024-06-07 RX ADMIN — ENOXAPARIN SODIUM 30 MG: 100 INJECTION SUBCUTANEOUS at 09:02

## 2024-06-07 RX ADMIN — IPRATROPIUM BROMIDE AND ALBUTEROL SULFATE 1 DOSE: .5; 3 SOLUTION RESPIRATORY (INHALATION) at 21:12

## 2024-06-07 RX ADMIN — SODIUM CHLORIDE: 9 INJECTION, SOLUTION INTRAVENOUS at 20:19

## 2024-06-07 RX ADMIN — PIPERACILLIN AND TAZOBACTAM 3375 MG: 3; .375 INJECTION, POWDER, LYOPHILIZED, FOR SOLUTION INTRAVENOUS at 12:03

## 2024-06-07 RX ADMIN — FERROUS SULFATE TAB 325 MG (65 MG ELEMENTAL FE) 325 MG: 325 (65 FE) TAB at 16:08

## 2024-06-07 RX ADMIN — FERROUS SULFATE TAB 325 MG (65 MG ELEMENTAL FE) 325 MG: 325 (65 FE) TAB at 12:00

## 2024-06-07 RX ADMIN — FERROUS SULFATE TAB 325 MG (65 MG ELEMENTAL FE) 325 MG: 325 (65 FE) TAB at 09:02

## 2024-06-07 RX ADMIN — ASPIRIN 81 MG: 81 TABLET, CHEWABLE ORAL at 09:03

## 2024-06-07 ASSESSMENT — PAIN SCALES - GENERAL
PAINLEVEL_OUTOF10: 0
PAINLEVEL_OUTOF10: 0
PAINLEVEL_OUTOF10: 4

## 2024-06-07 NOTE — PROGRESS NOTES
PULMONARY AND CRITICAL CARE MEDICINE PROGRESS NOTE      SUBJECTIVE: Patient is sitting comfortably in the chair.  States improvement in right-sided chest pain at the chest tube site.  Chest tube output only 70 cc overnight.  Chest x-ray from today continues to show persistent right-sided effusion with probable atelectasis.    REVIEW OF SYSTEMS:   CONSTITUTIONAL SYMPTOMS: The patient denies fever, fatigue, night sweats, weight loss or weight gain.   HEENT: No vision changes. No tinnitus, Denies sinus pain. No hoarseness, or dysphagia.   CARDIOVASCULAR: Denies chest pain, palpitation, syncope.  RESPIRATORY: Positive for shortness of breath or cough.   GASTROINTESTINAL: Denies nausea, abdominal pain or change in bowel function.  SKIN: No rashes or itching.   MUSCULOSKELETAL: Denies weakness or bone pain.   NEUROLOGICAL: No headaches or seizures.     MEDICATIONS:      ferrous sulfate  325 mg Oral TID WC    ipratropium 0.5 mg-albuterol 2.5 mg  1 Dose Inhalation Q4H WA RT    gabapentin  300 mg Oral TID    ibuprofen  400 mg Oral TID WC    lactobacillus  1 capsule Oral BID WC    sodium chloride flush  5-40 mL IntraVENous 2 times per day    atorvastatin  40 mg Oral Nightly    enoxaparin  30 mg SubCUTAneous BID    aspirin  81 mg Oral Daily    piperacillin-tazobactam  3,375 mg IntraVENous q8h      sodium chloride 10 mL/hr at 06/06/24 2101     senna, ketorolac, traMADol, morphine, sodium chloride flush, sodium chloride, potassium chloride **OR** potassium alternative oral replacement **OR** potassium chloride, magnesium sulfate, ondansetron **OR** ondansetron, acetaminophen **OR** acetaminophen, polyethylene glycol, oxyCODONE, oxyCODONE     ALLERGIES:   Allergies as of 06/01/2024 - Fully Reviewed 06/01/2024   Allergen Reaction Noted    Naproxen Anaphylaxis, Hives, and Shortness Of Breath 11/30/2009        OBJECTIVE:   height is 1.651 m (5' 5\") and weight is 107.1 kg (236 lb 1.8 oz). Her temporal temperature is 98.3 °F (36.8  °C). Her blood pressure is 119/75 and her pulse is 93. Her respiration is 18 and oxygen saturation is 93%.   I/O this shift:  In: 41.9 [IV Piggyback:41.9]  Out: -      PHYSICAL EXAM:  CONSTITUTIONAL: She is a 53 y.o.-year-old who appears her stated age. She is alert and oriented x 3 and in no acute distress.   CARDIOVASCULAR: S1 S2 RRR. Without murmer  RESPIRATORY & CHEST: Decreased breath sounds in right lung fields. No wheezing, no crackles.   GASTROINTESTINAL & ABDOMEN: Soft, nontender, positive bowel sounds in all quadrants, non-distended, without hepatosplenomegaly.   GENITOURINARY: Deferred.   MUSCULOSKELETAL: No tenderness to palpation of the axial skeleton. There is no clubbing. No cyanosis. No edema of the lower extremities.   SKIN OF BODY: No rash or jaundice.   PSYCHIATRIC EVALUATION: Normal affect. Patient answers questions appropriately.   HEMATOLOGIC/LYMPHATIC/ IMMUNOLOGIC: No palpable lymphadenopathy.  NEUROLOGIC: Alert and oriented x 3.Groslly non-focal. Motor strength is 5+/5 in all muscle groups. The patient has a normal sensorium globally.      LABS:   Lab Results   Component Value Date    WBC 10.5 06/06/2024    HGB 10.0 (L) 06/06/2024    HCT 28.6 (L) 06/06/2024     (H) 06/06/2024    ALT 15 06/01/2024    AST 22 06/01/2024     06/07/2024    K 3.9 06/07/2024     06/07/2024    CREATININE 0.6 06/07/2024    BUN 16 06/07/2024    CO2 22 06/07/2024    INR 0.98 05/22/2024       Lab Results   Component Value Date    GLUCOSE 145 (H) 06/07/2024    CALCIUM 8.9 06/07/2024     06/07/2024    K 3.9 06/07/2024    CO2 22 06/07/2024     06/07/2024    BUN 16 06/07/2024    CREATININE 0.6 06/07/2024       Lab Results   Component Value Date    GLUCOSE 145 (H) 06/07/2024    CALCIUM 8.9 06/07/2024     06/07/2024    K 3.9 06/07/2024    CO2 22 06/07/2024     06/07/2024    BUN 16 06/07/2024    CREATININE 0.6 06/07/2024       IMPRESSION:   Right hydropneumothorax  Right lower lobe

## 2024-06-07 NOTE — CARE COORDINATION
Chart reviewed. Pt still has chest tube at this time.     ID still following.    CM will follow for pt progress and discharge needs.    Liyah Barfield RN, BSN  [phone]

## 2024-06-07 NOTE — PROGRESS NOTES
[facility]  Consultation Note    Reason for Consult: Hyponatremia  Requesting Physician:  Dr. FRANCHESCA Yost    CHIEF COMPLAINT: Chest pain    History obtained from records and patient.    HISTORY OF PRESENT ILLNESS:                Cici Hernández  is 53 y.o. y.o. female with significant past medical history of lung nodule status post mediastinal lymph node dissection on 5/23/2024, smoking who presents with chest pain after recent mediastinal lymph node dissection.  Chest pain worsens with breathing.  I am asked to see the patient since sodium was 132 on presentation, Increased to 151, in 15 hours, but repeat sodium about 10 hours after, was only 132 and currently sodium is 133.  Potassium was 5.2 but now better at 4.  Creatinine 0.9.  No polyuria.  Denies any nausea and or vomiting.  No diarrhea.    Past Medical History:     has a past medical history of Edema, Lung nodule, and Smoker.   Past Surgical History:     has a past surgical history that includes CT NEEDLE BIOPSY LUNG PERCUTANEOUS (04/16/2024); Tubal ligation; Cholecystectomy; Carpal tunnel release (Right); Nasal fracture surgery; Lung surgery (Right, 5/22/2024); and CT GUIDED CHEST TUBE (6/5/2024).   Current Medications:    Current Facility-Administered Medications: ferrous sulfate (IRON 325) tablet 325 mg, 325 mg, Oral, TID WC  ipratropium 0.5 mg-albuterol 2.5 mg (DUONEB) nebulizer solution 1 Dose, 1 Dose, Inhalation, Q4H WA RT  gabapentin (NEURONTIN) capsule 300 mg, 300 mg, Oral, TID  senna (SENOKOT) tablet 8.6 mg, 1 tablet, Oral, Nightly PRN  ketorolac (TORADOL) injection 30 mg, 30 mg, IntraVENous, Q6H PRN  ibuprofen (ADVIL;MOTRIN) tablet 400 mg, 400 mg, Oral, TID WC  lactobacillus (CULTURELLE) capsule 1 capsule, 1 capsule, Oral, BID WC  traMADol (ULTRAM) tablet 50 mg, 50 mg, Oral, Q6H PRN  morphine (PF) injection 2 mg, 2 mg, IntraVENous, Q4H PRN  sodium chloride flush 0.9 % injection 5-40 mL, 5-40 mL, IntraVENous, 2 times  per day  sodium chloride flush 0.9 % injection 5-40 mL, 5-40 mL, IntraVENous, PRN  0.9 % sodium chloride infusion, , IntraVENous, PRN  potassium chloride (KLOR-CON M) extended release tablet 40 mEq, 40 mEq, Oral, PRN **OR** potassium bicarb-citric acid (EFFER-K) effervescent tablet 40 mEq, 40 mEq, Oral, PRN **OR** potassium chloride 10 mEq/100 mL IVPB (Peripheral Line), 10 mEq, IntraVENous, PRN  magnesium sulfate 2000 mg in 50 mL IVPB premix, 2,000 mg, IntraVENous, PRN  ondansetron (ZOFRAN-ODT) disintegrating tablet 4 mg, 4 mg, Oral, Q8H PRN **OR** ondansetron (ZOFRAN) injection 4 mg, 4 mg, IntraVENous, Q6H PRN  acetaminophen (TYLENOL) tablet 650 mg, 650 mg, Oral, Q6H PRN **OR** acetaminophen (TYLENOL) suppository 650 mg, 650 mg, Rectal, Q6H PRN  polyethylene glycol (GLYCOLAX) packet 17 g, 17 g, Oral, Daily PRN  atorvastatin (LIPITOR) tablet 40 mg, 40 mg, Oral, Nightly  enoxaparin Sodium (LOVENOX) injection 30 mg, 30 mg, SubCUTAneous, BID  aspirin chewable tablet 81 mg, 81 mg, Oral, Daily  oxyCODONE (ROXICODONE) immediate release tablet 5 mg, 5 mg, Oral, Q4H PRN  oxyCODONE (ROXICODONE) immediate release tablet 10 mg, 10 mg, Oral, Q4H PRN  piperacillin-tazobactam (ZOSYN) 3,375 mg in sodium chloride 0.9 % 50 mL IVPB (mini-bag), 3,375 mg, IntraVENous, q8h  Allergies:    Naproxen   Social History:     reports that she has been smoking cigarettes. She started smoking about 28 years ago. She has a 28.3 pack-year smoking history. She has never used smokeless tobacco. She reports current alcohol use. She reports that she does not use drugs.   Family History:   family history includes Breast Cancer in her mother.     REVIEW OF SYSTEMS:    System review was done , pertinent positives are mentioned in the HPI    Positive fatigue  Intermittent chest pain but no palpitations  Has SOB.  No coughing nor hemoptysis  No abdominal pain, nausea, vomiting nor diarrhea  No fever/chills  Chronic leg swelling  No change in urine output

## 2024-06-07 NOTE — PROGRESS NOTES
Infectious Diseases   Progress Note      Admission Date: 6/1/2024  Hospital Day: Hospital Day: 7   Attending: Sonya Milan MD  Date of service: 6/7/2024     Chief complaint/ Reason for consult:     Sepsis on admission with leukocytosis, tachycardia, tachypnea, hypotension  Elevated CRP  Elevated sed rate  S/p robotic assisted right lower lobe and middle lobe lobectomy with mediastinal lymph node dissection surgery on 5/20/2024-surgical pathology was positive for neuroendocrine tumor grade 1 with +4 R lymph node for carcinoid  Histoplasma exposure-positive station 7 pulmonary lymph node cytopathology for histoplasma capsulatum    Microbiology:        I have reviewed allavailable micro lab data and cultures    Blood culture (2/2) - collected on 6/1/2024: Negative  Chest wall wound culture  - collected on 6/1/2024: In process    Culture, Wound  Order: [id number]  Status: Preliminary result       Visible to patient: No (not released)       Next appt: 06/13/2024 at 09:00 AM in Cardiothoracic Surgery (Junior Esquivel PA-C)    Specimen Information: Chest   0 Result Notes       Component  Resulting Agency   WOUND/ABSCESS No growth to date P [facility]   Gram Stain Result 1+ Epithelial Cells present  No WBC's seen  rare gram positive cocci present [facility]              Narrative  Performed by: [facility]  ORDER#: [id number]                          ORDERED BY: KENNY BOO  SOURCE: Chest                              COLLECTED:  06/03/24 16:15  ANTIBIOTICS AT [facility].:                      RECEIVED :  06/03/24 17:38      Specimen Collected: 06/03/24 16:15 EDT Last Resulted: 06/04/24 12:57 EDT               Antibiotics and immunizations:       Current antibiotics: All antibiotics and their doses were reviewed by me    Recent Abx Admin                     piperacillin-tazobactam (ZOSYN) 3,375 mg in sodium chloride 0.9 % 50 mL IVPB (mini-bag) (mg) 3,375 mg New Bag 06/07/24 2020      carcinoid  Histoplasma exposure-positive station 7 pulmonary lymph node cytopathology for histoplasma capsulatum  Necrotizing granulomatous inflammation involving subcarinal lymph nodes, level 4R and level 7 lymph nodes  Former heavy smoker with 28-pack-year smoking history, quit about 6 weeks ago-extensive counseling done  Mild diastolic dysfunction on 2D echo done on 5/6/2024 ejection fraction of 55 to 60%  Negative HIV screen on 5/29/2024  Obesity Class 2 due to excess calorie intake : Body mass index is 37.24 kg/m².      Discussion:      The patient is afebrile.  The patient is on IV Zosyn.  She is tolerating the antibiotic okay.    Pleural fluid cultures not available.    Serum creatinine 0.6.    Plan:     Diagnostic Workup:      Continue to follow  fever curve, WBC count and blood cultures.  Continue to monitor blood counts, liver and renal function.    Antimicrobials:    Continue IV Zosyn 3.375 g every 8 hours  Chest tube care  No pleural fluid culture data  Will be managing antibiotic based on clinical progress  We will follow up on the culture results and clinical progress and will make further recommendations accordingly.  Continue close vitals monitoring.  Maintain good glycemic control.  Fall precautions.  Aspiration precautions.  Continue to watch for new fever or diarrhea.  DVT prophylaxis.  Discussed all above with patient and RN.      Drug Monitoring:    Continue monitoring for antibiotic toxicity as follows: CBC, CMP   Continue to watch for following: new or worsening fever, new hypotension, hives, lip swelling and redness or purulence at vascular access sites.     I/v access Management:    Continue to monitor i.v access sites for erythema, induration, discharge or tenderness.   As always, continue efforts to minimize tubes/lines/drains as clinically appropriate to reduce chances of line associated infections.    Patient education and counseling:        The patient was educated in detail about the  appropriate clinical setting.   3. Subcutaneous emphysema in the anterior chest wall as well as a trace   amount of pneumoperitoneum anterior and posterior to the right liver.  These   could be postsurgical in etiology.   4. A few patchy ground-glass opacities in the right lung, nonspecific and may   be infectious or inflammatory in etiology.      RECOMMENDATIONS:   Multiple pulmonary nodules. Most significant: Right ground-glass pulmonary   nodule within the upper lobe measuring 12 mm. Per Fleischner Society   Guidelines, recommend a non-contrast Chest CT at 3-6 months. Subsequent   management based on the most suspicious nodule(s).      These guidelines do not apply to immunocompromised patients and patients with   cancer. Follow up in patients with significant comorbidities as clinically   warranted. For lung cancer screening, adhere to Lung-RADS guidelines.   Reference: Radiology. 2017; 284(1):228-43.            XR CHEST PORTABLE   Final Result   Small right pleural effusion with adjacent atelectasis.  No obvious   pneumothorax identified.             Medications: All current and past medications were reviewed.     ferrous sulfate  325 mg Oral TID WC    ipratropium 0.5 mg-albuterol 2.5 mg  1 Dose Inhalation Q4H WA RT    gabapentin  300 mg Oral TID    ibuprofen  400 mg Oral TID WC    lactobacillus  1 capsule Oral BID WC    sodium chloride flush  5-40 mL IntraVENous 2 times per day    atorvastatin  40 mg Oral Nightly    enoxaparin  30 mg SubCUTAneous BID    aspirin  81 mg Oral Daily    piperacillin-tazobactam  3,375 mg IntraVENous q8h        sodium chloride 10 mL/hr at 06/07/24 2019       senna, ketorolac, traMADol, morphine, sodium chloride flush, sodium chloride, potassium chloride **OR** potassium alternative oral replacement **OR** potassium chloride, magnesium sulfate, ondansetron **OR** ondansetron, acetaminophen **OR** acetaminophen, polyethylene glycol, oxyCODONE, oxyCODONE      Problem list:       Patient

## 2024-06-07 NOTE — PROGRESS NOTES
CT milked-- 30 of serous fluid instantly from chest tube. NOted that CT may have been tipped at some point, first column not all the way full.   CT total at this point is @ 210    8:51 PM

## 2024-06-07 NOTE — PROGRESS NOTES
CVTS Cardiothoracic Progress Note:    Surgery: S/P robotic assisted right lower lobe with mediastinal lymph node dissection on 5/22/24 with Dr. Milan. Path demonstrated carcinoid tumor and 1 LN was positive.   Surgeon: Dr. Milan  POD #: 15    Re-Admitted on to the ED on 6/2 with uncontrolled chest pain    Procedure: IR guided right chest tube placement   Physician: Dr. spaulding  POD#: 2     Subjective:   6/5: Patient extremely anxious (wanting to go home) sitting up in bed on RA. Alert and oriented x 4     6/6: Patient seen and examined this morning; states she hasn't been able to have BM yet; some discomfort from CT placement yesterday.     6/7: patient seen and examined this morning; siting in chair, pain much improved; repeat CXR with no significant change from previous study; plan repeat CT Chest      Vital Signs: /78   Pulse 91   Temp 98.1 °F (36.7 °C) (Temporal)   Resp 18   Ht 1.651 m (5' 5\")   Wt 107.1 kg (236 lb 1.8 oz)   SpO2 96%   BMI 39.29 kg/m²  O2 Flow Rate (L/min): 2 L/min       LABORATORY DATA:    CBC:   Recent Labs     06/06/24  0430   WBC 10.5   HGB 10.0*   HCT 28.6*   MCV 86.6   *     BMP:   Recent Labs     06/05/24  0933 06/06/24  1030 06/07/24  0950    136 136   K 3.6 3.7 3.9    99 100   CO2 21 23 22   BUN 19 16 16   CREATININE 0.8 0.6 0.6       Physicial Exam:   General appearance: NAD  Lungs: CTA left side; right lower lung diminished  Heart: SR on monitor  Abdomen: +bowel sounds, non-tender   Wound/Incisions: robotic port sites CDI  Extremities: BLE pulses intact; moderate LE edema bilaterally  Neurological: A/O x4      Assessment  Carcinoid Tumor s/p robotic assisted right lower lobe with mediastinal lymph node dissection on 5/22/24 with Dr. Milan   Pleuritis   Tobacco Abuse  Hydropneumothorax  Centrilobular emphysema      Cardiac Meds  ASA 81 mg daily  Lipitor 40 mg nightly  Lovenox 30 mg sub-Q 2 times daily        Non-Cardiac

## 2024-06-08 ENCOUNTER — APPOINTMENT (OUTPATIENT)
Dept: GENERAL RADIOLOGY | Age: 54
DRG: 871 | End: 2024-06-08
Payer: COMMERCIAL

## 2024-06-08 PROCEDURE — 94640 AIRWAY INHALATION TREATMENT: CPT

## 2024-06-08 PROCEDURE — 71045 X-RAY EXAM CHEST 1 VIEW: CPT

## 2024-06-08 PROCEDURE — 6370000000 HC RX 637 (ALT 250 FOR IP): Performed by: INTERNAL MEDICINE

## 2024-06-08 PROCEDURE — 99232 SBSQ HOSP IP/OBS MODERATE 35: CPT | Performed by: STUDENT IN AN ORGANIZED HEALTH CARE EDUCATION/TRAINING PROGRAM

## 2024-06-08 PROCEDURE — 6360000002 HC RX W HCPCS: Performed by: INTERNAL MEDICINE

## 2024-06-08 PROCEDURE — 6370000000 HC RX 637 (ALT 250 FOR IP)

## 2024-06-08 PROCEDURE — 2000000000 HC ICU R&B

## 2024-06-08 PROCEDURE — 6360000002 HC RX W HCPCS

## 2024-06-08 PROCEDURE — 94668 MNPJ CHEST WALL SBSQ: CPT

## 2024-06-08 PROCEDURE — 2580000003 HC RX 258: Performed by: INTERNAL MEDICINE

## 2024-06-08 PROCEDURE — 94761 N-INVAS EAR/PLS OXIMETRY MLT: CPT

## 2024-06-08 PROCEDURE — 94669 MECHANICAL CHEST WALL OSCILL: CPT

## 2024-06-08 PROCEDURE — 6370000000 HC RX 637 (ALT 250 FOR IP): Performed by: THORACIC SURGERY (CARDIOTHORACIC VASCULAR SURGERY)

## 2024-06-08 RX ORDER — ALPRAZOLAM 0.5 MG/1
0.5 TABLET ORAL EVERY 8 HOURS PRN
Status: DISCONTINUED | OUTPATIENT
Start: 2024-06-08 | End: 2024-06-10 | Stop reason: HOSPADM

## 2024-06-08 RX ADMIN — ALPRAZOLAM 0.5 MG: 0.5 TABLET ORAL at 14:28

## 2024-06-08 RX ADMIN — IBUPROFEN 400 MG: 400 TABLET, FILM COATED ORAL at 17:16

## 2024-06-08 RX ADMIN — PIPERACILLIN AND TAZOBACTAM 3375 MG: 3; .375 INJECTION, POWDER, LYOPHILIZED, FOR SOLUTION INTRAVENOUS at 22:27

## 2024-06-08 RX ADMIN — IPRATROPIUM BROMIDE AND ALBUTEROL SULFATE 1 DOSE: .5; 3 SOLUTION RESPIRATORY (INHALATION) at 16:09

## 2024-06-08 RX ADMIN — FERROUS SULFATE TAB 325 MG (65 MG ELEMENTAL FE) 325 MG: 325 (65 FE) TAB at 17:16

## 2024-06-08 RX ADMIN — GABAPENTIN 300 MG: 300 CAPSULE ORAL at 12:08

## 2024-06-08 RX ADMIN — Medication 1 CAPSULE: at 17:16

## 2024-06-08 RX ADMIN — ASPIRIN 81 MG: 81 TABLET, CHEWABLE ORAL at 09:17

## 2024-06-08 RX ADMIN — IBUPROFEN 400 MG: 400 TABLET, FILM COATED ORAL at 09:17

## 2024-06-08 RX ADMIN — IPRATROPIUM BROMIDE AND ALBUTEROL SULFATE 1 DOSE: .5; 3 SOLUTION RESPIRATORY (INHALATION) at 12:33

## 2024-06-08 RX ADMIN — FERROUS SULFATE TAB 325 MG (65 MG ELEMENTAL FE) 325 MG: 325 (65 FE) TAB at 09:17

## 2024-06-08 RX ADMIN — IPRATROPIUM BROMIDE AND ALBUTEROL SULFATE 1 DOSE: .5; 3 SOLUTION RESPIRATORY (INHALATION) at 09:35

## 2024-06-08 RX ADMIN — ATORVASTATIN CALCIUM 40 MG: 40 TABLET, FILM COATED ORAL at 21:05

## 2024-06-08 RX ADMIN — ENOXAPARIN SODIUM 30 MG: 100 INJECTION SUBCUTANEOUS at 09:18

## 2024-06-08 RX ADMIN — Medication 1 CAPSULE: at 09:17

## 2024-06-08 RX ADMIN — FERROUS SULFATE TAB 325 MG (65 MG ELEMENTAL FE) 325 MG: 325 (65 FE) TAB at 12:08

## 2024-06-08 RX ADMIN — SODIUM CHLORIDE: 9 INJECTION, SOLUTION INTRAVENOUS at 22:21

## 2024-06-08 RX ADMIN — ENOXAPARIN SODIUM 30 MG: 100 INJECTION SUBCUTANEOUS at 21:07

## 2024-06-08 RX ADMIN — IPRATROPIUM BROMIDE AND ALBUTEROL SULFATE 1 DOSE: .5; 3 SOLUTION RESPIRATORY (INHALATION) at 20:29

## 2024-06-08 RX ADMIN — Medication 10 ML: at 09:19

## 2024-06-08 RX ADMIN — PIPERACILLIN AND TAZOBACTAM 3375 MG: 3; .375 INJECTION, POWDER, LYOPHILIZED, FOR SOLUTION INTRAVENOUS at 14:25

## 2024-06-08 RX ADMIN — IBUPROFEN 400 MG: 400 TABLET, FILM COATED ORAL at 12:08

## 2024-06-08 RX ADMIN — PIPERACILLIN AND TAZOBACTAM 3375 MG: 3; .375 INJECTION, POWDER, LYOPHILIZED, FOR SOLUTION INTRAVENOUS at 05:49

## 2024-06-08 RX ADMIN — Medication 10 ML: at 21:05

## 2024-06-08 RX ADMIN — GABAPENTIN 300 MG: 300 CAPSULE ORAL at 09:17

## 2024-06-08 RX ADMIN — KETOROLAC TROMETHAMINE 30 MG: 30 INJECTION, SOLUTION INTRAMUSCULAR at 06:59

## 2024-06-08 ASSESSMENT — PAIN DESCRIPTION - DESCRIPTORS: DESCRIPTORS: ACHING

## 2024-06-08 ASSESSMENT — PAIN SCALES - GENERAL
PAINLEVEL_OUTOF10: 6
PAINLEVEL_OUTOF10: 0

## 2024-06-08 ASSESSMENT — PAIN DESCRIPTION - LOCATION: LOCATION: CHEST

## 2024-06-08 ASSESSMENT — PAIN DESCRIPTION - ORIENTATION: ORIENTATION: RIGHT;UPPER

## 2024-06-08 NOTE — PLAN OF CARE
Problem: Pain  Goal: Verbalizes/displays adequate comfort level or baseline comfort level  Outcome: Not Progressing     Problem: ABCDS Injury Assessment  Goal: Absence of physical injury  Outcome: Not Progressing

## 2024-06-08 NOTE — PROGRESS NOTES
06/08/24 0949   Treatment   Treatment Type HHN;Manual P&PD  (Percussion completed. Patient tolerated well.)

## 2024-06-08 NOTE — PROGRESS NOTES
Pt SOB, labored breathing, and increased RR during walk. STAT chest xray ordered per Dr. Milan. Pt returned to bed.

## 2024-06-08 NOTE — PROGRESS NOTES
CVTS Cardiothoracic Progress Note:    Surgery: S/P robotic assisted right lower lobe with mediastinal lymph node dissection on 5/22/24 with Dr. Milan. Path demonstrated carcinoid tumor and 1 LN was positive.   Surgeon: Dr. Milan  POD #: 15     Re-Admitted on to the ED on 6/2 with uncontrolled chest pain     Procedure: IR guided right chest tube placement   Physician: Dr. spaulding  POD#: 3    Subjective:   6/5: Patient extremely anxious (wanting to go home) sitting up in bed on RA. Alert and oriented x 4      6/6: Patient seen and examined this morning; states she hasn't been able to have BM yet; some discomfort from CT placement yesterday.      6/7: patient seen and examined this morning; siting in chair, pain much improved; repeat CXR with no significant change from previous study; plan repeat CT Chest     6/8: patient up sitting in chair, repeat CT Chest demonstrates persistent but slightly smaller loculated fluid collection.     Vital Signs: /72   Pulse 96   Temp 97.1 °F (36.2 °C) (Temporal)   Resp 18   Ht 1.651 m (5' 5\")   Wt 99.8 kg (220 lb 0.3 oz)   SpO2 92%   BMI 36.61 kg/m²  O2 Flow Rate (L/min): 2 L/min     LABORATORY DATA:    CBC:   Recent Labs     06/06/24  0430   WBC 10.5   HGB 10.0*   HCT 28.6*   MCV 86.6   *     BMP:   Recent Labs     06/06/24  1030 06/07/24  0950    136   K 3.7 3.9   CL 99 100   CO2 23 22   BUN 16 16   CREATININE 0.6 0.6     MG:  No results for input(s): \"MG\" in the last 72 hours.   Cardiac Enzymes: No results for input(s): \"CKTOTAL\", \"CKMB\", \"CKMBINDEX\", \"TROPONINI\" in the last 72 hours.  PT/INR: No results for input(s): \"PROTIME\", \"INR\" in the last 72 hours.  APTT: No results for input(s): \"APTT\" in the last 72 hours.    Physicial Exam:   General appearance: nervous / anxious  Lungs:  CTA left side; right lower lung diminished   Heart: SR on monitor  Abdomen: +bowel sounds, non-tender   Wound/Incisions:robotic port sites CDI    Extremities: BLE pulses intact; moderate LE edema bilaterally  Neurological: A/O x4      Assessment  Carcinoid Tumor s/p robotic assisted right lower lobe with mediastinal lymph node dissection on 5/22/24 with Dr. Milan   Plejuve   Tobacco Abuse  Hydropneumothorax  Centrilobular emphysema    Cardiac Meds:  ASA 81 mg daily  Lipitor 40 mg nightly      Non-cardiac Meds:  Lovenox 30 mg Sub0Q daily  Gabapentin 300 mg TID  Motrin 400 mg TID  Duoneb  Lactobacillus 1 capsule 2 times daily  IV Zosyn 3,375 mg q 8 hrs  Xanax 0.5 mg PRN TID    Today's Plan:  D/c pigtail >> repeat CXR  Abx per ID  0.5 Xanax PRN TID   ambulate in hallway  Possible discharge tomorrow from a CT standpoint

## 2024-06-08 NOTE — PROGRESS NOTES
Pulmonary and Critical Care Medicine    CC: f/u right hydropneumothorax    Date: 2024  Admit Date:  2024  Consult Requesting Physician: Sonya Milan MD     HPI     This is a 52 y/o F w/ a hx of emphysema, tobacco use, s/p RLL/RMLobectomy, pathology positive for carcinoid, presented with right hydropneumothorax s/p chest tube.     Overnight:  No air leak  Minimal chest tube output  Sitting on chair  Nervous about removing chest tube due to pain     ROS: negative except stated above    OBJECTIVE DATA       PHYSICAL EXAM:   Temp  Av.2 °F (36.8 °C)  Min: 98 °F (36.7 °C)  Max: 98.4 °F (36.9 °C)  Pulse  Av  Min: 90  Max: 98  BP  Min: 109/70  Max: 119/73  SpO2  Av %  Min: 94 %  Max: 100 %    General: NAD  Neuro: A0x3  Lung: CTA, equal non labored  Heart: regular rate    MEDICATIONS: Reviewed  Scheduled Meds:   ferrous sulfate  325 mg Oral TID WC    ipratropium 0.5 mg-albuterol 2.5 mg  1 Dose Inhalation Q4H WA RT    gabapentin  300 mg Oral TID    ibuprofen  400 mg Oral TID WC    lactobacillus  1 capsule Oral BID WC    sodium chloride flush  5-40 mL IntraVENous 2 times per day    atorvastatin  40 mg Oral Nightly    enoxaparin  30 mg SubCUTAneous BID    aspirin  81 mg Oral Daily    piperacillin-tazobactam  3,375 mg IntraVENous q8h     Continuous Infusions:   sodium chloride 10 mL/hr at 24 2019     PRN Meds:.senna, ketorolac, traMADol, morphine, sodium chloride flush, sodium chloride, potassium chloride **OR** potassium alternative oral replacement **OR** potassium chloride, magnesium sulfate, ondansetron **OR** ondansetron, acetaminophen **OR** acetaminophen, polyethylene glycol, oxyCODONE, oxyCODONE     LABS: Reviewed.   Recent Labs     24  1030 24  0950    136   K 3.7 3.9   CL 99 100   CO2 23 22   BUN 16 16   CREATININE 0.6 0.6     Recent Labs     24  0430   WBC 10.5   HGB 10.0*   HCT 28.6*   MCV 86.6   *     Lab Results   Component Value Date/Time    PROTIME

## 2024-06-09 ENCOUNTER — APPOINTMENT (OUTPATIENT)
Dept: GENERAL RADIOLOGY | Age: 54
DRG: 871 | End: 2024-06-09
Payer: COMMERCIAL

## 2024-06-09 PROBLEM — I48.91 ATRIAL FIBRILLATION (HCC): Status: ACTIVE | Noted: 2024-06-09

## 2024-06-09 PROBLEM — D64.9 ANEMIA: Status: ACTIVE | Noted: 2024-06-09

## 2024-06-09 PROBLEM — E87.70 HYPERVOLEMIA: Status: ACTIVE | Noted: 2024-06-09

## 2024-06-09 PROBLEM — R00.0 TACHYCARDIA: Status: ACTIVE | Noted: 2024-06-09

## 2024-06-09 LAB
ANION GAP SERPL CALCULATED.3IONS-SCNC: 14 MMOL/L (ref 3–16)
BASOPHILS # BLD: 0.1 K/UL (ref 0–0.2)
BASOPHILS NFR BLD: 0.5 %
BUN SERPL-MCNC: 15 MG/DL (ref 7–20)
CALCIUM SERPL-MCNC: 8.8 MG/DL (ref 8.3–10.6)
CHLORIDE SERPL-SCNC: 101 MMOL/L (ref 99–110)
CO2 SERPL-SCNC: 22 MMOL/L (ref 21–32)
CREAT SERPL-MCNC: 0.6 MG/DL (ref 0.6–1.1)
DEPRECATED RDW RBC AUTO: 14 % (ref 12.4–15.4)
EKG ATRIAL RATE: 150 BPM
EKG ATRIAL RATE: 60 BPM
EKG DIAGNOSIS: NORMAL
EKG DIAGNOSIS: NORMAL
EKG P AXIS: 15 DEGREES
EKG P-R INTERVAL: 122 MS
EKG Q-T INTERVAL: 308 MS
EKG Q-T INTERVAL: 416 MS
EKG QRS DURATION: 90 MS
EKG QRS DURATION: 92 MS
EKG QTC CALCULATION (BAZETT): 416 MS
EKG QTC CALCULATION (BAZETT): 488 MS
EKG R AXIS: -1 DEGREES
EKG R AXIS: 1 DEGREES
EKG T AXIS: 138 DEGREES
EKG T AXIS: 14 DEGREES
EKG VENTRICULAR RATE: 151 BPM
EKG VENTRICULAR RATE: 60 BPM
EOSINOPHIL # BLD: 0.3 K/UL (ref 0–0.6)
EOSINOPHIL NFR BLD: 2.5 %
FERRITIN SERPL IA-MCNC: 634.1 NG/ML (ref 15–150)
GFR SERPLBLD CREATININE-BSD FMLA CKD-EPI: >90 ML/MIN/{1.73_M2}
GLUCOSE SERPL-MCNC: 134 MG/DL (ref 70–99)
HCT VFR BLD AUTO: 26 % (ref 36–48)
HGB BLD-MCNC: 9.2 G/DL (ref 12–16)
IRON SATN MFR SERPL: 11 % (ref 15–50)
IRON SERPL-MCNC: 24 UG/DL (ref 37–145)
LYMPHOCYTES # BLD: 1.1 K/UL (ref 1–5.1)
LYMPHOCYTES NFR BLD: 10.4 %
MAGNESIUM SERPL-MCNC: 2 MG/DL (ref 1.8–2.4)
MCH RBC QN AUTO: 30.6 PG (ref 26–34)
MCHC RBC AUTO-ENTMCNC: 35.3 G/DL (ref 31–36)
MCV RBC AUTO: 86.7 FL (ref 80–100)
MONOCYTES # BLD: 1.5 K/UL (ref 0–1.3)
MONOCYTES NFR BLD: 13.3 %
NEUTROPHILS # BLD: 8 K/UL (ref 1.7–7.7)
NEUTROPHILS NFR BLD: 73.3 %
PLATELET # BLD AUTO: 492 K/UL (ref 135–450)
PMV BLD AUTO: 7.2 FL (ref 5–10.5)
POTASSIUM SERPL-SCNC: 3.2 MMOL/L (ref 3.5–5.1)
POTASSIUM SERPL-SCNC: 3.9 MMOL/L (ref 3.5–5.1)
RBC # BLD AUTO: 3 M/UL (ref 4–5.2)
SODIUM SERPL-SCNC: 137 MMOL/L (ref 136–145)
TIBC SERPL-MCNC: 221 UG/DL (ref 260–445)
WBC # BLD AUTO: 10.9 K/UL (ref 4–11)

## 2024-06-09 PROCEDURE — 99223 1ST HOSP IP/OBS HIGH 75: CPT | Performed by: INTERNAL MEDICINE

## 2024-06-09 PROCEDURE — 6360000002 HC RX W HCPCS

## 2024-06-09 PROCEDURE — 2580000003 HC RX 258: Performed by: INTERNAL MEDICINE

## 2024-06-09 PROCEDURE — 94640 AIRWAY INHALATION TREATMENT: CPT

## 2024-06-09 PROCEDURE — 6360000002 HC RX W HCPCS: Performed by: INTERNAL MEDICINE

## 2024-06-09 PROCEDURE — 80048 BASIC METABOLIC PNL TOTAL CA: CPT

## 2024-06-09 PROCEDURE — 83540 ASSAY OF IRON: CPT

## 2024-06-09 PROCEDURE — 83735 ASSAY OF MAGNESIUM: CPT

## 2024-06-09 PROCEDURE — 71045 X-RAY EXAM CHEST 1 VIEW: CPT

## 2024-06-09 PROCEDURE — 94668 MNPJ CHEST WALL SBSQ: CPT

## 2024-06-09 PROCEDURE — 2000000000 HC ICU R&B

## 2024-06-09 PROCEDURE — 36415 COLL VENOUS BLD VENIPUNCTURE: CPT

## 2024-06-09 PROCEDURE — 82728 ASSAY OF FERRITIN: CPT

## 2024-06-09 PROCEDURE — 6370000000 HC RX 637 (ALT 250 FOR IP): Performed by: INTERNAL MEDICINE

## 2024-06-09 PROCEDURE — 85025 COMPLETE CBC W/AUTO DIFF WBC: CPT

## 2024-06-09 PROCEDURE — 84132 ASSAY OF SERUM POTASSIUM: CPT

## 2024-06-09 PROCEDURE — 94761 N-INVAS EAR/PLS OXIMETRY MLT: CPT

## 2024-06-09 PROCEDURE — 93005 ELECTROCARDIOGRAM TRACING: CPT | Performed by: THORACIC SURGERY (CARDIOTHORACIC VASCULAR SURGERY)

## 2024-06-09 PROCEDURE — 6370000000 HC RX 637 (ALT 250 FOR IP)

## 2024-06-09 PROCEDURE — 6370000000 HC RX 637 (ALT 250 FOR IP): Performed by: THORACIC SURGERY (CARDIOTHORACIC VASCULAR SURGERY)

## 2024-06-09 PROCEDURE — 83550 IRON BINDING TEST: CPT

## 2024-06-09 RX ORDER — FUROSEMIDE 10 MG/ML
20 INJECTION INTRAMUSCULAR; INTRAVENOUS ONCE
Status: COMPLETED | OUTPATIENT
Start: 2024-06-09 | End: 2024-06-09

## 2024-06-09 RX ORDER — POTASSIUM CHLORIDE 20 MEQ/1
40 TABLET, EXTENDED RELEASE ORAL 3 TIMES DAILY
Status: DISCONTINUED | OUTPATIENT
Start: 2024-06-09 | End: 2024-06-10 | Stop reason: HOSPADM

## 2024-06-09 RX ORDER — IPRATROPIUM BROMIDE AND ALBUTEROL SULFATE 2.5; .5 MG/3ML; MG/3ML
1 SOLUTION RESPIRATORY (INHALATION)
Status: DISCONTINUED | OUTPATIENT
Start: 2024-06-09 | End: 2024-06-10 | Stop reason: HOSPADM

## 2024-06-09 RX ORDER — ALBUTEROL SULFATE 2.5 MG/3ML
2.5 SOLUTION RESPIRATORY (INHALATION) EVERY 4 HOURS PRN
Status: DISCONTINUED | OUTPATIENT
Start: 2024-06-09 | End: 2024-06-10 | Stop reason: HOSPADM

## 2024-06-09 RX ADMIN — POTASSIUM BICARBONATE 40 MEQ: 782 TABLET, EFFERVESCENT ORAL at 05:09

## 2024-06-09 RX ADMIN — PIPERACILLIN AND TAZOBACTAM 3375 MG: 3; .375 INJECTION, POWDER, LYOPHILIZED, FOR SOLUTION INTRAVENOUS at 14:14

## 2024-06-09 RX ADMIN — GABAPENTIN 300 MG: 300 CAPSULE ORAL at 21:23

## 2024-06-09 RX ADMIN — POTASSIUM CHLORIDE 40 MEQ: 1500 TABLET, EXTENDED RELEASE ORAL at 09:23

## 2024-06-09 RX ADMIN — MORPHINE SULFATE 2 MG: 2 INJECTION, SOLUTION INTRAMUSCULAR; INTRAVENOUS at 03:49

## 2024-06-09 RX ADMIN — POTASSIUM CHLORIDE 40 MEQ: 1500 TABLET, EXTENDED RELEASE ORAL at 14:10

## 2024-06-09 RX ADMIN — IPRATROPIUM BROMIDE AND ALBUTEROL SULFATE 1 DOSE: .5; 3 SOLUTION RESPIRATORY (INHALATION) at 21:49

## 2024-06-09 RX ADMIN — FUROSEMIDE 20 MG: 10 INJECTION, SOLUTION INTRAMUSCULAR; INTRAVENOUS at 12:14

## 2024-06-09 RX ADMIN — ASPIRIN 81 MG: 81 TABLET, CHEWABLE ORAL at 08:32

## 2024-06-09 RX ADMIN — IPRATROPIUM BROMIDE AND ALBUTEROL SULFATE 1 DOSE: .5; 3 SOLUTION RESPIRATORY (INHALATION) at 08:54

## 2024-06-09 RX ADMIN — IBUPROFEN 400 MG: 400 TABLET, FILM COATED ORAL at 12:13

## 2024-06-09 RX ADMIN — AMIODARONE HYDROCHLORIDE 0.5 MG/MIN: 50 INJECTION, SOLUTION INTRAVENOUS at 22:48

## 2024-06-09 RX ADMIN — ALPRAZOLAM 0.5 MG: 0.5 TABLET ORAL at 21:34

## 2024-06-09 RX ADMIN — IPRATROPIUM BROMIDE AND ALBUTEROL SULFATE 1 DOSE: .5; 3 SOLUTION RESPIRATORY (INHALATION) at 12:30

## 2024-06-09 RX ADMIN — ATORVASTATIN CALCIUM 40 MG: 40 TABLET, FILM COATED ORAL at 21:23

## 2024-06-09 RX ADMIN — Medication 1 CAPSULE: at 08:32

## 2024-06-09 RX ADMIN — IBUPROFEN 400 MG: 400 TABLET, FILM COATED ORAL at 16:37

## 2024-06-09 RX ADMIN — PIPERACILLIN AND TAZOBACTAM 3375 MG: 3; .375 INJECTION, POWDER, LYOPHILIZED, FOR SOLUTION INTRAVENOUS at 05:33

## 2024-06-09 RX ADMIN — POLYETHYLENE GLYCOL 3350 17 G: 17 POWDER, FOR SOLUTION ORAL at 14:45

## 2024-06-09 RX ADMIN — SENNOSIDES 8.6 MG: 8.6 TABLET, FILM COATED ORAL at 14:45

## 2024-06-09 RX ADMIN — Medication 10 ML: at 08:33

## 2024-06-09 RX ADMIN — AMIODARONE HYDROCHLORIDE 1 MG/MIN: 50 INJECTION, SOLUTION INTRAVENOUS at 16:24

## 2024-06-09 RX ADMIN — GABAPENTIN 300 MG: 300 CAPSULE ORAL at 08:32

## 2024-06-09 RX ADMIN — POTASSIUM CHLORIDE 40 MEQ: 1500 TABLET, EXTENDED RELEASE ORAL at 21:23

## 2024-06-09 RX ADMIN — ENOXAPARIN SODIUM 30 MG: 100 INJECTION SUBCUTANEOUS at 08:31

## 2024-06-09 RX ADMIN — PIPERACILLIN AND TAZOBACTAM 3375 MG: 3; .375 INJECTION, POWDER, LYOPHILIZED, FOR SOLUTION INTRAVENOUS at 21:44

## 2024-06-09 RX ADMIN — GABAPENTIN 300 MG: 300 CAPSULE ORAL at 14:10

## 2024-06-09 RX ADMIN — AMIODARONE HYDROCHLORIDE 150 MG: 50 INJECTION, SOLUTION INTRAVENOUS at 16:10

## 2024-06-09 RX ADMIN — FERROUS SULFATE TAB 325 MG (65 MG ELEMENTAL FE) 325 MG: 325 (65 FE) TAB at 16:38

## 2024-06-09 RX ADMIN — ALBUTEROL SULFATE 2.5 MG: 2.5 SOLUTION RESPIRATORY (INHALATION) at 17:30

## 2024-06-09 RX ADMIN — FERROUS SULFATE TAB 325 MG (65 MG ELEMENTAL FE) 325 MG: 325 (65 FE) TAB at 08:32

## 2024-06-09 RX ADMIN — ENOXAPARIN SODIUM 30 MG: 100 INJECTION SUBCUTANEOUS at 21:23

## 2024-06-09 RX ADMIN — FERROUS SULFATE TAB 325 MG (65 MG ELEMENTAL FE) 325 MG: 325 (65 FE) TAB at 12:13

## 2024-06-09 RX ADMIN — ALPRAZOLAM 0.5 MG: 0.5 TABLET ORAL at 03:18

## 2024-06-09 RX ADMIN — Medication 1 CAPSULE: at 16:38

## 2024-06-09 ASSESSMENT — PAIN DESCRIPTION - DESCRIPTORS: DESCRIPTORS: HEAVINESS

## 2024-06-09 ASSESSMENT — PAIN SCALES - GENERAL: PAINLEVEL_OUTOF10: 7

## 2024-06-09 ASSESSMENT — PAIN DESCRIPTION - LOCATION: LOCATION: CHEST

## 2024-06-09 ASSESSMENT — PAIN DESCRIPTION - ORIENTATION: ORIENTATION: MID

## 2024-06-09 NOTE — PROGRESS NOTES
Patient with episode of Afib RVR sustained. EKG complete. Call placed to Dr. Nolan. Order received for amiodarone bolus and drip. Repeat potassium level.     Estrellita Miguel RN

## 2024-06-09 NOTE — PROGRESS NOTES
06/09/24 1233   RT Protocol   History Pulmonary Disease 2   Respiratory pattern 4   Breath sounds 2   Cough 0   Bronchodilator Assessment Score 8

## 2024-06-09 NOTE — CONSULTS
[facility]  Advanced CHF/Pulmonary Hypertension   Cardiac Evaluation      Cici Hernández  YOB: 1970    Requesting PHysician:  Dr. Milan      Chief Complaint   Patient presents with    Chest Pain     Chest pain started last night. S/p 2 right lobectomy on 5/22/24.        History of Present Illness:  Cici Hernández is a 52 yo female with emphysema, former smoker, right lower lobe lung nodule positive for atypical cells who underwent right lower lobe/right middle lobe lobectomy with mediastinal lymph node dissection on 5/22.  Pathology showed typical carcinoid, neuroendocrine tumor grade 1.  Lymph nodes were positive for carcinoid.  She had an elevated CRP of 250 and ESR of 91.  She was discharged on 5/31 but readmitted 6/1 due to right sided chest pain and difficulty breathing.  She had evidence of right hydropneumothorax, chest tube was placed and removed yesterday.  She has been on antibiotics.    She developed tachycardia last evening.  Review of rhythm strips shows sinus tachycardia with frequent PACs.  There is one strip that suggests atrial fibrillation.  She is in sinus tachycardia this morning with rates in 100-110s.  She denies chest pain or shortness of breath.  She has evidence of weight gain since surgery, suggesting volume overload.  Her potassium is low at 3.2 today.  I am asked to see her for the tachycardia.  Preop echo was unremarkable.    EKG:  NSR, nonspecific ST-T wave changes    Labs:  Sodium 137  K 3.2  BUN/Cre 15/0.6  H/H 9.2/26.0    CT chest: 6/7/24:  IMPRESSION:  Interval near complete resolution of the anterior pneumothorax.     Persistent but slightly smaller loculated fluid collection in the posterior  CP angle with small dots of air.     No other significant interval change.    CXR;  today:  IMPRESSION:  1. Worsening right consolidation.    Echo:  5/6/24:    Left Ventricle: Normal left ventricular systolic function with a visually estimated EF of 55 - 60%. Left  ventricle size is normal. Normal wall thickness. Normal wall motion. Global longitudinal strain is normal. Diastolic dysfunction present with normal LV EF.    Mitral Valve: Mild regurgitation with a centrally directed jet.    Image quality is good.    Meds prior to admission:  No cardiac meds    Meds now:  Zosyn  Aspirin 81  Lipitor 40 qd  lovenox      Allergies   Allergen Reactions    Naproxen Anaphylaxis, Hives and Shortness Of Breath     Current Facility-Administered Medications   Medication Dose Route Frequency Provider Last Rate Last Admin    potassium chloride (KLOR-CON M) extended release tablet 40 mEq  40 mEq Oral TID Sonya Milan MD        ALPRAZolam (XANAX) tablet 0.5 mg  0.5 mg Oral Q8H PRN Sonya Milan MD   0.5 mg at 06/09/24 0318    ferrous sulfate (IRON 325) tablet 325 mg  325 mg Oral TID  Sonya Milan MD   325 mg at 06/09/24 0832    ipratropium 0.5 mg-albuterol 2.5 mg (DUONEB) nebulizer solution 1 Dose  1 Dose Inhalation Q4H WA RT Gareth Marie PA   1 Dose at 06/09/24 0854    gabapentin (NEURONTIN) capsule 300 mg  300 mg Oral TID Gareth Marie PA   300 mg at 06/09/24 0832    senna (SENOKOT) tablet 8.6 mg  1 tablet Oral Nightly PRN Gareth Marie PA        ketorolac (TORADOL) injection 30 mg  30 mg IntraVENous Q6H PRN Gareth Marie PA   30 mg at 06/08/24 0659    ibuprofen (ADVIL;MOTRIN) tablet 400 mg  400 mg Oral TID  Harika Salazar MD   400 mg at 06/08/24 1716    lactobacillus (CULTURELLE) capsule 1 capsule  1 capsule Oral BID  Vinny Griggs MD   1 capsule at 06/09/24 0832    traMADol (ULTRAM) tablet 50 mg  50 mg Oral Q6H PRN Isaiah Santo MD   50 mg at 06/07/24 2244    morphine (PF) injection 2 mg  2 mg IntraVENous Q4H PRN Isaiah Santo MD   2 mg at 06/09/24 0349    sodium chloride flush 0.9 % injection 5-40 mL  5-40 mL IntraVENous 2 times per day Isaiah Santo MD   10 mL at 06/09/24 0833    sodium chloride flush 0.9 % injection 5-40 mL  5-40 mL IntraVENous PRN Isaiah Santo,    05/31/24 114/82   05/06/24 130/76     Constitutional and General Appearance:   WD/WN in NAD, pale appearing  HEENT:  NC/AT  VICTOR M  No problems with hearing  Skin:  Warm, dry  Respiratory:  Normal excursion and expansion without use of accessory muscles  Resp Auscultation: Normal breath sounds without dullness  Cardiovascular:  The apical impulses not displaced  Heart tones are crisp and normal  Cervical veins are not engorged  The carotid upstroke is normal in amplitude and contour without delay or bruit  JVP 12 cm H2O  Regular tachycardia with nl S1 and S2 without m,r,g  Peripheral pulses are symmetrical and full  There is no clubbing, cyanosis of the extremities.  Trace bilateral edema  Femoral Arteries: 2+ and equal  Pedal Pulses: 2+ and equal   Neck:  No thyromegaly  Abdomen:  No masses or tenderness  Liver/Spleen: No Abnormalities Noted  Neurological/Psychiatric:  Alert and oriented in all spheres  Moves all extremities well  Exhibits normal gait balance and coordination  No abnormalities of mood, affect, memory, mentation, or behavior are noted    Labs were reviewed including labs from other hospital systems through [facility].  Cardiac testing was reviewed including echos, nuclear scans, cardiac catheterization, including from other hospital systems through [facility].    Assessment:    1. Chest pain, unspecified type    2. Elevated sed rate    3. Elevated C-reactive protein (CRP)     4.  Volume overload post-op  5.  Tachycardia with one short episode of atrial fibrillation, frequent PACs  6.  Recent lung surgery with lobectomy  7.  Recent hydropneumothorax.  Chest tube removed yesterday  8.  Anemia, rule out iron deficiency    Plan:   I suspect that her tachycardia is related to post-op status and volume overload.  She only had one strip of brief atrial fibrillation.  Suggest starting IV lasix 20 mg today.  This should help with tachycardia  No need for beta blockade at this time  Replace

## 2024-06-09 NOTE — PLAN OF CARE
VSS. NSR on monitor. Patient had episode of Afib RVR today- placed on amiodarone drip. Consult placed for cardiology/  Patient is alert and oriented, denies any pain. Patient on regular diet, poor appetite Turns self well for pressure ulcer prevention. Medication for DVT prophylaxis. Free from any injury. Patient and family updated on plan of care. Will continue plan of care.      Problem: Discharge Planning  Goal: Discharge to home or other facility with appropriate resources  Outcome: Progressing     Problem: Pain  Goal: Verbalizes/displays adequate comfort level or baseline comfort level  Outcome: Progressing     Problem: ABCDS Injury Assessment  Goal: Absence of physical injury  Outcome: Progressing     Problem: Cardiovascular - Adult  Goal: Maintains optimal cardiac output and hemodynamic stability  Outcome: Progressing  Goal: Absence of cardiac dysrhythmias or at baseline  Outcome: Progressing     Problem: Safety - Adult  Goal: Free from fall injury  Outcome: Progressing     Problem: Infection - Adult  Goal: Absence of infection at discharge  Outcome: Progressing  Goal: Absence of infection during hospitalization  Outcome: Progressing

## 2024-06-09 NOTE — PROGRESS NOTES
Spoke to Dr Milan over the phone and was aware of patient's current condition. Noted phone call from her. No new orders. Will continue to monitor this patient. Pt currently asleep.

## 2024-06-09 NOTE — PROGRESS NOTES
CVTS Cardiothoracic Progress Note:    Surgery: S/P robotic assisted right lower lobe with mediastinal lymph node dissection on 5/22/24 with Dr. Milan. Path demonstrated carcinoid tumor and 1 LN was positive.   Surgeon: Dr. Milan  POD #: 16     Re-Admitted on to the ED on 6/2 with uncontrolled chest pain     Procedure: IR guided right chest tube placement   Physician: Dr. spaulding  POD#: 4    Subjective:   6/5: Patient extremely anxious (wanting to go home) sitting up in bed on RA. Alert and oriented x 4      6/6: Patient seen and examined this morning; states she hasn't been able to have BM yet; some discomfort from CT placement yesterday.      6/7: patient seen and examined this morning; siting in chair, pain much improved; repeat CXR with no significant change from previous study; plan repeat CT Chest     6/8: patient up sitting in chair, repeat CT Chest demonstrates persistent but slightly smaller loculated fluid collection.     6/9: patient sitting up in chair, anxious, HR's in the 100's overnight; K 3.2 this morning; states she did not take her medication although it was given    Vital Signs: /65   Pulse 62   Temp 99.6 °F (37.6 °C) (Oral)   Resp 18   Ht 1.651 m (5' 5\")   Wt 99.2 kg (218 lb 11.1 oz)   SpO2 94%   BMI 36.39 kg/m²  O2 Flow Rate (L/min): 2 L/min     LABORATORY DATA:    CBC:   Recent Labs     06/09/24  0405   WBC 10.9   HGB 9.2*   HCT 26.0*   MCV 86.7   *       BMP:   Recent Labs     06/07/24  0950 06/09/24  0405    137   K 3.9 3.2*    101   CO2 22 22   BUN 16 15   CREATININE 0.6 0.6       MG:    Recent Labs     06/09/24  0405   MG 2.00      Cardiac Enzymes: No results for input(s): \"CKTOTAL\", \"CKMB\", \"CKMBINDEX\", \"TROPONINI\" in the last 72 hours.  PT/INR: No results for input(s): \"PROTIME\", \"INR\" in the last 72 hours.  APTT: No results for input(s): \"APTT\" in the last 72 hours.    Physicial Exam:   General appearance: anxious  Lungs:  CTA

## 2024-06-09 NOTE — RT PROTOCOL NOTE
RT Inhaler-Nebulizer Bronchodilator Protocol Note    There is a bronchodilator order in the chart from a provider indicating to follow the RT Bronchodilator Protocol and there is an “Initiate RT Inhaler-Nebulizer Bronchodilator Protocol” order as well (see protocol at bottom of note).    CXR Findings:  XR CHEST PORTABLE    Result Date: 6/9/2024  1. Worsening right consolidation.     XR CHEST PORTABLE    Result Date: 6/8/2024  1. Progressing opacities presumably related atelectasis medial lower right lung and infiltrate or atelectasis right parahilar region and lower right lung.  Pneumonia is a consideration.  Possible underlying mucous plugging. 2. Left lung remains clear. 3. No pneumothorax evident.     XR CHEST PORTABLE    Result Date: 6/8/2024  1. Unchanged moderate volume right pleural effusion with areas of consolidation in the upper, medial mid and across the lower right lung. 2. No pneumothorax evident. 3. Left lung appears clear.       The findings from the last RT Protocol Assessment were as follows:   History Pulmonary Disease: Chronic pulmonary disease  Respiratory Pattern: Mild dyspnea at rest, irregular pattern, or RR 21-25 bpm  Breath Sounds: Slightly diminished and/or crackles  Cough: Strong, spontaneous, non-productive  Indication for Bronchodilator Therapy:    Bronchodilator Assessment Score: 8    Aerosolized bronchodilator medication orders have been revised according to the RT Inhaler-Nebulizer Bronchodilator Protocol below.    Respiratory Therapist to perform RT Therapy Protocol Assessment initially then follow the protocol.  Repeat RT Therapy Protocol Assessment PRN for score 0-3 or on second treatment, BID, and PRN for scores above 3.    No Indications - adjust the frequency to every 6 hours PRN wheezing or bronchospasm, if no treatments needed after 48 hours then discontinue using Per Protocol order mode.     If indication present, adjust the RT bronchodilator orders based on the

## 2024-06-09 NOTE — PROGRESS NOTES
Patient went to Sinus Tachycardia @ rate of 100-120's. Oncall CVTS called in to inform. Awaiting call back. Patient converts back and forth to sinus rhythm, sinus tachy 100-120's, a fib. BP stable at 90/74. No chest pain as verbalized.

## 2024-06-10 ENCOUNTER — APPOINTMENT (OUTPATIENT)
Dept: GENERAL RADIOLOGY | Age: 54
DRG: 871 | End: 2024-06-10
Payer: COMMERCIAL

## 2024-06-10 VITALS
DIASTOLIC BLOOD PRESSURE: 62 MMHG | SYSTOLIC BLOOD PRESSURE: 99 MMHG | RESPIRATION RATE: 16 BRPM | WEIGHT: 237.22 LBS | TEMPERATURE: 98.6 F | BODY MASS INDEX: 39.52 KG/M2 | HEIGHT: 65 IN | OXYGEN SATURATION: 94 % | HEART RATE: 76 BPM

## 2024-06-10 DIAGNOSIS — E87.6 HYPOKALEMIA: Primary | ICD-10-CM

## 2024-06-10 PROBLEM — K52.1 ANTIBIOTIC-ASSOCIATED DIARRHEA: Status: ACTIVE | Noted: 2024-06-10

## 2024-06-10 PROBLEM — T36.95XA ANTIBIOTIC-ASSOCIATED DIARRHEA: Status: ACTIVE | Noted: 2024-06-10

## 2024-06-10 PROBLEM — J94.8 HYDROPNEUMOTHORAX: Status: ACTIVE | Noted: 2024-06-10

## 2024-06-10 PROBLEM — I48.0 PAF (PAROXYSMAL ATRIAL FIBRILLATION) (HCC): Status: ACTIVE | Noted: 2024-06-09

## 2024-06-10 LAB
FUNGUS BLD CULT: NORMAL
POTASSIUM SERPL-SCNC: 3.7 MMOL/L (ref 3.5–5.1)
PROCALCITONIN SERPL IA-MCNC: 0.08 NG/ML (ref 0–0.15)

## 2024-06-10 PROCEDURE — 94640 AIRWAY INHALATION TREATMENT: CPT

## 2024-06-10 PROCEDURE — 2580000003 HC RX 258: Performed by: INTERNAL MEDICINE

## 2024-06-10 PROCEDURE — 71045 X-RAY EXAM CHEST 1 VIEW: CPT

## 2024-06-10 PROCEDURE — 6370000000 HC RX 637 (ALT 250 FOR IP): Performed by: INTERNAL MEDICINE

## 2024-06-10 PROCEDURE — 84132 ASSAY OF SERUM POTASSIUM: CPT

## 2024-06-10 PROCEDURE — 99223 1ST HOSP IP/OBS HIGH 75: CPT | Performed by: INTERNAL MEDICINE

## 2024-06-10 PROCEDURE — 99233 SBSQ HOSP IP/OBS HIGH 50: CPT | Performed by: INTERNAL MEDICINE

## 2024-06-10 PROCEDURE — 6370000000 HC RX 637 (ALT 250 FOR IP)

## 2024-06-10 PROCEDURE — 36415 COLL VENOUS BLD VENIPUNCTURE: CPT

## 2024-06-10 PROCEDURE — 94761 N-INVAS EAR/PLS OXIMETRY MLT: CPT

## 2024-06-10 PROCEDURE — 94668 MNPJ CHEST WALL SBSQ: CPT

## 2024-06-10 PROCEDURE — 6360000002 HC RX W HCPCS: Performed by: INTERNAL MEDICINE

## 2024-06-10 PROCEDURE — 99024 POSTOP FOLLOW-UP VISIT: CPT

## 2024-06-10 PROCEDURE — 6370000000 HC RX 637 (ALT 250 FOR IP): Performed by: THORACIC SURGERY (CARDIOTHORACIC VASCULAR SURGERY)

## 2024-06-10 PROCEDURE — 84145 PROCALCITONIN (PCT): CPT

## 2024-06-10 RX ORDER — AMIODARONE HYDROCHLORIDE 200 MG/1
TABLET ORAL
Qty: 104 TABLET | Refills: 0 | Status: SHIPPED | OUTPATIENT
Start: 2024-06-10 | End: 2024-09-08

## 2024-06-10 RX ORDER — LACTOBACILLUS RHAMNOSUS GG 10B CELL
1 CAPSULE ORAL 2 TIMES DAILY
Qty: 30 CAPSULE | Refills: 0 | Status: SHIPPED | OUTPATIENT
Start: 2024-06-10 | End: 2024-06-25

## 2024-06-10 RX ORDER — FERROUS SULFATE 325(65) MG
325 TABLET ORAL 2 TIMES DAILY WITH MEALS
Qty: 60 TABLET | Refills: 0 | Status: SHIPPED | OUTPATIENT
Start: 2024-06-10 | End: 2024-07-10

## 2024-06-10 RX ORDER — CEFUROXIME AXETIL 500 MG/1
500 TABLET ORAL EVERY 12 HOURS SCHEDULED
Qty: 14 TABLET | Refills: 0 | Status: SHIPPED | OUTPATIENT
Start: 2024-06-10 | End: 2024-06-17

## 2024-06-10 RX ORDER — METRONIDAZOLE 250 MG/1
500 TABLET ORAL EVERY 8 HOURS SCHEDULED
Status: DISCONTINUED | OUTPATIENT
Start: 2024-06-10 | End: 2024-06-10 | Stop reason: HOSPADM

## 2024-06-10 RX ORDER — IBUPROFEN 400 MG/1
400 TABLET ORAL
Qty: 90 TABLET | Refills: 0 | Status: SHIPPED | OUTPATIENT
Start: 2024-06-10 | End: 2024-06-19 | Stop reason: ALTCHOICE

## 2024-06-10 RX ORDER — METRONIDAZOLE 500 MG/1
500 TABLET ORAL EVERY 8 HOURS SCHEDULED
Qty: 21 TABLET | Refills: 0 | Status: SHIPPED | OUTPATIENT
Start: 2024-06-10 | End: 2024-06-17

## 2024-06-10 RX ORDER — GABAPENTIN 300 MG/1
300 CAPSULE ORAL 3 TIMES DAILY
Qty: 90 CAPSULE | Refills: 0 | Status: SHIPPED | OUTPATIENT
Start: 2024-06-10 | End: 2024-06-19 | Stop reason: ALTCHOICE

## 2024-06-10 RX ORDER — TRAMADOL HYDROCHLORIDE 50 MG/1
50 TABLET ORAL EVERY 6 HOURS PRN
Qty: 28 TABLET | Refills: 0 | Status: SHIPPED | OUTPATIENT
Start: 2024-06-10 | End: 2024-06-17

## 2024-06-10 RX ORDER — ATORVASTATIN CALCIUM 40 MG/1
40 TABLET, FILM COATED ORAL NIGHTLY
Qty: 30 TABLET | Refills: 3 | Status: SHIPPED | OUTPATIENT
Start: 2024-06-10

## 2024-06-10 RX ORDER — ASPIRIN 81 MG/1
81 TABLET, CHEWABLE ORAL DAILY
Qty: 30 TABLET | Refills: 3 | Status: SHIPPED | OUTPATIENT
Start: 2024-06-11

## 2024-06-10 RX ORDER — VANCOMYCIN HYDROCHLORIDE 125 MG/1
250 CAPSULE ORAL 4 TIMES DAILY
Status: DISCONTINUED | OUTPATIENT
Start: 2024-06-10 | End: 2024-06-10 | Stop reason: HOSPADM

## 2024-06-10 RX ORDER — CEFUROXIME AXETIL 250 MG/1
500 TABLET ORAL EVERY 12 HOURS SCHEDULED
Status: DISCONTINUED | OUTPATIENT
Start: 2024-06-10 | End: 2024-06-10 | Stop reason: HOSPADM

## 2024-06-10 RX ORDER — VANCOMYCIN HYDROCHLORIDE 250 MG/1
250 CAPSULE ORAL 4 TIMES DAILY
Qty: 56 CAPSULE | Refills: 0 | Status: SHIPPED | OUTPATIENT
Start: 2024-06-10 | End: 2024-06-24

## 2024-06-10 RX ADMIN — ASPIRIN 81 MG: 81 TABLET, CHEWABLE ORAL at 08:59

## 2024-06-10 RX ADMIN — FERROUS SULFATE TAB 325 MG (65 MG ELEMENTAL FE) 325 MG: 325 (65 FE) TAB at 12:06

## 2024-06-10 RX ADMIN — GABAPENTIN 300 MG: 300 CAPSULE ORAL at 08:59

## 2024-06-10 RX ADMIN — NITROGLYCERIN 0.5 INCH: 20 OINTMENT TOPICAL at 09:10

## 2024-06-10 RX ADMIN — Medication 1 CAPSULE: at 08:59

## 2024-06-10 RX ADMIN — IPRATROPIUM BROMIDE AND ALBUTEROL SULFATE 1 DOSE: .5; 3 SOLUTION RESPIRATORY (INHALATION) at 08:37

## 2024-06-10 RX ADMIN — PIPERACILLIN AND TAZOBACTAM 3375 MG: 3; .375 INJECTION, POWDER, LYOPHILIZED, FOR SOLUTION INTRAVENOUS at 06:25

## 2024-06-10 RX ADMIN — POTASSIUM CHLORIDE 40 MEQ: 1500 TABLET, EXTENDED RELEASE ORAL at 08:59

## 2024-06-10 RX ADMIN — NITROGLYCERIN 0.5 INCH: 20 OINTMENT TOPICAL at 10:29

## 2024-06-10 RX ADMIN — IBUPROFEN 400 MG: 400 TABLET, FILM COATED ORAL at 12:06

## 2024-06-10 RX ADMIN — ENOXAPARIN SODIUM 30 MG: 100 INJECTION SUBCUTANEOUS at 08:59

## 2024-06-10 RX ADMIN — METOPROLOL TARTRATE 12.5 MG: 25 TABLET, FILM COATED ORAL at 10:26

## 2024-06-10 RX ADMIN — FERROUS SULFATE TAB 325 MG (65 MG ELEMENTAL FE) 325 MG: 325 (65 FE) TAB at 08:59

## 2024-06-10 RX ADMIN — IPRATROPIUM BROMIDE AND ALBUTEROL SULFATE 1 DOSE: .5; 3 SOLUTION RESPIRATORY (INHALATION) at 12:51

## 2024-06-10 RX ADMIN — IBUPROFEN 400 MG: 400 TABLET, FILM COATED ORAL at 08:59

## 2024-06-10 RX ADMIN — NITROGLYCERIN 0.5 INCH: 20 OINTMENT TOPICAL at 08:01

## 2024-06-10 ASSESSMENT — ENCOUNTER SYMPTOMS
RHINORRHEA: 0
EYE DISCHARGE: 0
BACK PAIN: 0
SINUS PRESSURE: 0
DIARRHEA: 1
NAUSEA: 0
COUGH: 0
SHORTNESS OF BREATH: 0
SORE THROAT: 0
ABDOMINAL PAIN: 0
EYE REDNESS: 0
WHEEZING: 0
SINUS PAIN: 0
CONSTIPATION: 0

## 2024-06-10 ASSESSMENT — PAIN DESCRIPTION - LOCATION: LOCATION: ARM

## 2024-06-10 ASSESSMENT — PAIN SCALES - GENERAL: PAINLEVEL_OUTOF10: 3

## 2024-06-10 ASSESSMENT — PAIN DESCRIPTION - ORIENTATION: ORIENTATION: LEFT

## 2024-06-10 NOTE — PROGRESS NOTES
Nutrition Note    RECOMMENDATIONS  PO Diet: Continue current diet  ONS: Changed ONS to Ensure Compact BID d/t fluid restriction    NUTRITION ASSESSMENT   Pt triggered for LOS assessment. Pt unavailable upon multiple attempts at visiting, information obtained from chart review. On regular, 1500 mL fluid restricted, no caffeine diet with variable documented meal intakes throughout admission but averaging %. Ensure Plus HP currently ordered TID, unsure if accepting as no intakes documented. Wt fluctuations in chart, hard to accurately assess for recent wt changes at this time. RD will monitor for adequate po intake.     Nutrition Related Findings: LBM 6/9. Trace generalized, BLE edema.  Wounds: Multiple, Surgical Incision (CARL wound etiology x1)  Nutrition Education:  Education not indicated   Nutrition Goals: PO intake 75% or greater, by next RD assessment     MALNUTRITION ASSESSMENT   Malnutrition Status: No malnutrition    NUTRITION DIAGNOSIS   No nutrition diagnosis at this time     CURRENT NUTRITION THERAPIES  ADULT DIET; Regular; 1500 ml; No Caffeine  ADULT ORAL NUTRITION SUPPLEMENT; Breakfast, Lunch, Dinner; Standard High Calorie/High Protein Oral Supplement     PO Intake: %   PO Supplement Intake:Unable to assess    ANTHROPOMETRICS  Current Height: 165.1 cm (5' 5\")  Current Weight - Scale: 107.6 kg (237 lb 3.4 oz)    Admission weight: 102.9 kg (226 lb 13.7 oz)  Ideal Body Weight (IBW): 125 lbs  (57 kg)        BMI: 39.5    COMPARATIVE STANDARDS  Total Energy Requirements (kcals/day): 2276     Protein (g):         Fluid (mL/day):  2276    The patient will be monitored per nutrition standards of care. Consult dietitian if additional nutrition interventions are needed prior to RD reassessment.     Elena Zacarias MS, RD, LD    Contact: [phone]

## 2024-06-10 NOTE — CONSULTS
[facility]   Electrophysiology Consultation   Date: 6/10/2024  Reason for Consultation: Paroxysmal atrial fibrillation  Consult Requesting Physician: Sonya Milan MD     Chief Complaint   Patient presents with    Chest Pain     Chest pain started last night. S/p 2 right lobectomy on 5/22/24.     HPI: Cici Hernández is a 53 y.o. female with history of Right lower lobe lung nodule positive for atypical cells who underwent right lower lobe/right middle lobe lobectomy with mediastinal lymph node dissection on 5/22.  Pathology showed typical carcinoid, neuroendocrine tumor grade 1.  Lymph nodes were positive for carcinoid.  She had an elevated CRP of 250 and ESR of 91.  She was discharged on 5/31 but readmitted 6/1 due to right sided chest pain and difficulty breathing.  She had evidence of right hydropneumothorax, chest tube was placed and removed 2 days ago.  She has been on antibiotics.     She developed tachycardia Saturday evening with one brief episode of AF. She was given Lasix to improve fluid status as potential cause for tachycardia. She had additional episode of AF with RVR Sunday afternoon lasting approximately 90 min and was symptomatic with palpations at this time. She was started on IV amiodarone.  Past Medical History:   Diagnosis Date    Edema     left leg    Lung nodule     right    Smoker         Past Surgical History:   Procedure Laterality Date    CARPAL TUNNEL RELEASE Right     CHOLECYSTECTOMY      with hiatal hernia rep    CT GUIDED CHEST TUBE  6/5/2024    CT GUIDED CHEST TUBE 6/5/2024 [facility] CT SCAN    CT NEEDLE BIOPSY LUNG PERCUTANEOUS  04/16/2024    CT NEEDLE BIOPSY LUNG PERCUTANEOUS 4/16/2024 [facility] CT SCAN    LUNG SURGERY Right 5/22/2024    ROBOTIC ASSISTED RIGHT LOWER LOBE LOBECTOMY WITH MEDIASTINAL LYMPH NODE DISSECTION performed by Sonya Milan MD at [facility] OR    NASAL FRACTURE SURGERY      closed reduction    TUBAL LIGATION         Allergies   Allergen Reactions    Naproxen  small dots of air.     No other significant interval change.     CXR 6/10/2024  FINDINGS:  The heart is enlarged.  Mediastinum is normal.  Left lung is clear.  Dense  airspace opacification diffusely in the right lung, stable from prior  imaging.  No significant skeletal finding.     IMPRESSION:  Persistent dense airspace opacification in the right lung.        Echo:  5/6/2024:    Left Ventricle: Normal left ventricular systolic function with a visually estimated EF of 55 - 60%. Left ventricle size is normal. Normal wall thickness. Normal wall motion. Global longitudinal strain is normal. Diastolic dysfunction present with normal LV EF.    Mitral Valve: Mild regurgitation with a centrally directed jet.    Image quality is good.  Scheduled Meds:   nitroglycerin  0.5 inch Topical Q4H    metoprolol tartrate  12.5 mg Oral BID    potassium chloride  40 mEq Oral TID    ipratropium 0.5 mg-albuterol 2.5 mg  1 Dose Inhalation TID RT    ferrous sulfate  325 mg Oral TID WC    gabapentin  300 mg Oral TID    ibuprofen  400 mg Oral TID WC    lactobacillus  1 capsule Oral BID WC    sodium chloride flush  5-40 mL IntraVENous 2 times per day    atorvastatin  40 mg Oral Nightly    enoxaparin  30 mg SubCUTAneous BID    aspirin  81 mg Oral Daily    piperacillin-tazobactam  3,375 mg IntraVENous q8h     Continuous Infusions:   sodium chloride 10 mL/hr at 06/09/24 0532     PRN Meds:.albuterol, ALPRAZolam, senna, ketorolac, traMADol, morphine, sodium chloride flush, sodium chloride, potassium chloride **OR** potassium alternative oral replacement **OR** potassium chloride, magnesium sulfate, ondansetron **OR** ondansetron, acetaminophen **OR** acetaminophen, polyethylene glycol, oxyCODONE, oxyCODONE     Patient Active Problem List    Diagnosis Date Noted    Hypervolemia 06/09/2024    Tachycardia 06/09/2024    Atrial fibrillation (HCC) 06/09/2024    Anemia 06/09/2024    Sepsis (HCC) 06/03/2024    Obesity, Class II, BMI 35-39.9 06/03/2024     Elevated C-reactive protein (CRP) 06/03/2024    Elevated sed rate 06/03/2024    Status post lobectomy of lung 06/03/2024    Chest pain 06/01/2024    Tobacco abuse counseling 05/30/2024    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 05/29/2024    Benign neuroendocrine tumor of lung 05/29/2024    Diastolic dysfunction 05/29/2024    Ex-heavy cigarette smoker (20-39 per day) 05/29/2024    Class 2 obesity due to excess calories with body mass index (BMI) of 39.0 to 39.9 in adult 05/29/2024    Lung mass 05/22/2024    Lung nodule 05/08/2024      Active Hospital Problems    Diagnosis Date Noted    Hypervolemia [E87.70] 06/09/2024    Tachycardia [R00.0] 06/09/2024    Atrial fibrillation (HCC) [I48.91] 06/09/2024    Anemia [D64.9] 06/09/2024    Sepsis (HCC) [A41.9] 06/03/2024    Obesity, Class II, BMI 35-39.9 [E66.9] 06/03/2024    Elevated C-reactive protein (CRP) [R79.82] 06/03/2024    Elevated sed rate [R70.0] 06/03/2024    Status post lobectomy of lung [Z90.2] 06/03/2024    Chest pain [R07.9] 06/01/2024    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  This generally resolves with time.  Given that she had rapid response with her A-fib and symptomatic, I will continue her on amiodarone 2 suppress the atrial fibrillation.  After 3 months we can stop the amiodarone.  We can then do an event monitor about 6 months from now and if no further A-fib is seen, no further action would be necessary.  - In normal sinus rhythm  - S/p R lower and middle lobectomy and lymph node dissection for lung nodule 5/22  - Her episodes of AF are likely a byproduct of recent thoracic surgery, likely that this will resolve over the next few months  - [id number] score of 1, no indication for anticoagulative therapy  - Start PO amiodarone for next

## 2024-06-10 NOTE — CARE COORDINATION
Per team ID to follow up with pt today and may d/c probably on oral abx.    If pt would need IV abx would need PICC still placed and services set up.     CM called Connie with [employer] [phone] to check IV benefits in case needed.    Liyah Barfield RN, BSN  [phone]

## 2024-06-10 NOTE — PROGRESS NOTES
[facility]   Electrophysiology   Date: 6/10/2024  Reason for Follow up: AF with RVR   Chief Complaint   Patient presents with    Chest Pain     Chest pain started last night. S/p 2 right lobectomy on 5/22/24.       HPI: Cici Hernández is a 53 y.o. female with h/o emphysema,     Right lower lobe lung nodule positive for atypical cells who underwent right lower lobe/right middle lobe lobectomy with mediastinal lymph node dissection on 5/22.  Pathology showed typical carcinoid, neuroendocrine tumor grade 1.  Lymph nodes were positive for carcinoid.  She had an elevated CRP of 250 and ESR of 91.  She was discharged on 5/31 but readmitted 6/1 due to right sided chest pain and difficulty breathing.  She had evidence of right hydropneumothorax, chest tube was placed and removed yesterday.  She has been on antibiotics.     She developed tachycardia Saturday evening with one brief episode of AF. She was given Lasix to improve fluid status as potential cause for tachycardia. She had additional episode of AF with RVR Sunday afternoon lasting approximately 90 min and was symptomatic with palpations at this time. She was started on IV amiodarone.    Pt seen at bedside with daughter present. She denies CP, SOB, palpitations, lightheadedness. Reports she could feel when she had irregular rhythm yesterday and hasn't felt since. Discussed risks of AF and monitoring during post-op window.       Weight: 237 lbs this AM  24 hours I/O: +1257.7    Assessment/Plan:     Tachycardia/AF  - In normal sinus rhythm  - S/p R lower and middle lobectomy and lymph node dissection for lung nodule 5/22  - Her episodes of AF are likely a byproduct of recent thoracic surgery, likely that this will resolve over the next few months  - [id number] score of 1, no indication for anticoagulative therapy  - Start PO amiodarone for next 3 months (200 mg BID for 2 weeks then 200 mg daily) and follow up to assess for additional

## 2024-06-10 NOTE — PROGRESS NOTES
Infectious Diseases   Progress Note      Admission Date: 6/1/2024  Hospital Day: Hospital Day: 10   Attending: No att. providers found  Date of service: 6/10/2024     Chief complaint/ Reason for consult:     Sepsis on admission with leukocytosis, tachycardia, tachypnea, hypotension  Elevated CRP  Elevated sed rate  S/p robotic assisted right lower lobe and middle lobe lobectomy with mediastinal lymph node dissection surgery on 5/20/2024-surgical pathology was positive for neuroendocrine tumor grade 1 with +4 R lymph node for carcinoid  Histoplasma exposure-positive station 7 pulmonary lymph node cytopathology for histoplasma capsulatum    Microbiology:        I have reviewed allavailable micro lab data and cultures    Blood culture (2/2) - collected on 6/1/2024: Negative  Chest wall wound culture  - collected on 6/1/2024: In process    Culture, Wound  Order: [id number]  Status: Preliminary result       Visible to patient: No (not released)       Next appt: 06/13/2024 at 09:00 AM in Cardiothoracic Surgery (Junior Esquivel PA-C)    Specimen Information: Chest   0 Result Notes       Component  Resulting Agency   WOUND/ABSCESS No growth to date P [facility]   Gram Stain Result 1+ Epithelial Cells present  No WBC's seen  rare gram positive cocci present [facility]              Narrative  Performed by: [facility]  ORDER#: [id number]                          ORDERED BY: KENNY BOO  SOURCE: Chest                              COLLECTED:  06/03/24 16:15  ANTIBIOTICS AT NISREEN.:                      RECEIVED :  06/03/24 17:38      Specimen Collected: 06/03/24 16:15 EDT Last Resulted: 06/04/24 12:57 EDT               Antibiotics and immunizations:       Current antibiotics: All antibiotics and their doses were reviewed by me    Recent Abx Admin                     piperacillin-tazobactam (ZOSYN) 3,375 mg in sodium chloride 0.9 % 50 mL IVPB (mini-bag) (mg) 3,375 mg New Bag 06/10/24    CP angle with small dots of air.      No other significant interval change.         XR CHEST PORTABLE   Final Result   No significant interval change in right pleural effusion and right-sided   consolidation as compared to prior.         XR CHEST PORTABLE   Final Result   Interval placement of right-sided pigtail chest tube.  No evidence of a   pneumothorax.  Small effusion.         CT GUIDED CHEST TUBE   Final Result   Successful CT guided placement of a 10 Jordanian right chest tube.         CT CHEST WO CONTRAST   Final Result   1. Right hydropneumothorax is slightly larger.  Multiple loculated bubbles of   gas are present inferiorly..   2. Consolidation in the right lower lung zone and right middle lobe are   unchanged.   3. Pneumoperitoneum and subcutaneous emphysema are still present.         XR CHEST (2 VW)   Final Result   Increasing small right apical pneumothorax with unchanged right effusion.         CT CHEST PULMONARY EMBOLISM W CONTRAST   Final Result   1. No saddle pulmonary embolus.  Otherwise limited exam due to suboptimal   bolus and motion artifact.   2. Postsurgical changes from right middle and lower lobectomy with a small to   moderate right hydropneumothorax.  More focal foci of gas in the right lung   base may reflect superimposed infection in the appropriate clinical setting.   3. Subcutaneous emphysema in the anterior chest wall as well as a trace   amount of pneumoperitoneum anterior and posterior to the right liver.  These   could be postsurgical in etiology.   4. A few patchy ground-glass opacities in the right lung, nonspecific and may   be infectious or inflammatory in etiology.      RECOMMENDATIONS:   Multiple pulmonary nodules. Most significant: Right ground-glass pulmonary   nodule within the upper lobe measuring 12 mm. Per Fleischner Society   Guidelines, recommend a non-contrast Chest CT at 3-6 months. Subsequent   management based on the most suspicious nodule(s).      These

## 2024-06-10 NOTE — PROGRESS NOTES
[facility] Cardiothoracic Progress Note:    Surgery: S/P robotic assisted right lower lobe with mediastinal lymph node dissection on 5/22/24 with Dr. Milan. Path demonstrated carcinoid tumor and 1 LN was positive.   Surgeon: Dr. Milan  POD #: 16     Re-Admitted on to the ED on 6/2 with uncontrolled chest pain     Procedure: IR guided right chest tube placement   Physician: Dr. spaulding  POD#: 4    Subjective:   6/5: Patient extremely anxious (wanting to go home) sitting up in bed on RA. Alert and oriented x 4      6/6: Patient seen and examined this morning; states she hasn't been able to have BM yet; some discomfort from CT placement yesterday.      6/7: patient seen and examined this morning; siting in chair, pain much improved; repeat CXR with no significant change from previous study; plan repeat CT Chest     6/8: patient up sitting in chair, repeat CT Chest demonstrates persistent but slightly smaller loculated fluid collection.     6/9: patient sitting up in chair, anxious, HR's in the 100's overnight; K 3.2 this morning; states she did not take her medication although it was given    6/10 - patient resting in bed comfortably. Went into a-fib with RVR yesterday.  Currently in NSR.    Vital Signs: /74   Pulse 86   Temp 99.5 °F (37.5 °C) (Temporal)   Resp 16   Ht 1.651 m (5' 5\")   Wt 107.6 kg (237 lb 3.4 oz)   SpO2 95%   BMI 39.47 kg/m²  O2 Flow Rate (L/min): 2 L/min     LABORATORY DATA:    CBC:   Recent Labs     06/09/24  0405   WBC 10.9   HGB 9.2*   HCT 26.0*   MCV 86.7   *     BMP:   Recent Labs     06/07/24  0950 06/09/24  0405 06/09/24  1603 06/10/24  0612    137  --   --    K 3.9 3.2* 3.9 3.7    101  --   --    CO2 22 22  --   --    BUN 16 15  --   --    CREATININE 0.6 0.6  --   --      MG:    Recent Labs     06/09/24  0405   MG 2.00      Cardiac Enzymes: No results for input(s): \"CKTOTAL\", \"CKMB\", \"CKMBINDEX\", \"TROPONINI\" in the last 72

## 2024-06-10 NOTE — PROGRESS NOTES
CLINICAL PHARMACY NOTE: MEDS TO BEDS    Total # of Prescriptions Filled: 5   The following medications were delivered to the patient:  Acidophilus  Amiodarone 200mg  Metronidazole 500mg  Vancomycin 250mg  Cefuroxime 500mg    Additional Documentation:     Medications were picked up in the Outpatient Pharmacy by patient     Bonita Mendiola CPhT

## 2024-06-11 ENCOUNTER — HOSPITAL ENCOUNTER (OUTPATIENT)
Age: 54
Discharge: HOME | End: 2024-06-11
Payer: COMMERCIAL

## 2024-06-11 DIAGNOSIS — E87.6 HYPOKALEMIA: ICD-10-CM

## 2024-06-11 LAB
ANION GAP SERPL CALCULATED.3IONS-SCNC: 11 MMOL/L (ref 3–16)
BUN SERPL-MCNC: 21 MG/DL (ref 7–20)
CALCIUM SERPL-MCNC: 9 MG/DL (ref 8.3–10.6)
CHLORIDE SERPL-SCNC: 100 MMOL/L (ref 99–110)
CO2 SERPL-SCNC: 22 MMOL/L (ref 21–32)
CREAT SERPL-MCNC: 0.8 MG/DL (ref 0.6–1.1)
EKG ATRIAL RATE: 60 BPM
EKG DIAGNOSIS: NORMAL
EKG P AXIS: 15 DEGREES
EKG P-R INTERVAL: 122 MS
EKG Q-T INTERVAL: 416 MS
EKG QRS DURATION: 92 MS
EKG QTC CALCULATION (BAZETT): 416 MS
EKG R AXIS: 1 DEGREES
EKG T AXIS: 14 DEGREES
EKG VENTRICULAR RATE: 60 BPM
GFR SERPLBLD CREATININE-BSD FMLA CKD-EPI: 86 ML/MIN/{1.73_M2}
GLUCOSE SERPL-MCNC: 128 MG/DL (ref 70–99)
POTASSIUM SERPL-SCNC: 4.3 MMOL/L (ref 3.5–5.1)
SODIUM SERPL-SCNC: 133 MMOL/L (ref 136–145)

## 2024-06-11 PROCEDURE — 80048 BASIC METABOLIC PNL TOTAL CA: CPT

## 2024-06-11 PROCEDURE — 36415 COLL VENOUS BLD VENIPUNCTURE: CPT

## 2024-06-11 PROCEDURE — 93010 ELECTROCARDIOGRAM REPORT: CPT | Performed by: INTERNAL MEDICINE

## 2024-06-12 NOTE — PROGRESS NOTES
Physician Progress Note      PATIENT:               HARLEY GOODE  CSN #:                  [id number]  :                       1970  ADMIT DATE:       2024 6:31 PM  DISCH DATE:        6/10/2024 4:25 PM  RESPONDING  PROVIDER #:        Sonya Baker MD          QUERY TEXT:    Pt admitted with right chest pain and underwent recent right middle and lower   lobe lobectomy with mediastinal lymph node dissection.? 6/10 Electrophysiology   notes- Hydropneumothorax/s/p carcinoid tumor resection. If possible, please   document in progress notes and discharge summary the relationship if any   between the chest pain and the surgery:    The medical record reflects the following:  Risk Factors: Status post right middle and lower lobe lobectomy with   mediastinal lymph node dissection    Clinical Indicators:  H&P - Status post right middle and lower lobe lobectomy   with mediastinal lymph node dissection, pneumonia, sepsis  Pain right chest on inspiration and after removal of chest tube  Labs show elevated CRP, WBC of 20.4  Chest imaging shows small to moderate right hydropneumothorax, subcutaneous   emphysema,    , WBC 20.4, RR 22    Treatment: Continue Ibuprofen, IV Zosyn, pulmonology, Cardiothoracic Surgery   and ID consults, monitor labs. monitor vitals    Thank You Delma Peralta RN, CDS [email or web address]  Options provided:  -- Chest pain related to right hydropneumothorax is due to the procedure   24  -- Chest pain related to pneumonia with sepsis is due to the procedure 24  -- Chest pain related to, please specify, is due to the procedure 24,   please specify, due to the procedure 24.  -- Other - I will add my own diagnosis  -- Disagree - Not applicable / Not valid  -- Disagree - Clinically unable to determine / Unknown  -- Refer to Clinical Documentation Reviewer    PROVIDER RESPONSE TEXT:    Patient has chest pain related to post thoracoscopy chest pain    Query created by: Fabian  Delma on 6/10/2024 3:11 PM      Electronically signed by:  Sonya Baker MD 6/12/2024 2:53 PM

## 2024-06-13 NOTE — DISCHARGE SUMMARY
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 *      Consults:  IP CONSULT TO CARDIOTHORACIC SURGERY  IP CONSULT TO INFECTIOUS DISEASES  IP CONSULT TO NEPHROLOGY  IP CONSULT TO PULMONOLOGY  IP CONSULT TO CARDIOLOGY    PET CT: 3/25/24  FINDINGS:  HEAD/NECK: Mucosal thickening of the left maxillary sinus.  Mild activity is  seen within a nonenlarged right level II lymph node, typically reactive.     CHEST: Within the mediastinum, no erik activity markedly greater than  mediastinal background.  No hypermetabolic axillary adenopathy.  Muscular  activity about the shoulders, right greater than left, likely inflammatory.     Mild ground-glass opacity is seen within the right lower lobe posteriorly  with diffuse associated activity, SUV maximum 2.3.  1.3 cm lateral right  lower lobe pulmonary nodule is again demonstrated, with characterization on  PET limited secondary to its close proximity to diaphragm and liver, as well  as additional ground-glass opacity at the right base.  The region has SUV  maximum approximately 2.4.  This is similar in degree to the level of  activity is seen at the ground-glass opacity.  Additional patchy ground-glass  and linear opacity is seen bilaterally.  For example posterior left lower  lobe ground-glass opacity has SUV maximum 2.  Azygous fissure.  Calcified  granuloma within the left lower lobe.  No  pneumothorax or pleural effusion.     ABDOMEN/PELVIS: Excretion of activity is seen from bilateral kidneys and  activity is seen within ureters and urinary bladder.  Bowel activity is seen  which can be physiologically variable.     Status post cholecystectomy.  8.9 cm right upper pole renal cyst.  Calcification of the abdominal aorta and iliac arteries.  Diverticulosis.     BONES/SOFT TISSUE: No marked osseous activity is observed.     IMPRESSION:  Patchy ground-glass opacity is seen bilaterally with diffuse associated  activity; correlate for inflammatory or infectious pneumonitis.  Characterization of the 1.3 cm right lower lobe pulmonary nodule is limited  secondary to this background activity as well as the nodule's proximity to  the liver and diaphragm.  The nodule remains indeterminate.  If biopsy is not  clinically indicated at this time, CT follow-up in 3-6 months time would be  suggested.     No findings of FDG avid metastatic disease in the abdomen or pelvis.    Procedure:  S/P robotic assisted right lower lobe with mediastinal lymph node dissection on 5/22/24 with Dr. Milan. Path demonstrated carcinoid tumor and 1 LN was positive     History:  The patient is a 53 y.o. female with PMHx of active tobacco use, with a 1.2 x 1 cm to 1.5 x 1.2 cm carcinoid tumor s/p robotic assisted right lower lobe with mediastinal lymph node dissection on 5/22/24 with Dr. Milan. After an expected post-op recovery course, she was discharge to home Friday 5/31/2024. She presented to ED last night with uncontrolled chest pain. Lab workup revealed WBC 20 and elevated CRP, but no elev troponin, BNP. UA without findings of UTI. CT PE protocol demonstrated an expected amount of air and pleural fluid in the R chest, with some subq emphysema (recent CT pull). She was admitted for NSAID treatment for suspected pleuritis/poss pericarditis, and started on broad spectrum Abx. Continues to be afebrile, and WBC count decreasing (16 today)      Hospital Course:  The patient underwent IR guided right pleural chest tube placement on 6/5/24. Patient had an episode of a-fib with RVR on 6/9 and converted to NSR after being started on amiodarone.  Chest tube was removed on 5/8/24. She was discharged home on antibiotics per ID and amiodarone per EP.  Pain was controlled with combination of oral and IV medications.  Patient was able to ambulate without difficulty and tolerate a regular diet. The patient was discharged on 6/10/2024.       Disposition:  home    Condition:  good      Discharge Medications:  Discharge Medication List as of 6/10/2024  3:53 PM             Details   amiodarone (CORDARONE) 200 MG tablet Take 1 tablet by mouth 2 times daily for 14 days, THEN 1 tablet daily., Disp-104 tablet, R-0Normal      cefUROXime (CEFTIN) 500 MG tablet Take 1 tablet by mouth every 12 hours for 7 days, Disp-14 tablet, R-0Normal      metroNIDAZOLE (FLAGYL) 500 MG tablet Take 1 tablet by mouth every 8 hours for 7 days, Disp-21 tablet, R-0Normal      vancomycin (VANCOCIN) 250 MG capsule Take 1 capsule by mouth 4 times daily for 14 days, Disp-56 capsule, R-0Normal      lactobacillus (CULTURELLE) capsule Take 1 capsule by mouth in the morning and at bedtime for 15 days, Disp-30 capsule, R-0Normal      aspirin 81 MG chewable tablet Take 1 tablet by mouth daily, Disp-30 tablet, R-3Normal      ibuprofen (ADVIL;MOTRIN) 400 MG tablet Take 1 tablet by mouth 3 times daily (with meals), Disp-90 tablet, R-0Normal      gabapentin (NEURONTIN) 300 MG capsule Take 1 capsule by mouth 3 times daily for 30 days., Disp-90 capsule, R-0Normal      atorvastatin (LIPITOR) 40 MG tablet Take 1 tablet by mouth nightly, Disp-30 tablet, R-3Normal      metoprolol tartrate (LOPRESSOR) 25 MG tablet Take 0.5 tablets by mouth 2 times daily, Disp-60 tablet, R-3Normal      ferrous sulfate (IRON 325) 325 (65 Fe) MG tablet Take 1 tablet by mouth 2 times daily (with meals), Disp-60 tablet, R-0Normal

## 2024-06-14 ENCOUNTER — TELEPHONE (OUTPATIENT)
Age: 54
End: 2024-06-14

## 2024-06-14 NOTE — TELEPHONE ENCOUNTER
S/P ROBOTIC ASSISTED RIGHT LOWER LOBE LOBECTOMY WITH MEDIASTINAL LYMPH NODE DISSECTION 5/22/24 Dr WEST  Patient called has a post op appointment with Junior Cervantes 6/20/24    C/o gabapentin making her feel loopy,and tired.Patient is currently on gabapentin 300 mg tid. Spoke with Danis JEROME ok to decrease gabapentin 100 mg tid or even discontinue.Patient was informed and verbalized understanding.    She states that she has diarrhea and she will call [facility] office since this started after he put her on Ceftin,Flagyl,and vancomycin.Provided  phone number to call today.

## 2024-06-15 ENCOUNTER — TELEPHONE (OUTPATIENT)
Dept: INFECTIOUS DISEASES | Age: 54
End: 2024-06-15

## 2024-06-15 DIAGNOSIS — R11.2 NAUSEA AND VOMITING, UNSPECIFIED VOMITING TYPE: Primary | ICD-10-CM

## 2024-06-15 RX ORDER — ONDANSETRON 4 MG/1
4 TABLET, FILM COATED ORAL 3 TIMES DAILY PRN
Qty: 15 TABLET | Refills: 1 | Status: SHIPPED | OUTPATIENT
Start: 2024-06-15

## 2024-06-15 NOTE — TELEPHONE ENCOUNTER
Infectious disease on-call note    Received a call from patient, she states she was trying to call Dr. Griggs's office yesterday but received no reply.  She states she has been dealing with nausea and thinks it may be secondary to antibiotics.  States that she is still having diarrhea,\" has a bowel movement every time she goes\" but states slight improvement.  Denies any fevers or chills.    Prescription for oral Zofran 4 mg 3 times daily as needed nausea/vomiting sent to [facility] in [place] as per patient's request.    Questions and concerns addressed with patient over the phone.  She denied any further complaints.    Orders Placed This Encounter    ondansetron (ZOFRAN) 4 MG tablet     Sig: Take 1 tablet by mouth 3 times daily as needed for Nausea or Vomiting     Dispense:  15 tablet     Refill:  1        Lisa Garcia MD

## 2024-06-17 LAB — FUNGUS BLD CULT: NORMAL

## 2024-06-18 ENCOUNTER — TELEPHONE (OUTPATIENT)
Dept: INFECTIOUS DISEASES | Age: 54
End: 2024-06-18

## 2024-06-18 ENCOUNTER — HOSPITAL ENCOUNTER (EMERGENCY)
Age: 54
Discharge: HOME | End: 2024-06-18
Attending: EMERGENCY MEDICINE
Payer: COMMERCIAL

## 2024-06-18 VITALS
HEIGHT: 65 IN | HEART RATE: 67 BPM | OXYGEN SATURATION: 97 % | DIASTOLIC BLOOD PRESSURE: 83 MMHG | RESPIRATION RATE: 16 BRPM | BODY MASS INDEX: 38.32 KG/M2 | TEMPERATURE: 98.3 F | WEIGHT: 230 LBS | SYSTOLIC BLOOD PRESSURE: 142 MMHG

## 2024-06-18 DIAGNOSIS — R19.7 DIARRHEA, UNSPECIFIED TYPE: Primary | ICD-10-CM

## 2024-06-18 LAB
ALBUMIN SERPL-MCNC: 3.2 G/DL (ref 3.4–5)
ALBUMIN/GLOB SERPL: 0.8 {RATIO} (ref 1.1–2.2)
ALP SERPL-CCNC: 43 U/L (ref 40–129)
ALT SERPL-CCNC: 16 U/L (ref 10–40)
ANION GAP SERPL CALCULATED.3IONS-SCNC: 11 MMOL/L (ref 3–16)
AST SERPL-CCNC: 23 U/L (ref 15–37)
BACTERIA URNS QL MICRO: ABNORMAL /HPF
BASOPHILS # BLD: 0 K/UL (ref 0–0.2)
BASOPHILS NFR BLD: 0.3 %
BILIRUB SERPL-MCNC: 0.3 MG/DL (ref 0–1)
BILIRUB UR QL STRIP.AUTO: NEGATIVE
BUN SERPL-MCNC: 13 MG/DL (ref 7–20)
CALCIUM SERPL-MCNC: 8.5 MG/DL (ref 8.3–10.6)
CHLORIDE SERPL-SCNC: 101 MMOL/L (ref 99–110)
CLARITY UR: ABNORMAL
CO2 SERPL-SCNC: 24 MMOL/L (ref 21–32)
COLOR UR: YELLOW
CREAT SERPL-MCNC: 0.7 MG/DL (ref 0.6–1.1)
DEPRECATED RDW RBC AUTO: 14.9 % (ref 12.4–15.4)
EOSINOPHIL # BLD: 0.4 K/UL (ref 0–0.6)
EOSINOPHIL NFR BLD: 5 %
EPI CELLS #/AREA URNS AUTO: 11 /HPF (ref 0–5)
GFR SERPLBLD CREATININE-BSD FMLA CKD-EPI: >90 ML/MIN/{1.73_M2}
GLUCOSE SERPL-MCNC: 102 MG/DL (ref 70–99)
GLUCOSE UR STRIP.AUTO-MCNC: NEGATIVE MG/DL
HCT VFR BLD AUTO: 30.6 % (ref 36–48)
HGB BLD-MCNC: 10.6 G/DL (ref 12–16)
HGB UR QL STRIP.AUTO: NEGATIVE
HYALINE CASTS #/AREA URNS AUTO: 0 /LPF (ref 0–8)
KETONES UR STRIP.AUTO-MCNC: NEGATIVE MG/DL
LEUKOCYTE ESTERASE UR QL STRIP.AUTO: ABNORMAL
LIPASE SERPL-CCNC: 28 U/L (ref 13–60)
LYMPHOCYTES # BLD: 1.2 K/UL (ref 1–5.1)
LYMPHOCYTES NFR BLD: 13.7 %
MCH RBC QN AUTO: 29.8 PG (ref 26–34)
MCHC RBC AUTO-ENTMCNC: 34.5 G/DL (ref 31–36)
MCV RBC AUTO: 86.2 FL (ref 80–100)
MONOCYTES # BLD: 0.9 K/UL (ref 0–1.3)
MONOCYTES NFR BLD: 10 %
NEUTROPHILS # BLD: 6.3 K/UL (ref 1.7–7.7)
NEUTROPHILS NFR BLD: 71 %
NITRITE UR QL STRIP.AUTO: NEGATIVE
PH UR STRIP.AUTO: 6 [PH] (ref 5–8)
PLATELET # BLD AUTO: 620 K/UL (ref 135–450)
PMV BLD AUTO: 6.6 FL (ref 5–10.5)
POTASSIUM SERPL-SCNC: 4 MMOL/L (ref 3.5–5.1)
PROT SERPL-MCNC: 7 G/DL (ref 6.4–8.2)
PROT UR STRIP.AUTO-MCNC: NEGATIVE MG/DL
RBC # BLD AUTO: 3.55 M/UL (ref 4–5.2)
RBC CLUMPS #/AREA URNS AUTO: 4 /HPF (ref 0–4)
SODIUM SERPL-SCNC: 136 MMOL/L (ref 136–145)
SP GR UR STRIP.AUTO: 1.02 (ref 1–1.03)
UA COMPLETE W REFLEX CULTURE PNL UR: YES
UA DIPSTICK W REFLEX MICRO PNL UR: YES
URN SPEC COLLECT METH UR: ABNORMAL
UROBILINOGEN UR STRIP-ACNC: 1 E.U./DL
WBC # BLD AUTO: 8.9 K/UL (ref 4–11)
WBC #/AREA URNS AUTO: 14 /HPF (ref 0–5)

## 2024-06-18 PROCEDURE — 87086 URINE CULTURE/COLONY COUNT: CPT

## 2024-06-18 PROCEDURE — 80053 COMPREHEN METABOLIC PANEL: CPT

## 2024-06-18 PROCEDURE — 81001 URINALYSIS AUTO W/SCOPE: CPT

## 2024-06-18 PROCEDURE — 83690 ASSAY OF LIPASE: CPT

## 2024-06-18 PROCEDURE — 99283 EMERGENCY DEPT VISIT LOW MDM: CPT

## 2024-06-18 PROCEDURE — 85025 COMPLETE CBC W/AUTO DIFF WBC: CPT

## 2024-06-18 RX ORDER — 0.9 % SODIUM CHLORIDE 0.9 %
1000 INTRAVENOUS SOLUTION INTRAVENOUS ONCE
Status: DISCONTINUED | OUTPATIENT
Start: 2024-06-18 | End: 2024-06-18

## 2024-06-18 ASSESSMENT — PAIN - FUNCTIONAL ASSESSMENT: PAIN_FUNCTIONAL_ASSESSMENT: NONE - DENIES PAIN

## 2024-06-18 ASSESSMENT — ENCOUNTER SYMPTOMS: DIARRHEA: 1

## 2024-06-18 NOTE — ED PROVIDER NOTES
disease      CBC-CBC was ordered to rule out anemia, infection, abnormal platelet count, polycythemia, abnormal Red cell pathology, or any other pathology that might be causing the patient's symptoms   CMP-CMP was ordered to rule out electrolyte abnormalities, liver dysfunction, kidney dysfunction, electrolyte imbalance, abnormal transaminases, or any other pathology that might be causing the patient's symptoms.  Lipase-lipase was ordered to rule out pancreatitis, pancreatic inflammation  Urine-urinalysis was ordered to rule out infection, renal failure, dehydration, proteinuria, bilirubinuria, or any other pathology that might be causing the patient's symptoms         Patient was given the following medications:  Medications - No data to display      Disposition Considerations (tests considered but not done, Shared Decision Making, Pt Expectation of Test or Tx.): Shared decision making used for discharge.  Considered rest of differential diagnosis as above.      I estimate there is LOW risk for ACUTE APPENDICITIS, BOWEL OBSTRUCTION, CHOLECYSTITIS, DIVERTICULITIS, INCARCERATED HERNIA, PANCREATITIS, PERITONITIS, PELVIC INFLAMMATORY DISEASE, OVARIAN TORSION, PERFORATED BOWEL, BOWEL ISCHEMIA, CARDIAC ISCHEMIA, ECTOPIC PREGNANCY, TUBO-OVARIAN ABSCESS, or SEPSIS, thus I consider the discharge disposition reasonable.    Appropriate for outpatient management      The patient is at low risk for mortality based on demographic, history and clinical factors. Given the best available information and clinical assessment, I estimate the risk of hospitalization to be greater than risk of treatment at home. I have explained to the patient that the risk could rapidly change, given precautions for return and instructions. Explained to patient that the risk for mortality is low based on demographic, history and clinical factors.      I discussed with patient the results of evaluation in the ED, diagnosis, care, and prognosis.  The

## 2024-06-18 NOTE — TELEPHONE ENCOUNTER
Spoke with patient to make appointment this morning and she states that she is having horrible diarrhea, states that she has it so bad she can't leave her house, she is worried that she might have C.Diff.    States that its to the point where she coughs or sneezes diarrhea comes out.

## 2024-06-18 NOTE — TELEPHONE ENCOUNTER
I had ordered Ceftin for only a week and she should have completed that.  She should be on oral Flagyl and oral vancomycin.  If the patient is having the severe diarrhea despite those, I would recommend that she should just come to the ER.  She may need hospitalization and inpatient management

## 2024-06-19 ENCOUNTER — OFFICE VISIT (OUTPATIENT)
Dept: PULMONOLOGY | Age: 54
End: 2024-06-19
Payer: COMMERCIAL

## 2024-06-19 VITALS
HEART RATE: 76 BPM | HEIGHT: 65 IN | WEIGHT: 238.2 LBS | BODY MASS INDEX: 39.69 KG/M2 | OXYGEN SATURATION: 97 % | SYSTOLIC BLOOD PRESSURE: 118 MMHG | DIASTOLIC BLOOD PRESSURE: 64 MMHG

## 2024-06-19 DIAGNOSIS — K21.9 GASTROESOPHAGEAL REFLUX DISEASE, UNSPECIFIED WHETHER ESOPHAGITIS PRESENT: ICD-10-CM

## 2024-06-19 DIAGNOSIS — D3A.090 BENIGN CARCINOID TUMOR OF LUNG: ICD-10-CM

## 2024-06-19 DIAGNOSIS — Z87.891 FORMER SMOKER: ICD-10-CM

## 2024-06-19 DIAGNOSIS — J43.2 CENTRILOBULAR EMPHYSEMA (HCC): ICD-10-CM

## 2024-06-19 DIAGNOSIS — R60.0 BILATERAL LEG EDEMA: Primary | ICD-10-CM

## 2024-06-19 DIAGNOSIS — G47.00 INSOMNIA, UNSPECIFIED TYPE: ICD-10-CM

## 2024-06-19 LAB
BACTERIA UR CULT: ABNORMAL
ORGANISM: ABNORMAL

## 2024-06-19 PROCEDURE — 99214 OFFICE O/P EST MOD 30 MIN: CPT | Performed by: INTERNAL MEDICINE

## 2024-06-19 RX ORDER — FUROSEMIDE 40 MG/1
40 TABLET ORAL DAILY
Qty: 60 TABLET | Refills: 3 | Status: SHIPPED | OUTPATIENT
Start: 2024-06-19

## 2024-06-19 RX ORDER — PANTOPRAZOLE SODIUM 40 MG/1
40 TABLET, DELAYED RELEASE ORAL
Qty: 30 TABLET | Refills: 0 | Status: SHIPPED | OUTPATIENT
Start: 2024-06-19

## 2024-06-19 RX ORDER — TRAZODONE HYDROCHLORIDE 100 MG/1
100 TABLET ORAL NIGHTLY
Qty: 90 TABLET | Refills: 0 | Status: SHIPPED | OUTPATIENT
Start: 2024-06-19

## 2024-06-19 RX ORDER — FAMOTIDINE 20 MG/1
20 TABLET, FILM COATED ORAL NIGHTLY
Qty: 30 TABLET | Refills: 0 | Status: SHIPPED | OUTPATIENT
Start: 2024-06-19

## 2024-06-19 NOTE — PROGRESS NOTES
PULMONARY OFFICE FOLLOW UP NOTE    REASON FOR VISIT:   Chief Complaint   Patient presents with    Cancer    COPD     Some SOB but she expects it with everything going on.        DATE OF VISIT: 6/19/2024    HISTORY OF PRESENT ILLNESS: 54 y.o. year old female is here for follow-up.    Patient had CT abdomen performed in December 2023 that showed right lower lobe 1.2 x 1 cm lung nodule.  This was followed by on CT chest in 3 months on 3/11/2024 that showed the right lower lobe nodule has slightly increased in size now measuring 1.5 x 1.2 cm.   Patient had PET/CT performed on 3/25/2024 that showed that the nodule was mildly PET positive.  No PET positive mediastinal lymphadenopathy was noted.      She underwent CT-guided biopsy on 4/16/2024 of right lower lobe lung nodule which was positive for neoplasm.  Very scant atypical cells with crush artifact were noted on the biopsies.  Immunostains were attempted, however, difficult to interpret.  Given the morphology, neuroendocrine neoplasm is in consideration.    Patient underwent right lower lobe/right middle lobe lobectomy with mediastinal lymph node dissection on 5/22.  Pathology findings suggestive of typical carcinoid, neuroendocrine tumor grade 1.  4R lymph node positive for typical carcinoid.  Station 7 lymph node positive for carcinoid.  Lymph nodes were also suggestive of granulomatous inflammation and station 7 lymph node positive for fungal forms, histoplasma capsulatum.     She was discharged and again got within 1 day with findings suggestive of infection. CT chest did not show any evidence of PE. Small to moderate right hydropneumothorax with small subcu emphysema on anterior chest wall. Foci of gas was seen in right lower lobe with right lower lobe infiltrates.  Patient was treated with IV Zosyn.  Also underwent pigtail catheter placement in right hydropneumothorax on 6/6/2024 with resolution of pneumothorax on the follow-up imaging with persistent

## 2024-06-20 ENCOUNTER — HOSPITAL ENCOUNTER (OUTPATIENT)
Dept: GENERAL RADIOLOGY | Age: 54
Discharge: HOME | End: 2024-06-20
Payer: COMMERCIAL

## 2024-06-20 ENCOUNTER — HOSPITAL ENCOUNTER (OUTPATIENT)
Age: 54
Discharge: HOME | End: 2024-06-20
Payer: COMMERCIAL

## 2024-06-20 ENCOUNTER — OFFICE VISIT (OUTPATIENT)
Age: 54
End: 2024-06-20

## 2024-06-20 VITALS
TEMPERATURE: 93.3 F | HEIGHT: 65 IN | BODY MASS INDEX: 39.15 KG/M2 | DIASTOLIC BLOOD PRESSURE: 70 MMHG | WEIGHT: 235 LBS | SYSTOLIC BLOOD PRESSURE: 110 MMHG | OXYGEN SATURATION: 95 % | HEART RATE: 70 BPM

## 2024-06-20 DIAGNOSIS — Z90.2 S/P LOBECTOMY OF LUNG: Primary | ICD-10-CM

## 2024-06-20 DIAGNOSIS — Z09 S/P LUNG SURGERY, FOLLOW-UP EXAM: Primary | ICD-10-CM

## 2024-06-20 DIAGNOSIS — Z90.2 S/P LOBECTOMY OF LUNG: ICD-10-CM

## 2024-06-20 PROCEDURE — 99024 POSTOP FOLLOW-UP VISIT: CPT

## 2024-06-20 PROCEDURE — 71046 X-RAY EXAM CHEST 2 VIEWS: CPT

## 2024-06-20 NOTE — PROGRESS NOTES
[facility]   Post-op Clinic Note     6/20/2024 10:03 AM    Surgeon:  Dr. Milan       Chief complaint : S/P ROBOTIC ASSISTED RIGHT Bi- LOBECTOMY (LOWER AND MIDDLE) WITH MEDIASTINAL LYMPH NODE DISSECTION       Subjective:  Ms. Hernández presents to the office for their first post-op visit.  Her post-op course was complicated by readmission due to pleuritic pain.  Small moderate right hydropneumothorax with small subcu emphysema on anterior chest wall was seen on CT.  Patient was treated with IV Zosyn.  Requiring IR guided right pleural chest tube placement on 6/5/24. Patient had an episode of a-fib with RVR on 6/9 and converted to NSR after being started on amiodarone.  Chest tube was removed on 5/8/24. She was discharged home on antibiotics per ID and amiodarone per EP.  Pain was controlled with combination of oral and IV medications.  Patient was able to ambulate without difficulty and tolerate a regular diet. The patient was discharged on 6/10/2024.       In the office today she reports that she has been doing well since being discharged from the hospital.  She did report diarrhea which has been improving.  She still has a cough that is worse before going to bed.  She has been using her I-S and Acapella every hour during the day.  She was seen by Dr. Salazar yesterday 6/19/2024, who added Lasix 40 mg daily (lower extremity edema), Protonix, Pepcid (GERD), trazodone (insomnia) and scheduled follow-up BNP, BMP.  Patient has follow-up appointment with Dr. Grgigs and a CT scan scheduled prior.  Denies any exertional chest pain, shortness of breath, palpitations, swelling, drainage from their incisions, fevers, or other consitutional symptoms.       Vital Signs: /70 (Site: Right Upper Arm, Position: Sitting, Cuff Size: Large Adult)   Pulse 70   Temp (!) 93.3 °F (34.1 °C) (Temporal)   Ht 1.651 m (5' 5\")   Wt 106.6 kg (235 lb)   SpO2 95%   BMI 39.11 kg/m²        Physical Exam:

## 2024-06-20 NOTE — PROGRESS NOTES
Physician Progress Note      PATIENT:               HARLEY GOODE  CSN #:                  [id number]  :                       1970  ADMIT DATE:       2024 6:31 PM  DISCH DATE:        6/10/2024 4:25 PM  RESPONDING  PROVIDER #:        ZION MALLOY          QUERY TEXT:    Pt admitted with chest pain.  Pt noted to have pneumonia in in  progress   note then dropped. If possible, please document in the progress notes and   discharge summary if you are evaluating and/or treating any of the following:    Note: CAP and HCAP indicate where the pneumonia was acquired, not a specific   type.    The medical record reflects the following:  Risk Factors: leukocytosis  Clinical Indicators: MD progress note documented Has leukocytosis will cover   for possible pneumonia gram-positive and anaerobic, XR shows  Progressing   opacities presumably related atelectasis medial lower right lung and   infiltrate or atelectasis right parahilar region and lower right lung.    Pneumonia is a consideration. Treated with antibiotic.  Treatment: IV Zosyn, Infectious disease and cardiothoracic surgery consult,   discharge on Ceftin, Flagyl and Vancomycin    Thank You Delma Peralta RN, CDS [email or web address]  Options provided:  -- Pneumonia confirmed after study  -- Pneumonia ruled out after study  -- Other - I will add my own diagnosis  -- Disagree - Not applicable / Not valid  -- Disagree - Clinically unable to determine / Unknown  -- Refer to Clinical Documentation Reviewer    PROVIDER RESPONSE TEXT:    Provider is clinically unable to determine a response to this query.  Post-operative lung changes s/p right lower lobe/right middle lobe lobectomy   with mediastinal lymph node dissection    Query created by: Delma Peralta on 2024 2:57 PM      Electronically signed by:  ZION MALLOY 2024 4:25 PM

## 2024-06-24 LAB — FUNGUS BLD CULT: NORMAL

## 2024-07-01 ENCOUNTER — HOSPITAL ENCOUNTER (OUTPATIENT)
Age: 54
Discharge: HOME | End: 2024-07-01
Payer: COMMERCIAL

## 2024-07-01 DIAGNOSIS — R60.0 BILATERAL LEG EDEMA: ICD-10-CM

## 2024-07-01 LAB
ANION GAP SERPL CALCULATED.3IONS-SCNC: 13 MMOL/L (ref 3–16)
BUN SERPL-MCNC: 11 MG/DL (ref 7–20)
CALCIUM SERPL-MCNC: 9.5 MG/DL (ref 8.3–10.6)
CHLORIDE SERPL-SCNC: 98 MMOL/L (ref 99–110)
CO2 SERPL-SCNC: 26 MMOL/L (ref 21–32)
CREAT SERPL-MCNC: 0.7 MG/DL (ref 0.6–1.1)
FUNGUS BLD CULT: NORMAL
GFR SERPLBLD CREATININE-BSD FMLA CKD-EPI: >90 ML/MIN/{1.73_M2}
GLUCOSE SERPL-MCNC: 104 MG/DL (ref 70–99)
NT-PROBNP SERPL-MCNC: 608 PG/ML (ref 0–124)
POTASSIUM SERPL-SCNC: 4.4 MMOL/L (ref 3.5–5.1)
SODIUM SERPL-SCNC: 137 MMOL/L (ref 136–145)

## 2024-07-01 PROCEDURE — 83880 ASSAY OF NATRIURETIC PEPTIDE: CPT

## 2024-07-01 PROCEDURE — 36415 COLL VENOUS BLD VENIPUNCTURE: CPT

## 2024-07-01 PROCEDURE — 80048 BASIC METABOLIC PNL TOTAL CA: CPT

## 2024-07-02 ENCOUNTER — TELEPHONE (OUTPATIENT)
Dept: PULMONOLOGY | Age: 54
End: 2024-07-02

## 2024-07-02 DIAGNOSIS — R60.0 BILATERAL LEG EDEMA: Primary | ICD-10-CM

## 2024-07-02 NOTE — TELEPHONE ENCOUNTER
Pt called asking to get the results of her labs (Potassium) and would also like to discuss the Lasix

## 2024-07-02 NOTE — TELEPHONE ENCOUNTER
Reviewed lab work.  Potassium is still within normal range.  Continue Lasix 40 mg daily.  Repeat BMP next week.  Has she noticed any improvement in leg edema?

## 2024-07-02 NOTE — PROGRESS NOTES
Physician Progress Note      PATIENT:               HARLEY GOODE  CSN #:                  [id number]  :                       1970  ADMIT DATE:       2024 6:31 PM  DISCH DATE:        6/10/2024 4:25 PM  RESPONDING  PROVIDER #:        Sonya Baker MD          QUERY TEXT:    Patient admitted with sepsis.  Noted documentation of Sepsis POA as per ID   notes and Discharge Summary. If possible, please document in progress notes   and discharge summary the source of sepsis:  The medical record reflects the following:  Risk Factors: noted s/p RML and RLL lobectomy, pathology positive for   necrotizing granulomatous inflammation as per ID notes 6/10  Clinical Indicators: Per ID progress notes through 6/10: \"right sided   hydropneumothorax, concern for empyema\"...\"low grade fever remains on IV   Zosyn\"...\"unfortunately I will not be able to stop the antibiotics   completely.\"  On arrival WBC 20.4, ,   Treatment: IV Zosyn, imaging, chest tube, chest wall wound culture, ID   consult, Pulm consult, discharged on PO ceftin and PO flagyl notes indicate   \"will provide anaerobic coverage against empyema\"  Options provided:  -- Sepsis POA due to empyema r/t recent RML/RLL lobectomy.  -- Sepsis POA due to emypema r/t necrotizing granulomatous inflammation of   lymph nodes and unrelated to lobectomy.  -- Other - I will add my own diagnosis  -- Disagree - Not applicable / Not valid  -- Disagree - Clinically unable to determine / Unknown  -- Refer to Clinical Documentation Reviewer    PROVIDER RESPONSE TEXT:    Provider is clinically unable to determine a response to this query.  to my knowledge this patient never had confirmed sepsis diagnosed objectively   with positive cultures of pleural fluid or bacteremia. CT scan findings could   also be expected in normal postop lung patient. We definitively DID NOT   diagnose empyema.    Query created by: Marycarmen Armstrong on 2024 1:16

## 2024-07-03 ENCOUNTER — TELEPHONE (OUTPATIENT)
Dept: CARDIOLOGY CLINIC | Age: 54
End: 2024-07-03

## 2024-07-03 NOTE — TELEPHONE ENCOUNTER
Pt calling in having some concerns with medications, she states they really hurt her stomach and she dry heaves quite a bit, she is not sure if it is the amiodarone or metoprolol tartrate and wants to be completely off the lipitor   Please call pt and discuss   Thank you

## 2024-07-03 NOTE — TELEPHONE ENCOUNTER
Have her stop the amiodarone for the next week. If symptoms resolve, then she can stop off of it. If her symptoms continue, then she should resume the amiodarone 200 mg daily. We did not start or manage her lipitor. She should discuss with her PCP    ELADIO Edouard-MARY JO

## 2024-07-06 DIAGNOSIS — K21.9 GASTROESOPHAGEAL REFLUX DISEASE, UNSPECIFIED WHETHER ESOPHAGITIS PRESENT: ICD-10-CM

## 2024-07-08 RX ORDER — FAMOTIDINE 20 MG/1
20 TABLET, FILM COATED ORAL NIGHTLY
Qty: 30 TABLET | Refills: 5 | Status: SHIPPED | OUTPATIENT
Start: 2024-07-08

## 2024-07-17 RX ORDER — FERROUS SULFATE 325(65) MG
1 TABLET ORAL 2 TIMES DAILY WITH MEALS
Qty: 180 TABLET | Refills: 1 | OUTPATIENT
Start: 2024-07-17

## 2024-07-20 PROBLEM — Z09 S/P LUNG SURGERY, FOLLOW-UP EXAM: Status: RESOLVED | Noted: 2024-06-20 | Resolved: 2024-07-20

## 2024-07-22 ENCOUNTER — TELEPHONE (OUTPATIENT)
Dept: PULMONOLOGY | Age: 54
End: 2024-07-22

## 2024-07-23 ENCOUNTER — TELEPHONE (OUTPATIENT)
Dept: CARDIOLOGY CLINIC | Age: 54
End: 2024-07-23

## 2024-07-23 ENCOUNTER — PATIENT MESSAGE (OUTPATIENT)
Dept: PULMONOLOGY | Age: 54
End: 2024-07-23

## 2024-07-23 NOTE — TELEPHONE ENCOUNTER
Pt called back, advised her the sig on the RX and advised her to call Dr. Salazar's office. She said she spoke to them and they told her to call us. She is going to call them back.

## 2024-07-23 NOTE — TELEPHONE ENCOUNTER
Pt is asking if she is to take lasix daily or as needed (Every couple of days.) States it makes her feel drained and she is concerned about her kidney's. Please call pt to discuss.

## 2024-07-23 NOTE — TELEPHONE ENCOUNTER
From: Cici Hernández  To: Dr. Harika Salazar  Sent: 7/23/2024 12:42 PM EDT  Subject: Pathology report of Biopsy of lung     My cancer policy I have with [employer] need the original report from Pathology of my biopsy that said I had stage 1. Can you please help me

## 2024-07-25 NOTE — TELEPHONE ENCOUNTER
I see CXR from 6/20/24. Is there a CXR after that?   
It is in care everywhere through [employer] [facility]. Done on 7/22/24. I can call [employer] if you would like to have the images pushed as well. It looks stable and normal but I think she is just wanting you to know she had a recent one done.  
Patient called and said Friday into Saturday she has had a cough and has been having white phlegm come up said it can be foamy after she eats. Wants to know what to do. Had chest x-ray 6/20    PH: [phone]  
Patient is still taking Protonix, Pepcid and Lasix?  
Sees Dr Jensen in Sept 2024. Last time she saw Dr Jensen was in the hospital in June. Stated she didn't realize she needed to take Lasix daily. She takes every other day or if her legs start to swell. She said she doesn't feel good when taking it often and worries about the effects on her kidneys. I let her know to contact Dr Jensen and let the office know she has only been taking lasix every other day so they can navigate what she should do. She will start Prilosec am and pepcid PM starting today.    She did have recent Chest xray she is going to have Dr Becker fax since we cannot see them in [facility] or care everywhere.   
She would need to take lasix every day. Is her leg swelling improving? Has she seen a cardiologist?  Continue prilosec in AM and pepcid at night.   
Stated she is taking Lasix but not daily. She is taking Pepcid every night. Protonix she isn't taking because her insurance doesn't cover it so she started Prilosec at Dr Salazar's request. She was taking 40 mg in the am. After taking for a while, she felt it wasn't helping so she quit taking it 2 weeks ago. Still having the foam that she is coughing up.   
2 yr oldpmh depression, bipolar, htn, hld c/o worsening depression and confusion over the last 2-3 days.last admission to [facility] back in 11/2022.  with past hx of BZD and alcohol use pertinent medical issues include: hypothyroidism, Breast cancer s/p lumpectomy, CAD with stent placement, GERD, and "pinched disc".  previously seen at [facility] in 2018 due to ingestion of Ambien, Klonopin and Valium

## 2024-08-05 ENCOUNTER — HOSPITAL ENCOUNTER (OUTPATIENT)
Dept: CT IMAGING | Age: 54
Discharge: HOME | End: 2024-08-05
Attending: INTERNAL MEDICINE
Payer: COMMERCIAL

## 2024-08-05 DIAGNOSIS — R91.1 LUNG NODULE: ICD-10-CM

## 2024-08-05 DIAGNOSIS — B39.4: ICD-10-CM

## 2024-08-05 PROCEDURE — 71250 CT THORAX DX C-: CPT

## 2024-08-09 ENCOUNTER — TELEPHONE (OUTPATIENT)
Dept: INFECTIOUS DISEASES | Age: 54
End: 2024-08-09

## 2024-08-09 NOTE — TELEPHONE ENCOUNTER
----- Message from Vinny Griggs MD sent at 8/8/2024  6:14 PM EDT -----  Right cerebral vision is improving.  Right lower lobe lobectomy changes noted on the CT scan.  Overall results are reassuring.  Let him know.    8/9/24 [address] [facility] for patient and advised of MD note above.  Office contact information provided.

## 2024-08-15 ENCOUNTER — OFFICE VISIT (OUTPATIENT)
Dept: INFECTIOUS DISEASES | Age: 54
End: 2024-08-15
Payer: COMMERCIAL

## 2024-08-15 VITALS
TEMPERATURE: 98.6 F | BODY MASS INDEX: 38.02 KG/M2 | HEIGHT: 65 IN | HEART RATE: 78 BPM | OXYGEN SATURATION: 97 % | SYSTOLIC BLOOD PRESSURE: 95 MMHG | DIASTOLIC BLOOD PRESSURE: 57 MMHG | WEIGHT: 228.2 LBS

## 2024-08-15 DIAGNOSIS — Z90.2 STATUS POST LOBECTOMY OF LUNG: ICD-10-CM

## 2024-08-15 DIAGNOSIS — I51.89 DIASTOLIC DYSFUNCTION: ICD-10-CM

## 2024-08-15 DIAGNOSIS — E66.9 OBESITY, CLASS II, BMI 35-39.9: ICD-10-CM

## 2024-08-15 DIAGNOSIS — Z87.891 EX-HEAVY CIGARETTE SMOKER (20-39 PER DAY): ICD-10-CM

## 2024-08-15 DIAGNOSIS — Z71.6 TOBACCO ABUSE COUNSELING: ICD-10-CM

## 2024-08-15 DIAGNOSIS — J90 PLEURAL EFFUSION, RIGHT: ICD-10-CM

## 2024-08-15 DIAGNOSIS — J90 PLEURAL EFFUSION, RIGHT: Primary | ICD-10-CM

## 2024-08-15 DIAGNOSIS — D3A.8 BENIGN NEUROENDOCRINE TUMOR OF LUNG: ICD-10-CM

## 2024-08-15 PROCEDURE — 99214 OFFICE O/P EST MOD 30 MIN: CPT | Performed by: INTERNAL MEDICINE

## 2024-08-15 ASSESSMENT — ENCOUNTER SYMPTOMS
EYE DISCHARGE: 0
WHEEZING: 0
CONSTIPATION: 0
NAUSEA: 0
SINUS PAIN: 0
DIARRHEA: 0
ABDOMINAL PAIN: 0
COUGH: 0
SORE THROAT: 0
RHINORRHEA: 0
SHORTNESS OF BREATH: 0
BACK PAIN: 0
SINUS PRESSURE: 0
EYE REDNESS: 0

## 2024-08-15 NOTE — PROGRESS NOTES
and independently interpreted the CT findings and compared to the prior CT scan from 6/7/2024.  Small loculated right pleural effusion noted postsurgical changes noted after the right lower lobe lobectomy.  The right lower lobe infiltrate has improved.    CT from 6/27/24:            CT from 8/5/24:        The patient had an HIV screening done on 5/29/2024.  It was negative.  Urine histoplasma antigen was negative on 5/29/2024.  Fungal blood culture done on the date was also negative.      RECOMMENDATIONS:      Diagnostic Workup:    Will order urine and serum histoplasma antigen test to be done today  Will order follow-up CT scan of the chest to be done without contrast in 6 months from now  Continue to follow fever curve  at home      Antimicrobials:    The patient is currently feeling well.  She has some sinus issues that started with the change of weather few days ago  The patient is also having some nasal congestion  No long-term coughing or fevers  No need for antifungals or antibiotics at this time  If above workup is negative, the patient can continue to follow-up with her PCP and pulmonology  She was advised to call my office to follow-up on the results.  She verbalizes understanding and is agreeable with above  Discussed with patient the strategies to stay safe from COVID 19 exposures including safe social distancing, frequent and proper hand hygiene when outside and using 3 layered mouth and nose coverings/facemasks when outside their home.      Labs ordered today in [facility]:    Orders Placed This Encounter   Procedures    CT CHEST WO CONTRAST     Standing Status:   Future     Standing Expiration Date:   8/15/2025    HISTOPLASMA ANTIGEN, URINE     Standing Status:   Future     Standing Expiration Date:   8/15/2025    HISTOPLASMA ANTIGEN, SERUM     Standing Status:   Future     Standing Expiration Date:   8/15/2025         Patient education and counseling:    The patient was educated in detail about the

## 2024-08-18 LAB
H CAPSUL AG UR IA-ACNC: NOT DETECTED NG/ML
H CAPSUL AG UR QL IA: NOT DETECTED

## 2024-08-27 RX ORDER — ALBUTEROL SULFATE 90 UG/1
AEROSOL, METERED RESPIRATORY (INHALATION)
COMMUNITY
Start: 2024-07-22

## 2024-08-27 NOTE — PROGRESS NOTES
[facility] PRE-OPERATIVE INSTRUCTIONS       DOS: __9/4/2024__        Pre-Op Instructions     Patients receiving local anesthetic only will arrive one hour prior to the procedure, all other patients will arrive 1.5 hours prior to procedure time.    [x]  A History and Physical will be required within 30 days prior to surgery date. Some patients may require cardiac or pulmonary clearance. H&P will be completed DOS for Endo/colonoscopy patients.     [x]  Reviewed Medical and Surgical history, medication list, confirmed with patient any implants, allergies, bleeding disorders, SALMA and reactions to Anesthesia.    [x]  If there is a change in physical condition between now and the day of surgery, please notify your surgeon. This includes a cough, cold, fever, sore throat, nausea, vomiting and diarrhea. Also notify your surgeon if you experience dizziness, shortness of breath or blurred vision.    [x] Reviewed hx of C-Diff, MRSA, VRE and/or recent use of Antibiotics     [x]  All patients having a procedure must have a ride home by a responsible person that is over the age of 18 and ensure it is someone that we can share medical information with. After discharge, a responsible adult needs to stay with you for 24 hours. There is a limit of 2 adult visitors per room.     If unable to secure ride and/or care taker, please contact surgeon's office.      [x]  No alcohol, smoking or marijuana use 24 hours prior to surgery. Any use of recreational drugs must be stopped 5 days prior to surgery.     [x]  NPO after midnight (Any heart, BP, seizure, thyroid and breathing medications are okay to take the morning of surgery with a small sip of water 4 hours prior to procedure).    The morning of surgery, you may brush your teeth, just no swallowing water. Also, NO gum, candy, mints or ice chips.    [x]  For Colonoscopy's, follow prep-instructions as indicated by physician.     []  Patients with a insulin pump, keep set on

## 2024-09-03 ENCOUNTER — ANESTHESIA EVENT (OUTPATIENT)
Age: 54
End: 2024-09-03
Payer: COMMERCIAL

## 2024-09-04 ENCOUNTER — ANESTHESIA (OUTPATIENT)
Age: 54
End: 2024-09-04
Payer: COMMERCIAL

## 2024-09-04 ENCOUNTER — HOSPITAL ENCOUNTER (OUTPATIENT)
Age: 54
Setting detail: OUTPATIENT SURGERY
Discharge: HOME | End: 2024-09-04
Attending: INTERNAL MEDICINE | Admitting: INTERNAL MEDICINE
Payer: COMMERCIAL

## 2024-09-04 VITALS
TEMPERATURE: 97.8 F | SYSTOLIC BLOOD PRESSURE: 102 MMHG | HEIGHT: 65 IN | HEART RATE: 83 BPM | DIASTOLIC BLOOD PRESSURE: 75 MMHG | OXYGEN SATURATION: 100 % | WEIGHT: 228 LBS | RESPIRATION RATE: 16 BRPM | BODY MASS INDEX: 37.99 KG/M2

## 2024-09-04 DIAGNOSIS — Z12.11 COLON CANCER SCREENING: ICD-10-CM

## 2024-09-04 DIAGNOSIS — R13.12 OROPHARYNGEAL DYSPHAGIA: ICD-10-CM

## 2024-09-04 PROCEDURE — 6360000002 HC RX W HCPCS: Performed by: NURSE ANESTHETIST, CERTIFIED REGISTERED

## 2024-09-04 PROCEDURE — 88305 TISSUE EXAM BY PATHOLOGIST: CPT

## 2024-09-04 PROCEDURE — 3609010600 HC COLONOSCOPY POLYPECTOMY SNARE/COLD BIOPSY: Performed by: INTERNAL MEDICINE

## 2024-09-04 PROCEDURE — 7100000011 HC PHASE II RECOVERY - ADDTL 15 MIN: Performed by: INTERNAL MEDICINE

## 2024-09-04 PROCEDURE — 7100000010 HC PHASE II RECOVERY - FIRST 15 MIN: Performed by: INTERNAL MEDICINE

## 2024-09-04 PROCEDURE — 3609017700 HC EGD DILATION GASTRIC/DUODENAL STRICTURE: Performed by: INTERNAL MEDICINE

## 2024-09-04 PROCEDURE — 2580000003 HC RX 258: Performed by: NURSE ANESTHETIST, CERTIFIED REGISTERED

## 2024-09-04 PROCEDURE — 3609012400 HC EGD TRANSORAL BIOPSY SINGLE/MULTIPLE: Performed by: INTERNAL MEDICINE

## 2024-09-04 PROCEDURE — 2709999900 HC NON-CHARGEABLE SUPPLY: Performed by: INTERNAL MEDICINE

## 2024-09-04 PROCEDURE — 3700000001 HC ADD 15 MINUTES (ANESTHESIA): Performed by: INTERNAL MEDICINE

## 2024-09-04 PROCEDURE — 3700000000 HC ANESTHESIA ATTENDED CARE: Performed by: INTERNAL MEDICINE

## 2024-09-04 RX ORDER — LIDOCAINE HYDROCHLORIDE 20 MG/ML
INJECTION, SOLUTION INTRAVENOUS PRN
Status: DISCONTINUED | OUTPATIENT
Start: 2024-09-04 | End: 2024-09-04 | Stop reason: SDUPTHER

## 2024-09-04 RX ORDER — NALOXONE HYDROCHLORIDE 0.4 MG/ML
INJECTION, SOLUTION INTRAMUSCULAR; INTRAVENOUS; SUBCUTANEOUS PRN
Status: CANCELLED | OUTPATIENT
Start: 2024-09-04

## 2024-09-04 RX ORDER — SODIUM CHLORIDE 9 MG/ML
INJECTION, SOLUTION INTRAVENOUS PRN
Status: CANCELLED | OUTPATIENT
Start: 2024-09-04

## 2024-09-04 RX ORDER — SODIUM CHLORIDE 9 MG/ML
INJECTION, SOLUTION INTRAVENOUS CONTINUOUS PRN
Status: DISCONTINUED | OUTPATIENT
Start: 2024-09-04 | End: 2024-09-04 | Stop reason: SDUPTHER

## 2024-09-04 RX ORDER — SODIUM CHLORIDE 0.9 % (FLUSH) 0.9 %
5-40 SYRINGE (ML) INJECTION EVERY 12 HOURS SCHEDULED
Status: CANCELLED | OUTPATIENT
Start: 2024-09-04

## 2024-09-04 RX ORDER — PROPOFOL 10 MG/ML
INJECTION, EMULSION INTRAVENOUS PRN
Status: DISCONTINUED | OUTPATIENT
Start: 2024-09-04 | End: 2024-09-04 | Stop reason: SDUPTHER

## 2024-09-04 RX ORDER — PANTOPRAZOLE SODIUM 40 MG/1
40 TABLET, DELAYED RELEASE ORAL
Qty: 90 TABLET | Refills: 2 | Status: SHIPPED | OUTPATIENT
Start: 2024-09-04

## 2024-09-04 RX ORDER — SODIUM CHLORIDE 9 MG/ML
INJECTION, SOLUTION INTRAVENOUS PRN
Status: DISCONTINUED | OUTPATIENT
Start: 2024-09-04 | End: 2024-09-04 | Stop reason: HOSPADM

## 2024-09-04 RX ORDER — ONDANSETRON 2 MG/ML
4 INJECTION INTRAMUSCULAR; INTRAVENOUS
Status: CANCELLED | OUTPATIENT
Start: 2024-09-04 | End: 2024-09-05

## 2024-09-04 RX ORDER — DROPERIDOL 2.5 MG/ML
0.62 INJECTION, SOLUTION INTRAMUSCULAR; INTRAVENOUS
Status: CANCELLED | OUTPATIENT
Start: 2024-09-04 | End: 2024-09-05

## 2024-09-04 RX ORDER — SODIUM CHLORIDE 0.9 % (FLUSH) 0.9 %
5-40 SYRINGE (ML) INJECTION PRN
Status: DISCONTINUED | OUTPATIENT
Start: 2024-09-04 | End: 2024-09-04 | Stop reason: HOSPADM

## 2024-09-04 RX ORDER — SODIUM CHLORIDE 0.9 % (FLUSH) 0.9 %
5-40 SYRINGE (ML) INJECTION PRN
Status: CANCELLED | OUTPATIENT
Start: 2024-09-04

## 2024-09-04 RX ORDER — GLYCOPYRROLATE 0.2 MG/ML
INJECTION INTRAMUSCULAR; INTRAVENOUS PRN
Status: DISCONTINUED | OUTPATIENT
Start: 2024-09-04 | End: 2024-09-04 | Stop reason: SDUPTHER

## 2024-09-04 RX ORDER — SODIUM CHLORIDE 0.9 % (FLUSH) 0.9 %
5-40 SYRINGE (ML) INJECTION EVERY 12 HOURS SCHEDULED
Status: DISCONTINUED | OUTPATIENT
Start: 2024-09-04 | End: 2024-09-04 | Stop reason: HOSPADM

## 2024-09-04 RX ADMIN — SODIUM CHLORIDE: 9 INJECTION, SOLUTION INTRAVENOUS at 09:26

## 2024-09-04 RX ADMIN — PROPOFOL 10 MG: 10 INJECTION, EMULSION INTRAVENOUS at 09:33

## 2024-09-04 RX ADMIN — LIDOCAINE HYDROCHLORIDE 100 MG: 20 INJECTION, SOLUTION INTRAVENOUS at 09:31

## 2024-09-04 RX ADMIN — PROPOFOL 200 MCG/KG/MIN: 10 INJECTION, EMULSION INTRAVENOUS at 09:32

## 2024-09-04 RX ADMIN — GLYCOPYRROLATE 0.2 MG: 0.2 INJECTION INTRAMUSCULAR; INTRAVENOUS at 09:59

## 2024-09-04 RX ADMIN — PROPOFOL 90 MG: 10 INJECTION, EMULSION INTRAVENOUS at 09:31

## 2024-09-04 ASSESSMENT — PAIN - FUNCTIONAL ASSESSMENT: PAIN_FUNCTIONAL_ASSESSMENT: 0-10

## 2024-09-04 ASSESSMENT — ENCOUNTER SYMPTOMS: DYSPNEA ACTIVITY LEVEL: AFTER AMBULATING 1 FLIGHT OF STAIRS

## 2024-09-04 NOTE — ANESTHESIA PRE PROCEDURE
Department of Anesthesiology  Preprocedure Note       Name:  Cici Hernández   Age:  54 y.o.  :  1970                                          MRN:  [id number]         Date:  2024      Surgeon: Surgeon(s):  Alvaro Dudley MD    Procedure: Procedure(s):  ESOPHAGOGASTRODUODENOSCOPY  COLONOSCOPY    Medications prior to admission:   Prior to Admission medications    Medication Sig Start Date End Date Taking? Authorizing Provider   albuterol sulfate HFA (PROVENTIL;VENTOLIN;PROAIR) 108 (90 Base) MCG/ACT inhaler INHALE 2 PUFFS AS DIRECTED EVERY 6 HOURS AS NEEDED FOR SHORTNESS OF BREATH 24  Yes Yaw Rolle MD   famotidine (PEPCID) 20 MG tablet TAKE 1 TABLET BY MOUTH EVERY DAY AT NIGHT 24  Yes Jair Everett MD   furosemide (LASIX) 40 MG tablet Take 1 tablet by mouth daily 24  Yes Harika Salazar MD   metoprolol tartrate (LOPRESSOR) 25 MG tablet Take 0.5 tablets by mouth 2 times daily 6/10/24  Yes Junior Esquivel PA-C   aspirin 81 MG chewable tablet Take 1 tablet by mouth daily 24   Junior Esquivel PA-C       Current medications:    Current Facility-Administered Medications   Medication Dose Route Frequency Provider Last Rate Last Admin    sodium chloride flush 0.9 % injection 5-40 mL  5-40 mL IntraVENous 2 times per day Carlos Mccormack MD        sodium chloride flush 0.9 % injection 5-40 mL  5-40 mL IntraVENous PRN Carlos Mccormack MD        0.9 % sodium chloride infusion   IntraVENous PRN Carlos Mccormack MD           Allergies:    Allergies   Allergen Reactions    Naproxen Anaphylaxis, Hives and Shortness Of Breath       Problem List:    Patient Active Problem List   Diagnosis Code    Lung nodule R91.1    Lung mass R91.8    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 B39.4    Benign neuroendocrine tumor of lung D3A.8    Diastolic dysfunction I51.89    Ex-heavy cigarette smoker (20-39 per day) Z87.891    Class 2 obesity due to excess calories with body mass index (BMI) of 39.0 to 39.9 in adult

## 2024-09-04 NOTE — PROGRESS NOTES
Discharge instructions reviewed, patient verbalized understanding, IV removed, patient escorted to waiting transportation via wheelchair.

## 2024-09-04 NOTE — PROGRESS NOTES
Patient arrived to Pre Op [facility] 3 post procedure, patient responsive to voice, report received from Endo Nurse Aleah and JASON Lorenz at bedside, VSS, oxygen saturation remains greater than 95% on room air.  Patient mother at bedside.   31-Jan-2022

## 2024-09-04 NOTE — PROGRESS NOTES
[facility] has verified that procedural consent is correctly filled out prior to the start of the procedure.   esophageal bx rule out barretts vs cancer   Gastric bx rule out h pylori

## 2024-09-04 NOTE — H&P
Gastroenterology Note             Pre-operative History and Physical    Patient: Cici Hernández  : 1970  CSN:     History Obtained From:  patient and/or guardian.     HISTORY OF PRESENT ILLNESS:    The patient is a 54 y.o. female  here for EGD/colonoscopy.   This very pleasant 54-year-old female comes in today for an upper and lower endoscopy she has had a history of having a lung resection with a carcinoid tumor she is having reflux and difficulty swallowing and she has never had a colonoscopy    Past Medical History:    Past Medical History:   Diagnosis Date    Atrial fibrillation (HCC)     x 1    Edema     left leg    Lung nodule     right    Smoker     former, [facility]     Past Surgical History:    Past Surgical History:   Procedure Laterality Date    CARPAL TUNNEL RELEASE Right     CHOLECYSTECTOMY      with hiatal hernia rep    CT GUIDED CHEST TUBE  2024    CT GUIDED CHEST TUBE 2024 [facility] CT SCAN    CT NEEDLE BIOPSY LUNG PERCUTANEOUS  2024    CT NEEDLE BIOPSY LUNG PERCUTANEOUS 2024 [facility] CT SCAN    LUNG SURGERY Right 2024    ROBOTIC ASSISTED RIGHT LOWER LOBE LOBECTOMY WITH MEDIASTINAL LYMPH NODE DISSECTION performed by Sonya Milan MD at [facility] OR    NASAL FRACTURE SURGERY      closed reduction    TUBAL LIGATION       Medications Prior to Admission:   No current facility-administered medications on file prior to encounter.     Current Outpatient Medications on File Prior to Encounter   Medication Sig Dispense Refill    albuterol sulfate HFA (PROVENTIL;VENTOLIN;PROAIR) 108 (90 Base) MCG/ACT inhaler INHALE 2 PUFFS AS DIRECTED EVERY 6 HOURS AS NEEDED FOR SHORTNESS OF BREATH      famotidine (PEPCID) 20 MG tablet TAKE 1 TABLET BY MOUTH EVERY DAY AT NIGHT 30 tablet 5    furosemide (LASIX) 40 MG tablet Take 1 tablet by mouth daily 60 tablet 3    metoprolol tartrate (LOPRESSOR) 25 MG tablet Take 0.5 tablets by mouth 2 times daily 60 tablet 3    aspirin 81 MG chewable tablet Take 1  tablet by mouth daily 30 tablet 3        Allergies:  Naproxen      Social History:   Social History     Tobacco Use    Smoking status: Former     Current packs/day: 0.00     Average packs/day: 1 pack/day for 28.3 years (28.3 ttl pk-yrs)     Types: Cigarettes     Start date:      Quit date: 2024     Years since quittin.3    Smokeless tobacco: Never    Tobacco comments:     Has not smoked since 22.    Substance Use Topics    Alcohol use: Yes     Comment: rarely     Family History:   Family History   Problem Relation Age of Onset    Breast Cancer Mother         over 50    Anesth Problems Maternal Grandmother         prolonged emergence with anesthesia       PHYSICAL EXAM:      /80   Pulse 75   Temp 97.9 °F (36.6 °C) (Infrared)   Resp 18   Ht 1.651 m (5' 5\")   Wt 103.4 kg (228 lb)   SpO2 97%   BMI 37.94 kg/m²  I        Heart:   RRR, normal s1s2    Lungs:  CTA bilat,  Normal effort    Abdomen:   NT, ND      ASA Grade:  ASA 3 - Patient with moderate systemic disease with functional limitations    Mallampati Class: 2          ASSESSMENT AND PLAN:    1.  Patient is a 54 y.o. female here for EGD/Colonoscopy with MAC.   2.  Procedure options, risks and benefits reviewed with patient.  Patient expresses understanding.    Alvaro Dudley MD,   [facility]  2024

## 2024-09-04 NOTE — DISCHARGE INSTRUCTIONS
ENDOSCOPY DISCHARGE INSTRUCTIONS:    Call the physician that did your procedure for any questions or concern:    [facility]: [phone]  DR. KIAN BRANDT       ACTIVITY:    There are potential side effects to the medications used for sedation and anesthesia during your procedure.  These include:  Dizziness or light-headedness, confusion or memory loss, delayed reaction times, loss of coordination, nausea and vomiting.  Because of your increased risk for injury, we ask that you observe the following precautions:  For the next 24 hours,  DO NOT operate an automobile, bicycle, motorcycle, , power tools or large equipment of any kind.  Do not drink alcohol, sign any legal documents or make any legal decisions for 24 hours.  Do not bend your head over lower than your heart.  DO sit on the side of bed/couch awhile before getting up.  Plan on bedrest or quiet relaxation today.  You may resume normal activities in 24 hours.    DIET:    Your first meal today should be light, avoiding spicy and fatty foods.  If you tolerate this first meal, then you may advance to your regular diet unless otherwise advised by your physician.    NORMAL SYMPTOMS:  -Mild sore throat if you’ve had an EGD   -Gaseous discomfort    NOTIFY YOUR PHYSICIAN IF THESE SYMPTOMS OCCUR:  1. Fever (greater than 100)  5. Increased abdominal bloating  2. Severe pain    6. Excessive bleeding  3. Nausea and vomiting  7. Chest pain                                                                    4. Chills    8. Shortness of breath    ADDITIONAL INSTRUCTIONS:    Biopsy results: Call [facility] for biopsy results in 1 week      Please review these discharge instructions this evening or tomorrow for  information you may have forgotten.            We want to thank you for choosing the [facility] as your health care provider. We always strive to provide you with excellent care while you are here. You may receive a survey in the mail  regarding your care. We would appreciate you taking a few minutes of your time to complete this survey.   Cigarettes

## 2024-09-04 NOTE — ANESTHESIA POSTPROCEDURE EVALUATION
Department of Anesthesiology  Postprocedure Note    Patient: Cici Hernández  MRN: [id number]  YOB: 1970  Date of evaluation: 9/4/2024    Procedure Summary       Date: 09/04/24 Room / Location: [address]    Anesthesia Start: 0926 Anesthesia Stop: 1019    Procedures:       ESOPHAGOGASTRODUODENOSCOPY DILATATION      COLONOSCOPY POLYPECTOMY SNARE/BIOPSY      ESOPHAGOGASTRODUODENOSCOPY BIOPSY Diagnosis:       Oropharyngeal dysphagia      Colon cancer screening      (Oropharyngeal dysphagia [R13.12])      (Colon cancer screening [Z12.11])    Surgeons: Alvaro Dudley MD Responsible Provider: Chris Mckeon MD    Anesthesia Type: MAC ASA Status: 3            Anesthesia Type: No value filed.    Nik Phase I: Nik Score: 10    Nik Phase II: Nik Score: 10    Anesthesia Post Evaluation    Patient location during evaluation: PACU  Patient participation: complete - patient participated  Level of consciousness: awake  Airway patency: patent  Nausea & Vomiting: no nausea and no vomiting  Cardiovascular status: blood pressure returned to baseline  Respiratory status: acceptable  Hydration status: stable  Comments: Vital signs stable  OK to discharge from Stage I post anesthesia care.  Care transferred from Anesthesiology department on discharge from perioperative area   Multimodal analgesia pain management approach  Pain management: satisfactory to patient    No notable events documented.

## 2024-09-04 NOTE — PROGRESS NOTES
Patient admitted to Pre-Op 03 in preparation for procedure, VSS. Belongings at bedside with family. NPO since 0230.

## 2024-09-05 LAB — SURGICAL PATHOLOGY REPORT: NORMAL

## 2024-09-18 ENCOUNTER — HOSPITAL ENCOUNTER (OUTPATIENT)
Age: 54
Discharge: HOME | End: 2024-09-18
Payer: COMMERCIAL

## 2024-09-18 ENCOUNTER — HOSPITAL ENCOUNTER (OUTPATIENT)
Dept: GENERAL RADIOLOGY | Age: 54
Discharge: HOME | End: 2024-09-18
Payer: COMMERCIAL

## 2024-09-18 DIAGNOSIS — R52 PAIN: ICD-10-CM

## 2024-09-18 PROCEDURE — 71046 X-RAY EXAM CHEST 2 VIEWS: CPT

## 2024-09-19 ENCOUNTER — OFFICE VISIT (OUTPATIENT)
Dept: CARDIOLOGY CLINIC | Age: 54
End: 2024-09-19
Payer: COMMERCIAL

## 2024-09-19 VITALS
HEART RATE: 94 BPM | SYSTOLIC BLOOD PRESSURE: 128 MMHG | DIASTOLIC BLOOD PRESSURE: 78 MMHG | BODY MASS INDEX: 39 KG/M2 | OXYGEN SATURATION: 100 % | HEIGHT: 65 IN | WEIGHT: 234.1 LBS

## 2024-09-19 DIAGNOSIS — I48.0 PAROXYSMAL ATRIAL FIBRILLATION (HCC): ICD-10-CM

## 2024-09-19 DIAGNOSIS — I48.0 PAF (PAROXYSMAL ATRIAL FIBRILLATION) (HCC): Primary | ICD-10-CM

## 2024-09-19 DIAGNOSIS — E66.09 CLASS 2 OBESITY DUE TO EXCESS CALORIES WITHOUT SERIOUS COMORBIDITY WITH BODY MASS INDEX (BMI) OF 39.0 TO 39.9 IN ADULT: ICD-10-CM

## 2024-09-19 PROCEDURE — 99214 OFFICE O/P EST MOD 30 MIN: CPT | Performed by: INTERNAL MEDICINE

## 2024-09-19 PROCEDURE — 93000 ELECTROCARDIOGRAM COMPLETE: CPT | Performed by: INTERNAL MEDICINE

## 2024-09-20 ENCOUNTER — OFFICE VISIT (OUTPATIENT)
Dept: PULMONOLOGY | Age: 54
End: 2024-09-20
Payer: COMMERCIAL

## 2024-09-20 VITALS
WEIGHT: 234.8 LBS | HEART RATE: 93 BPM | DIASTOLIC BLOOD PRESSURE: 78 MMHG | BODY MASS INDEX: 39.12 KG/M2 | HEIGHT: 65 IN | OXYGEN SATURATION: 97 % | SYSTOLIC BLOOD PRESSURE: 108 MMHG

## 2024-09-20 DIAGNOSIS — D3A.090 BENIGN CARCINOID TUMOR OF LUNG: ICD-10-CM

## 2024-09-20 DIAGNOSIS — J43.2 CENTRILOBULAR EMPHYSEMA (HCC): Primary | ICD-10-CM

## 2024-09-20 DIAGNOSIS — R60.0 BILATERAL LEG EDEMA: ICD-10-CM

## 2024-09-20 DIAGNOSIS — Z87.891 FORMER SMOKER: ICD-10-CM

## 2024-09-20 PROCEDURE — 99214 OFFICE O/P EST MOD 30 MIN: CPT | Performed by: INTERNAL MEDICINE

## 2024-09-26 ENCOUNTER — HOSPITAL ENCOUNTER (OUTPATIENT)
Dept: PULMONOLOGY | Age: 54
Discharge: HOME | End: 2024-09-26
Attending: INTERNAL MEDICINE
Payer: COMMERCIAL

## 2024-09-26 DIAGNOSIS — J43.2 CENTRILOBULAR EMPHYSEMA (HCC): ICD-10-CM

## 2024-09-26 LAB
DLCO %PRED: 51 %
DLCO PRED: NORMAL
DLCO/VA %PRED: NORMAL
DLCO/VA PRED: NORMAL
DLCO/VA: NORMAL
DLCO: NORMAL
EXPIRATORY TIME-POST: NORMAL
EXPIRATORY TIME: NORMAL
FEF 25-75 %CHNG: NORMAL
FEF 25-75 POST %PRED: NORMAL
FEF 25-75% %PRED-PRE: NORMAL
FEF 25-75% PRED: NORMAL
FEF 25-75-POST: NORMAL
FEF 25-75-PRE: NORMAL
FEV1 %PRED-POST: 53 %
FEV1 %PRED-PRE: 58 %
FEV1 PRED: NORMAL
FEV1-POST: NORMAL
FEV1-PRE: NORMAL
FEV1/FVC %PRED-POST: NORMAL
FEV1/FVC %PRED-PRE: NORMAL
FEV1/FVC PRED: NORMAL
FEV1/FVC-POST: 83 %
FEV1/FVC-PRE: 84 %
FVC %PRED-POST: NORMAL
FVC %PRED-PRE: NORMAL
FVC PRED: NORMAL
FVC-POST: NORMAL
FVC-PRE: NORMAL
GAW %PRED: NORMAL
GAW PRED: NORMAL
GAW: NORMAL
IC PRE %PRED: NORMAL
IC PRED: NORMAL
IC: NORMAL
MEP: NORMAL
MIP: NORMAL
MVV %PRED-PRE: NORMAL
MVV PRED: NORMAL
MVV-PRE: NORMAL
PEF %PRED-POST: NORMAL
PEF %PRED-PRE: NORMAL
PEF PRED: NORMAL
PEF%CHNG: NORMAL
PEF-POST: NORMAL
PEF-PRE: NORMAL
RAW %PRED: NORMAL
RAW PRED: NORMAL
RAW: NORMAL
RV PRE %PRED: NORMAL
RV PRED: NORMAL
RV: NORMAL
SVC %PRED: NORMAL
SVC PRED: NORMAL
SVC: NORMAL
TLC PRE %PRED: 63 %
TLC PRED: NORMAL
TLC: NORMAL
VA %PRED: NORMAL
VA PRED: NORMAL
VA: NORMAL
VTG %PRED: NORMAL
VTG PRED: NORMAL
VTG: NORMAL

## 2024-09-26 PROCEDURE — 94760 N-INVAS EAR/PLS OXIMETRY 1: CPT

## 2024-09-26 PROCEDURE — 94729 DIFFUSING CAPACITY: CPT

## 2024-09-26 PROCEDURE — 94726 PLETHYSMOGRAPHY LUNG VOLUMES: CPT

## 2024-09-26 PROCEDURE — 6370000000 HC RX 637 (ALT 250 FOR IP): Performed by: INTERNAL MEDICINE

## 2024-09-26 PROCEDURE — 94060 EVALUATION OF WHEEZING: CPT

## 2024-09-26 RX ORDER — ALBUTEROL SULFATE 90 UG/1
4 INHALANT RESPIRATORY (INHALATION) ONCE
Status: COMPLETED | OUTPATIENT
Start: 2024-09-26 | End: 2024-09-26

## 2024-09-26 RX ADMIN — Medication 4 PUFF: at 10:38

## 2024-09-26 ASSESSMENT — PULMONARY FUNCTION TESTS
FEV1/FVC_PRE: 84
FEV1_PERCENT_PREDICTED_POST: 53
FEV1_PERCENT_PREDICTED_PRE: 58
FEV1/FVC_POST: 83

## 2024-12-19 ENCOUNTER — TELEPHONE (OUTPATIENT)
Dept: CARDIOLOGY CLINIC | Age: 54
End: 2024-12-19

## 2024-12-19 ENCOUNTER — OFFICE VISIT (OUTPATIENT)
Dept: PULMONOLOGY | Age: 54
End: 2024-12-19

## 2024-12-19 VITALS
BODY MASS INDEX: 40.35 KG/M2 | HEART RATE: 94 BPM | SYSTOLIC BLOOD PRESSURE: 122 MMHG | OXYGEN SATURATION: 96 % | HEIGHT: 65 IN | DIASTOLIC BLOOD PRESSURE: 82 MMHG | WEIGHT: 242.2 LBS

## 2024-12-19 DIAGNOSIS — Z87.891 FORMER SMOKER: ICD-10-CM

## 2024-12-19 DIAGNOSIS — J43.2 CENTRILOBULAR EMPHYSEMA (HCC): ICD-10-CM

## 2024-12-19 DIAGNOSIS — D3A.090 BENIGN CARCINOID TUMOR OF LUNG: Primary | ICD-10-CM

## 2024-12-19 DIAGNOSIS — R60.0 BILATERAL LEG EDEMA: ICD-10-CM

## 2024-12-19 RX ORDER — FLUTICASONE FUROATE, UMECLIDINIUM BROMIDE AND VILANTEROL TRIFENATATE 200; 62.5; 25 UG/1; UG/1; UG/1
1 POWDER RESPIRATORY (INHALATION) DAILY
COMMUNITY
Start: 2024-11-05

## 2024-12-19 RX ORDER — HYDROXYZINE HYDROCHLORIDE 25 MG/1
25 TABLET, FILM COATED ORAL EVERY 4 HOURS PRN
COMMUNITY
Start: 2024-12-18

## 2024-12-19 NOTE — PROGRESS NOTES
PULMONARY OFFICE FOLLOW UP NOTE    REASON FOR VISIT:   Chief Complaint   Patient presents with    Malignant Neoplasm lower Right Lung     Breathing not good. SOB everyday, certain days worse than others. Stated she has SOB even without exertion.       DATE OF VISIT: 12/19/2024    HISTORY OF PRESENT ILLNESS: 54 y.o. year old female   is here for follow-up of emphysema and carcinoid tumor.     Patient patient continues to have dyspnea on exertion.  She states that she cannot walk from 1 room to another room or climb stairs without getting short of breath.  Denies any cough or wheezing or chest pain or hemoptysis.    Recently her primary care physician put her on Trelegy Ellipta once a day with which she has noticed minimal improvement in dyspnea on exertion.  PFT from October 2024 were suggestive of restrictive lung disease with reduced diffusion capacity.    She does have leg edema.  She does not take Lasix every day.       Patient had CT abdomen performed in December 2023 that showed right lower lobe 1.2 x 1 cm lung nodule.  This was followed by on CT chest in 3 months on 3/11/2024 that showed the right lower lobe nodule has slightly increased in size now measuring 1.5 x 1.2 cm.   Patient had PET/CT performed on 3/25/2024 that showed that the nodule was mildly PET positive.  No PET positive mediastinal lymphadenopathy was noted.      She underwent CT-guided biopsy on 4/16/2024 of right lower lobe lung nodule which was positive for neoplasm.  Very scant atypical cells with crush artifact were noted on the biopsies.  Immunostains were attempted, however, difficult to interpret.  Given the morphology, neuroendocrine neoplasm is in consideration.     Patient underwent right lower lobe/right middle lobe lobectomy with mediastinal lymph node dissection on 5/22.  Pathology findings suggestive of typical carcinoid, neuroendocrine tumor grade 1.  4R lymph node positive for typical carcinoid.  Station 7 lymph node

## 2024-12-19 NOTE — TELEPHONE ENCOUNTER
Patient saw Dr. Salazar today and she told the patient she should wear the monitor sooner than 3/19/25.  Patient has a watch that does and EKG and has shown afib 3-4 per day for the past 3 days.  Please call patient and advise.

## 2025-01-24 ENCOUNTER — HOSPITAL ENCOUNTER (OUTPATIENT)
Dept: CT IMAGING | Age: 55
Discharge: HOME | End: 2025-01-24
Attending: INTERNAL MEDICINE
Payer: COMMERCIAL

## 2025-01-24 DIAGNOSIS — D3A.090 BENIGN CARCINOID TUMOR OF LUNG: ICD-10-CM

## 2025-01-24 PROCEDURE — 71250 CT THORAX DX C-: CPT

## 2025-01-24 NOTE — RESULT ENCOUNTER NOTE
Please let the patient know that CT chest looks good.  No significant findings on CT chest to explain patient's shortness of breath.

## (undated) DEVICE — SUREFORM 45 RELOAD WHITE: Brand: SUREFORM

## (undated) DEVICE — ELECTRODE ELECSURG L 10.2 CM PTFE COAT MONOPOLAR BLADE OPN

## (undated) DEVICE — LOOP,VESSEL,MAXI,BLUE,2/PK,STERILE: Brand: MEDLINE

## (undated) DEVICE — STAPLER 60: Brand: SUREFORM

## (undated) DEVICE — SUREFORM 45 RELOAD BLACK: Brand: SUREFORM

## (undated) DEVICE — STAPLER 30 RELOAD WHITE: Brand: ENDOWRIST

## (undated) DEVICE — SUTURE VICRYL + SZ 0 L27IN ABSRB VLT L26MM UR-6 5/8 CIR VCP603H

## (undated) DEVICE — BLADE ES L4IN INSUL EDGE

## (undated) DEVICE — SUTURE VICRYL + SZ 0 L27IN ABSRB UD CT-1 L36MM 1/2 CIR TAPR VCP260H

## (undated) DEVICE — BLADELESS OBTURATOR: Brand: WECK VISTA

## (undated) DEVICE — ARM DRAPE

## (undated) DEVICE — 3M™ STERI-DRAPE™ INSTRUMENT POUCH 1018: Brand: STERI-DRAPE™

## (undated) DEVICE — 30978 SEE SHARP - ENHANCED INTRAOPERATIVE LAPAROSCOPE CLEANING & DEFOGGING: Brand: 30978 SEE SHARP - ENHANCED INTRAOPERATIVE LAPAROSCOPE CLEANING & DEFOGGING

## (undated) DEVICE — 3M™ IOBAN™ 2 ANTIMICROBIAL INCISE DRAPE 6651EZ: Brand: IOBAN™ 2

## (undated) DEVICE — TUBING, SUCTION, 1/4" X 12', STRAIGHT: Brand: MEDLINE

## (undated) DEVICE — CONNECTOR PERF W0.25XH3/8IN BASE Y SHP REDUC W/O LUERLOCK

## (undated) DEVICE — Device

## (undated) DEVICE — YANKAUER,BULB TIP,W/O VENT,RIGID,STERILE: Brand: MEDLINE

## (undated) DEVICE — COVER,TABLE,77X90,STERILE: Brand: MEDLINE

## (undated) DEVICE — TRI-LUMEN FILTERED TUBE SET WITH ACTIVATED CHARCOAL FILTER: Brand: AIRSEAL

## (undated) DEVICE — BLANKET WRM W40.2XL55.9IN IORT LO BODY + MISTRAL AIR

## (undated) DEVICE — STAPLER 60 RELOAD BLACK: Brand: SUREFORM

## (undated) DEVICE — ENDOSCOPIC KIT 1.1+ 6 FT 2 GWN AAMI LEVEL 3

## (undated) DEVICE — STAPLER SKIN LN REINF 60 MM ECHELON ENDOPATH

## (undated) DEVICE — AIR/WATER CLEANING ADAPTER FOR OLYMPUS® GI ENDOSCOPE: Brand: BULLDOG®

## (undated) DEVICE — SEAL

## (undated) DEVICE — TOWEL,OR,DSP,ST,BLUE,DLX,10/PK,8PK/CS: Brand: MEDLINE

## (undated) DEVICE — BAG RETRIEVAL SPECIMEN SUPERBAG 15 2XL NYLON ITRODUCER

## (undated) DEVICE — TISSUE RETRIEVAL SYSTEM: Brand: INZII RETRIEVAL SYSTEM

## (undated) DEVICE — GOWN SIRUS NONREIN XL W/TWL: Brand: MEDLINE INDUSTRIES, INC.

## (undated) DEVICE — SUREFORM 45 RELOAD GREEN: Brand: SUREFORM

## (undated) DEVICE — SYRINGE, LUER LOCK, 10ML: Brand: MEDLINE

## (undated) DEVICE — HYPODERMIC SAFETY NEEDLE: Brand: MAGELLAN

## (undated) DEVICE — STAPLER SHEATH: Brand: ENDOWRIST

## (undated) DEVICE — AIRSEAL 12 MM ACCESS PORT AND PALM GRIP OBTURATOR WITH BLADELESS OPTICAL TIP, 150 MM LENGTH: Brand: AIRSEAL

## (undated) DEVICE — BRUSH CLN L220CM DIA5MM ZAH DISP FOR WRK CHAN DIA2.8/4.2MM

## (undated) DEVICE — GAUZE,SPONGE,4"X4",16PLY,XRAY,STRL,LF: Brand: MEDLINE

## (undated) DEVICE — DRAIN SURG SGL COLL PT TB FOR ATS BG OASIS

## (undated) DEVICE — CATHETER THOR 32FR L23IN PVC 6 EYELET STR ATRAUM

## (undated) DEVICE — SYRINGE,LUER LOCK,STERILE,60ML,PUMPC: Brand: MEDLINE

## (undated) DEVICE — SINGLE USE AIR/WATER, SUCTION AND BIOPSY VALVES SET: Brand: ORCAPOD™

## (undated) DEVICE — CONTAINER,SPECIMEN,OR STERILE,4OZ: Brand: MEDLINE

## (undated) DEVICE — SUTURE VICRYL + SZ 3-0 L27IN ABSRB UD L26MM SH 1/2 CIR VCP416H

## (undated) DEVICE — SHEET,DRAPE,40X58,STERILE: Brand: MEDLINE

## (undated) DEVICE — SUREFORM 45: Brand: SUREFORM

## (undated) DEVICE — PAD N ADH W3XL4IN POLY COT SFT PERF FLM EASILY CUT ABSRB

## (undated) DEVICE — PENCIL ES L3M BTTN SWCH S STL HEX LOK BLDE ELECTRD HOLSTER

## (undated) DEVICE — SPONGE DRN W4XL4IN RAYON/POLYESTER 6 PLY NONWOVEN PRECUT 2 PER PK

## (undated) DEVICE — SUREFORM 45 RELOAD BLUE: Brand: SUREFORM

## (undated) DEVICE — TRAP SPEC POLYP REM STRNR CLN DSGN MAGNIFYING WIND DISP

## (undated) DEVICE — SET LBLING PEN POLYPR LBL PAL

## (undated) DEVICE — SUTURE MONOCRYL + SZ 4-0 L27IN ABSRB UD L19MM PS-2 3/8 CIR MCP426H

## (undated) DEVICE — LIQUIBAND RAPID ADHESIVE 36/CS 0.8ML: Brand: MEDLINE

## (undated) DEVICE — DRAIN SURG 24FR BLAK SIL HUBLESS 4 CHANNELED RADPQ EXTN TB

## (undated) DEVICE — SPONGE LAP W18XL18IN WHT COT 4 PLY FLD STRUNG RADPQ DISP ST 2 PER PACK

## (undated) DEVICE — SNARE COLD DIAMOND 10MM THIN

## (undated) DEVICE — STERILE POLYISOPRENE POWDER-FREE SURGICAL GLOVES WITH EMOLLIENT COATING: Brand: PROTEXIS

## (undated) DEVICE — SUTURE ABSORBABLE L 18 IN SZ 4-0 NDL L 19 MM POLYGLACTIN 910 36/BX

## (undated) DEVICE — VESSEL SEALER EXTEND: Brand: ENDOWRIST

## (undated) DEVICE — SUCTION IRRIGATOR: Brand: ENDOWRIST

## (undated) DEVICE — SUTURE PERMAHAND SZ 0 L30IN NONABSORBABLE BLK L26MM SH 1/2 K834H

## (undated) DEVICE — CONMED SCOPE SAVER BITE BLOCK, 20X27 MM: Brand: SCOPE SAVER

## (undated) DEVICE — LIGHT HANDLE: Brand: DEVON

## (undated) DEVICE — REDUCER: Brand: ENDOWRIST

## (undated) DEVICE — GENERAL ROBOT: Brand: MEDLINE INDUSTRIES, INC.

## (undated) DEVICE — 1LYRTR 16FR10ML100%SILTMPS SNP: Brand: MEDLINE INDUSTRIES, INC.

## (undated) DEVICE — SOLUTION IRRIG 1000ML STRL H2O USP PLAS POUR BTL

## (undated) DEVICE — SUREFORM 45 CURVED-TIP: Brand: SUREFORM

## (undated) DEVICE — BW-412T DISP COMBO CLEANING BRUSH: Brand: SINGLE USE COMBINATION CLEANING BRUSH

## (undated) DEVICE — INTENDED FOR TISSUE SEPARATION, AND OTHER PROCEDURES THAT REQUIRE A SHARP SURGICAL BLADE TO PUNCTURE OR CUT.: Brand: BARD-PARKER ® STAINLESS STEEL BLADES

## (undated) DEVICE — MAJOR SET UP PK

## (undated) DEVICE — GAUZE,SPONGE,4"X4",8PLY,STRL,LF,10/TRAY: Brand: MEDLINE

## (undated) DEVICE — TUBING, SUCTION, 1/4" X 10', STRAIGHT: Brand: MEDLINE